# Patient Record
Sex: MALE | Race: WHITE | NOT HISPANIC OR LATINO | Employment: FULL TIME | URBAN - METROPOLITAN AREA
[De-identification: names, ages, dates, MRNs, and addresses within clinical notes are randomized per-mention and may not be internally consistent; named-entity substitution may affect disease eponyms.]

---

## 2017-12-14 ENCOUNTER — TRANSCRIBE ORDERS (OUTPATIENT)
Dept: OCCUPATIONAL THERAPY | Facility: CLINIC | Age: 32
End: 2017-12-14

## 2017-12-14 DIAGNOSIS — S16.1XXA STRAIN OF NECK MUSCLE, INITIAL ENCOUNTER: Primary | ICD-10-CM

## 2017-12-14 DIAGNOSIS — S29.019A STRAIN OF THORACIC REGION, INITIAL ENCOUNTER: ICD-10-CM

## 2017-12-18 ENCOUNTER — TREATMENT (OUTPATIENT)
Dept: PHYSICAL THERAPY | Facility: CLINIC | Age: 32
End: 2017-12-18

## 2017-12-18 PROCEDURE — 97012 MECHANICAL TRACTION THERAPY: CPT | Performed by: PHYSICAL THERAPIST

## 2017-12-18 PROCEDURE — 97140 MANUAL THERAPY 1/> REGIONS: CPT | Performed by: PHYSICAL THERAPIST

## 2017-12-18 PROCEDURE — 97001 PR PHYS THERAPY EVALUATION: CPT | Performed by: PHYSICAL THERAPIST

## 2017-12-18 NOTE — PROGRESS NOTES
Physical Therapy Initial Evaluation and Plan of Care    Patient: Abdi Tyler   : 1985  Diagnosis/ICD-10 Code:  No primary diagnosis found.  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 -  x 5 years Pain  Current pain rating: 3  At best pain rating: 3  At worst pain ratin  Location: Left cervical, scapular region, dorsal forearm, dorsal hand 4th, 5th digits  Quality: burning, dull ache, radiating and sharp  Relieving factors: heat and change in position (arm overhead)  Aggravating factors: prolonged positioning and sleeping (cervical flexion and extension)  Progression: no change    Social Support  Lives with: spouse    Hand dominance: right    Diagnostic Tests  No diagnostic tests performed    Treatments  Previous treatment: medication  Current treatment: medication and physical therapy  Patient Goals  Patient goal: pain to go away           Objective     Special Questions  Patient is experiencing disturbed sleep.       Postural Observations  Seated posture: fair        Palpation   Left   Hypertonic in the upper trapezius.   Tenderness of the cervical paraspinals and upper trapezius.     Neurological Testing   Sensation   Cervical/Thoracic   Left   Diminished: light touch    Comments   Left light touch: C8 distribution.     Active Range of Motion   Cervical/Thoracic Spine   Cervical    Flexion: 49 degrees with pain  Extension: 46 degrees with pain  Left lateral flexion: 40  degrees with pain  Right lateral flexion: 46 degrees   Left rotation: 78 degrees   Right rotation: 79 degrees     Strength/Myotome Testing   Cervical Spine     Right   Normal strength    Left Elbow   Extension: 4-    Left Wrist/Hand   Wrist flexion: 4-    Additional Strength Details  Thumb abduction 4/5 on the left    Tests   Cervical     Left   Positive cervical distraction and Spurling's sign.     Left Shoulder   Positive active compression ([place]) and ULTT1.          Assessment & Plan     Assessment  Impairments: abnormal muscle firing, abnormal muscle tone, abnormal or restricted ROM, activity intolerance, impaired physical strength, lacks appropriate home exercise program and pain with function  Assessment details: 32 y.o. Male with Cervical strain with left UE radiculopathy presents with: 1. constant left cervical pain, 2. Decreased cervical AROM, 3. Altered sensation along the C8 dermatome, 4. Weakness of the C7 & C8 myotomal muscles, 5. 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  Patient will have functional pain free cervical AROM  Patient will be able to lift 20# to chest level without increased symptoms  Patient will return to work with min/no restrictions        Plan  Therapy options: will be seen for skilled physical therapy services  Planned modality interventions: ultrasound and traction  Planned therapy interventions: manual therapy,  soft tissue mobilization, strengthening, stretching, home exercise program and postural training  Frequency: 3x week  Duration in visits: 12  Duration in weeks: 4  Treatment plan discussed with: patient        Manual Therapy:    10     mins  99462;  Therapeutic Exercise:    5     mins  14831;     Neuromuscular Kilo:    0    mins  36083;    Therapeutic Activity:     0     mins  43356;       Evaluation Time:     25  mins  Timed Treatment:   15   mins   Total Treatment:     65   mins    PT SIGNATURE: Clarissa Jennings, PT   DATE TREATMENT INITIATED: 12/18/2017    Initial Certification  Certification Period: 3/18/2018  I certify that the therapy services are furnished while this patient is under my care.  The services outlined above are required by this patient, and will be reviewed every 90 days.     PHYSICIAN: Abdi Butler MD      DATE:     Please sign and return via fax to [phone].. Thank you, [facility].

## 2017-12-19 ENCOUNTER — TREATMENT (OUTPATIENT)
Dept: PHYSICAL THERAPY | Facility: CLINIC | Age: 32
End: 2017-12-19

## 2017-12-19 DIAGNOSIS — S16.1XXD STRAIN OF NECK MUSCLE, SUBSEQUENT ENCOUNTER: Primary | ICD-10-CM

## 2017-12-19 PROCEDURE — 97530 THERAPEUTIC ACTIVITIES: CPT | Performed by: PHYSICAL THERAPIST

## 2017-12-19 PROCEDURE — 97110 THERAPEUTIC EXERCISES: CPT | Performed by: PHYSICAL THERAPIST

## 2017-12-19 PROCEDURE — 97012 MECHANICAL TRACTION THERAPY: CPT | Performed by: PHYSICAL THERAPIST

## 2017-12-19 NOTE — PROGRESS NOTES
"MD Letter    Date of Initial Visit: Type: THERAPY  Noted: 12/18/2017  Today's Date: 12/19/2017  Patient seen for 2 sessions    Treatment has included: therapeutic exercise, manual therapy, ultrasound, traction and moist heat    Subjective   States that he was feeling much better after traction yesterday.  States that his pain was greatly decreased yesterday and had no pain in the arm.  States that when he woke up this morning his neck popped and the pain returned.  Today states that the pain has returned to the left scapular area and the left elbow.  He states that the pain in the hand has essentially resolved. He states that he did not take any medication today to see how he \"truly was\".    Objective   Cervical AROM - full except for extension and left rotation which are both slightly limited by pain.  He does have pain at end range flexion as well but the motion is full.  Strength - significant weakness noted in C7 & C8 myotomes  Sensation - altered sensation along the C7 & C8 dermatomes  Palpation - Increased tone in the left cervical paravertebral muscles  Special tests - positive Cervical compression and distraction tests, positive Spurlings test  Activity tolerances - he is able to lift 10# to waist without pain but 15# caused increased symptoms.    Assessment/Plan  Patient has demonstrated moderate improvement since the initiation of therapy.  The pain has  Decreased in intensity but is still constant.  The cervical motion has increased.  The activity tolerances have increased but only minimally.  I feel that the patient would benefit from continued therapy as the traction was beneficial, but may need further diagnostic testing to rule out abnormal neural involvement.  If you have any questions concerning the care, please do not hesitate to contact me.          PT Signature: Clarissa Jennings, PT          Therapeutic Exercise:    10     mins  76660;     Neuromuscular Kilo:    0    mins  07366;    Therapeutic " Activity:     12     mins  83369;   Assessed for MD    Timed Treatment:   22   mins   Total Treatment:     50   mins

## 2017-12-21 ENCOUNTER — TREATMENT (OUTPATIENT)
Dept: PHYSICAL THERAPY | Facility: CLINIC | Age: 32
End: 2017-12-21

## 2017-12-21 DIAGNOSIS — S16.1XXD STRAIN OF NECK MUSCLE, SUBSEQUENT ENCOUNTER: Primary | ICD-10-CM

## 2017-12-21 PROCEDURE — 97035 APP MDLTY 1+ULTRASOUND EA 15: CPT | Performed by: PHYSICAL THERAPIST

## 2017-12-21 PROCEDURE — 97140 MANUAL THERAPY 1/> REGIONS: CPT | Performed by: PHYSICAL THERAPIST

## 2017-12-21 PROCEDURE — 97012 MECHANICAL TRACTION THERAPY: CPT | Performed by: PHYSICAL THERAPIST

## 2017-12-21 NOTE — PROGRESS NOTES
Physical Therapy Daily Progress Note    VISIT#: 3    Subjective   Abdi Tyler reports: that his pain was varying in intensity last night.  Does feel a little better today in general.  Still has intermittent pain in the left elbow and constant pain in the left scapular region.        Objective   Full cervical motion but has increased pain with end range cervical flexion and extension     Increased tone in cervical pvm and left UT and scalenes    See Exercise, Manual, and Modality Logs for complete treatment.     Patient Education: importance of posture    Assessment/Plan  Persistent pain and parasthesias in left UE and scapular region.  Relief with traction.     Progress per Plan of Care  Await scheduling of MRI         Manual Therapy:    10     mins  63052;  Therapeutic Exercise:    0     mins  40869;     Neuromuscular Kilo:    0    mins  56661;    Therapeutic Activity:     0     mins  34082;       Timed Treatment:   18   mins   Total Treatment:     65   mins    Clarissa Jennings, PT  [place] License # [id number]  Physical Therapist

## 2017-12-27 ENCOUNTER — TREATMENT (OUTPATIENT)
Dept: PHYSICAL THERAPY | Facility: CLINIC | Age: 32
End: 2017-12-27

## 2017-12-27 DIAGNOSIS — S16.1XXD STRAIN OF NECK MUSCLE, SUBSEQUENT ENCOUNTER: Primary | ICD-10-CM

## 2017-12-27 PROCEDURE — 97035 APP MDLTY 1+ULTRASOUND EA 15: CPT | Performed by: PHYSICAL THERAPIST

## 2017-12-27 PROCEDURE — 97012 MECHANICAL TRACTION THERAPY: CPT | Performed by: PHYSICAL THERAPIST

## 2017-12-27 NOTE — PROGRESS NOTES
Physical Therapy Daily Progress Note    VISIT#: 4    Subjective   Abdi Tyler reports: that his cervical and Left UE pain has decreased but has not resolved.  States that the frequency of the UE parasthesias has decreased considerably.  States that he still has radiating pain into the triceps region.      Objective   Relief with cervical distraction     See Exercise, Manual, and Modality Logs for complete treatment.     Patient Education: Posture    Assessment/Plan  Patient with resolving rafdicular symptoms yet still present.  Increased symptoms with cervical extension.    Progress per Plan of Care  Await MRI         Manual Therapy:    5     mins  15769;  Therapeutic Exercise:    0     mins  79671;     Neuromuscular Kilo:    0    mins  69500;    Therapeutic Activity:     0     mins  91707;       Timed Treatment:   13   mins   Total Treatment:     50   mins    Clarissa Jennings, PT  [place] License # [id number]  Physical Therapist

## 2017-12-28 ENCOUNTER — TREATMENT (OUTPATIENT)
Dept: PHYSICAL THERAPY | Facility: CLINIC | Age: 32
End: 2017-12-28

## 2017-12-28 DIAGNOSIS — S16.1XXD STRAIN OF NECK MUSCLE, SUBSEQUENT ENCOUNTER: Primary | ICD-10-CM

## 2017-12-28 PROCEDURE — 97035 APP MDLTY 1+ULTRASOUND EA 15: CPT | Performed by: PHYSICAL THERAPIST

## 2017-12-28 PROCEDURE — 97012 MECHANICAL TRACTION THERAPY: CPT | Performed by: PHYSICAL THERAPIST

## 2017-12-28 PROCEDURE — 97140 MANUAL THERAPY 1/> REGIONS: CPT | Performed by: PHYSICAL THERAPIST

## 2017-12-28 NOTE — PROGRESS NOTES
Physical Therapy Daily Progress Note    VISIT#: 5    Subjective   Abdi Tyler reports: that his neck continues to improve.  States that he still has some quivering in hie left triceps.  States that the arm pain has decreased considerably but does still have the scapular pain.  States that he is taking his meds as prescribed       Objective   Weakness in left triceps and slight weakness in left wrist flexors    Full cervical AROM - pain only with end range flexion    Positive neural tension signs     See Exercise, Manual, and Modality Logs for complete treatment.     Patient Education: posture - avoid cervical extension positioning.     Assessment/Plan  Cervical radicular symptoms are resolving but still has considerable weakness in the left triceps.  Still awaiting authorization for MRI.    Progress per Plan of Care           Manual Therapy:    8     mins  80393;  Therapeutic Exercise:    5     mins  79315;     Neuromuscular Kilo:    0    mins  29586;    Therapeutic Activity:     0     mins  45084;       Timed Treatment:   21   mins   Total Treatment:     60   mins    Clarissa Jennings, PT  [place] License # [id number]  Physical Therapist

## 2017-12-29 ENCOUNTER — TRANSCRIBE ORDERS (OUTPATIENT)
Dept: ADMINISTRATIVE | Facility: HOSPITAL | Age: 32
End: 2017-12-29

## 2017-12-29 DIAGNOSIS — S16.1XXA STRAIN OF CERVICAL PORTION OF LEFT TRAPEZIUS MUSCLE: Primary | ICD-10-CM

## 2018-01-03 ENCOUNTER — TREATMENT (OUTPATIENT)
Dept: PHYSICAL THERAPY | Facility: CLINIC | Age: 33
End: 2018-01-03

## 2018-01-03 DIAGNOSIS — S16.1XXD STRAIN OF NECK MUSCLE, SUBSEQUENT ENCOUNTER: Primary | ICD-10-CM

## 2018-01-03 PROCEDURE — 97035 APP MDLTY 1+ULTRASOUND EA 15: CPT | Performed by: PHYSICAL THERAPIST

## 2018-01-03 PROCEDURE — 97530 THERAPEUTIC ACTIVITIES: CPT | Performed by: PHYSICAL THERAPIST

## 2018-01-03 PROCEDURE — 97012 MECHANICAL TRACTION THERAPY: CPT | Performed by: PHYSICAL THERAPIST

## 2018-01-03 NOTE — PROGRESS NOTES
Physical Therapy Daily Progress Note    VISIT#: 6    Subjective   Abdi Tyler reports: that he has constant low grade pain in the lateral left elbow.  Has intermittent burning in the left scapular region. Denies any cervical pain or pain into the hand.  States that his arm still feels a little weak.      Objective   Weakness in left triceps and wrist flexors     Sensation intact    Cervical flexion causes increased left elbow pain, cervical extension causes increased cervical pain     See Exercise, Manual, and Modality Logs for complete treatment.     Patient Education: avoid heavy lifting even if he feels like he can do it.  Rest positions, symptom relief positions    Assessment/Plan  Continues to receive relief from traction.    Progress per Plan of Care  To have MRI on Friday         Manual Therapy:    0     mins  30996;  Therapeutic Exercise:    0     mins  09112;     Neuromuscular Kilo:    0    mins  39667;    Therapeutic Activity:     10     mins  75479;       Timed Treatment:   18   mins   Total Treatment:     50   mins    Clarissa Jennings, PT  [place] License # [id number]  Physical Therapist

## 2018-01-04 ENCOUNTER — TREATMENT (OUTPATIENT)
Dept: PHYSICAL THERAPY | Facility: CLINIC | Age: 33
End: 2018-01-04

## 2018-01-04 DIAGNOSIS — S16.1XXD STRAIN OF NECK MUSCLE, SUBSEQUENT ENCOUNTER: Primary | ICD-10-CM

## 2018-01-04 PROCEDURE — 97035 APP MDLTY 1+ULTRASOUND EA 15: CPT | Performed by: PHYSICAL THERAPIST

## 2018-01-04 PROCEDURE — 97012 MECHANICAL TRACTION THERAPY: CPT | Performed by: PHYSICAL THERAPIST

## 2018-01-05 ENCOUNTER — HOSPITAL ENCOUNTER (OUTPATIENT)
Dept: MRI IMAGING | Facility: HOSPITAL | Age: 33
Discharge: HOME | End: 2018-01-05
Admitting: PHYSICAL MEDICINE & REHABILITATION

## 2018-01-05 DIAGNOSIS — S16.1XXA STRAIN OF CERVICAL PORTION OF LEFT TRAPEZIUS MUSCLE: ICD-10-CM

## 2018-01-05 PROCEDURE — 72141 MRI NECK SPINE W/O DYE: CPT

## 2018-01-05 NOTE — PROGRESS NOTES
Physical Therapy Daily Progress Note    VISIT#: 7    Subjective   Abdi Tyler reports: that the radicular symptoms have decreased considerably with the traction.  States that he is having more lower cervical soreness now.  States MRI is tomorrow.      Objective   Full cervical AROM with pain noted only at end range flexion and extension     Persistent weakness in left triceps and wrist flexors    See Exercise, Manual, and Modality Logs for complete treatment.     Patient Education: Discussed concept of centralization of symptoms    Assessment/Plan  Patient is independent in performing his HEP.  Radicular symptoms are resolving with the traction but he still shows sings of neural irritation as his left triceps and wrist flexors are still considerably weaker than the right.  Continued with conservative treatment secondary to persistent neural involvement.    To have MRI tomorrow.  Await results           Manual Therapy:    0     mins  84361;  Therapeutic Exercise:    0     mins  84954;     Neuromuscular Kilo:    0    mins  27521;    Therapeutic Activity:     5     mins  43265;   Patient education    Timed Treatment:   13   mins   Total Treatment:     50   mins    Clarissa Jennings, PT  [place] License # [id number]  Physical Therapist

## 2018-02-05 ENCOUNTER — TRANSCRIBE ORDERS (OUTPATIENT)
Dept: PHYSICAL THERAPY | Facility: CLINIC | Age: 33
End: 2018-02-05

## 2018-02-05 DIAGNOSIS — M54.2 CERVICAL PAIN: Primary | ICD-10-CM

## 2018-02-07 ENCOUNTER — TREATMENT (OUTPATIENT)
Dept: PHYSICAL THERAPY | Facility: CLINIC | Age: 33
End: 2018-02-07

## 2018-02-07 DIAGNOSIS — S16.1XXD STRAIN OF NECK MUSCLE, SUBSEQUENT ENCOUNTER: Primary | ICD-10-CM

## 2018-02-07 PROCEDURE — 97110 THERAPEUTIC EXERCISES: CPT | Performed by: PHYSICAL THERAPIST

## 2018-02-07 PROCEDURE — 97012 MECHANICAL TRACTION THERAPY: CPT | Performed by: PHYSICAL THERAPIST

## 2018-02-07 PROCEDURE — 97002 PR PHYS THERAPY RE-EVALUATION: CPT | Performed by: PHYSICAL THERAPIST

## 2018-02-07 NOTE — PROGRESS NOTES
Physical Therapy Initial Evaluation and Plan of Care    Patient: Abdi Tyler   : 1985  Diagnosis/ICD-10 Code:  Strain of neck muscle, subsequent encounter [S16.1XXD]  Referring practitioner: Mason Nelson MD    Subjective Evaluation    History of Present Illness  Date of onset: 2017  Mechanism of injury: While working reaching out to the left with the left arm and cervical region rotated to the right, felt pop in the left cervical area, pain developed next day,   BW - meds, restrictions and PT  Previous traction, manual therapy, exercises  MRI - C56 & C67 disc involvement  DR Nelson - does not want to do surgery as patinet is improving    Ibu and Flexeril - prn as patient taking when needed      Patient Occupation:  - manufacturing parts   Precautions and Work Restrictions: No lifting >15#, no overhead workPain  Current pain ratin  At best pain ratin  At worst pain rating: 3  Location: Left cervical, upper thoracic and axillary region , occasional posterior elbow  Quality: knife-like and discomfort  Relieving factors: rest, change in position and medications  Exacerbated by: flexion, quick rotation of the neck.  Progression: improved    Diagnostic Tests  MRI studies: abnormal (C56 & C67 disc involvement)    Treatments  Previous treatment: medication and physical therapy  Current treatment: physical therapy  Patient Goals  Patient goal: GEt rid of the pain with motion, back to normal activity iwhtout pain           Objective       Postural Observations  Seated posture: fair  Standing posture: good    Additional Postural Observation Details  Slight protraction of the shoulders    Active Range of Motion   Cervical/Thoracic Spine   Cervical    Flexion: 49 (pain at 37) degrees with pain  Extension: 46 degrees   Left lateral flexion: 49 degrees   Right lateral flexion: 44 degrees   Left rotation: 78 degrees   Right rotation: 81 degrees     Strength/Myotome Testing   Cervical Spine      Left   Levator scapulae (C4): 5    Left Shoulder     Isolated Muscles   Levator scapulae: 5   Lower trapezius: 4+   Middle trapezius: 4+     Left Elbow   Flexion: 4+  Extension: 4    Left Wrist/Hand   Wrist extension: 5  Wrist flexion: 4    Tests   Cervical     Left   Positive cervical distraction and Spurling's sign.     Left Shoulder   Positive active compression ([place]).          Assessment & Plan     Assessment  Impairments: abnormal muscle tone, abnormal or restricted ROM, activity intolerance, impaired physical strength, lacks appropriate home exercise program, pain with function and safety issue  Assessment details: 33 y.o. Female with cervical strain with resolving radicular symptoms presents with: 1. Constant low grade cervical and left upper thoracic pain,  2. Slightly decreased cervical AROM, 3. Increased muscle tone in left cervical and upper thoracic mm.,  4. Weakness in left triceps and wrist flexors, 5. 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  Patient will have functional pain free cervical AROM  Patient will be able to Lift 40# without increased symptoms  Patient will return to work with min/no restrictions        Plan  Therapy options: will be seen for skilled physical therapy services  Planned modality interventions: traction  Planned therapy interventions: manual therapy, strengthening, stretching and spinal/joint mobilization  Frequency: 3x week  Duration in  visits: 12  Duration in weeks: 4  Treatment plan discussed with: patient        Manual Therapy:    0     mins  06165;  Therapeutic Exercise:    35     mins  52677;     Neuromuscular Kilo:    0    mins  11891;    Therapeutic Activity:     0     mins  23948;       Evaluation Time:     30  mins  Timed Treatment:   35   mins   Total Treatment:     95   mins    PT SIGNATURE: Clarissa Jennings, PT   DATE TREATMENT INITIATED: 2/7/2018    Initial Certification  Certification Period: 5/8/2018  I certify that the therapy services are furnished while this patient is under my care.  The services outlined above are required by this patient, and will be reviewed every 90 days.     PHYSICIAN: Mason Nelson MD  __________________________      DATE: ______________________    Please sign and return via fax to [phone].. Thank you, [facility].

## 2018-02-08 ENCOUNTER — TREATMENT (OUTPATIENT)
Dept: PHYSICAL THERAPY | Facility: CLINIC | Age: 33
End: 2018-02-08

## 2018-02-08 DIAGNOSIS — M54.12 RADICULOPATHY, CERVICAL: ICD-10-CM

## 2018-02-08 DIAGNOSIS — S16.1XXD STRAIN OF NECK MUSCLE, SUBSEQUENT ENCOUNTER: Primary | ICD-10-CM

## 2018-02-08 PROCEDURE — 97110 THERAPEUTIC EXERCISES: CPT | Performed by: PHYSICAL THERAPIST

## 2018-02-08 NOTE — PROGRESS NOTES
Physical Therapy Daily Progress Note    VISIT#: 2    Subjective   Abdi Tyler reports: that he had some muscles soreness from yesterday but not ture pain.   Soreness in triceps.      Objective   Tenderness in triceps - muscle soreness    Pain into the left scapular region with end range cervical flexion    See Exercise, Manual, and Modality Logs for complete treatment.     Patient Education: proper posture    Assessment/Plan  Patient with muscle soreness from exercise but no pain.  Did not have time for traction today due to work conflicts.  No return of radicular symptoms.      Progress strengthening /stabilization /functional activity  Resume traction next visit         Manual Therapy:    0     mins  75315;  Therapeutic Exercise:    40     mins  03082;     Neuromuscular Kilo:    0    mins  34975;    Therapeutic Activity:     0     mins  54667;       Timed Treatment:   40   mins   Total Treatment:     50   mins    Clarissa Jennings, PT  [place] License # [id number]  Physical Therapist

## 2018-02-13 ENCOUNTER — TREATMENT (OUTPATIENT)
Dept: PHYSICAL THERAPY | Facility: CLINIC | Age: 33
End: 2018-02-13

## 2018-02-13 DIAGNOSIS — M54.12 RADICULOPATHY, CERVICAL: ICD-10-CM

## 2018-02-13 DIAGNOSIS — S16.1XXD STRAIN OF NECK MUSCLE, SUBSEQUENT ENCOUNTER: Primary | ICD-10-CM

## 2018-02-13 PROCEDURE — 97140 MANUAL THERAPY 1/> REGIONS: CPT | Performed by: PHYSICAL THERAPIST

## 2018-02-13 PROCEDURE — 97110 THERAPEUTIC EXERCISES: CPT | Performed by: PHYSICAL THERAPIST

## 2018-02-13 NOTE — PROGRESS NOTES
Physical Therapy Daily Progress Note    VISIT#: 3    Subjective   Abdi Tyler reports: that he still has a little weak in the left arm, but it is is improving.  States that he had occasional radiating pain into the left scapular region as well as posterior upper arm.      Objective   Strength - left triceps 4/5, left wrist flexors 4+/5, otherwise WNL in left UE    Increased muscle tone in the left upper traps and cervical paravertebral muscles      Full cervical flexion but has pain at ~75% flexion, full extension    See Exercise, Manual, and Modality Logs for complete treatment.     Patient Education: avoid painful positions     Assessment/Plan  Patient with increased strength in UE and decreased frequency of pain.  Still with some signs of abnormal neural involvement.     Progress strengthening /stabilization /functional activity           Manual Therapy:    8     mins  87632;  Therapeutic Exercise:    40     mins  61859;     Neuromuscular Kilo:    0    mins  76065;    Therapeutic Activity:     0     mins  17805;       Timed Treatment:   48   mins   Total Treatment:     75   mins    Clarissa Jennings, PT  [place] License # [id number]  Physical Therapist

## 2018-02-15 ENCOUNTER — TREATMENT (OUTPATIENT)
Dept: PHYSICAL THERAPY | Facility: CLINIC | Age: 33
End: 2018-02-15

## 2018-02-15 DIAGNOSIS — M54.12 RADICULOPATHY, CERVICAL: ICD-10-CM

## 2018-02-15 DIAGNOSIS — S16.1XXD STRAIN OF NECK MUSCLE, SUBSEQUENT ENCOUNTER: Primary | ICD-10-CM

## 2018-02-15 PROCEDURE — 97110 THERAPEUTIC EXERCISES: CPT | Performed by: PHYSICAL THERAPIST

## 2018-02-15 PROCEDURE — 97140 MANUAL THERAPY 1/> REGIONS: CPT | Performed by: PHYSICAL THERAPIST

## 2018-02-15 PROCEDURE — 97012 MECHANICAL TRACTION THERAPY: CPT | Performed by: PHYSICAL THERAPIST

## 2018-02-15 NOTE — PROGRESS NOTES
Physical Therapy Daily Progress Note    VISIT#: 4    Subjective   Abdi Tyler reports: that he is having a slight flareup of pain today.  Noted increased pain upon awakening.  Unsure of cause of new symptoms.  States he actually is having some new cervical pain.       Objective   Cervical flexion full but has left cervical and scapular pain with end range flexion    Relief with cervical distraction    See Exercise, Manual, and Modality Logs for complete treatment.     Patient Education:Posture    Assessment/Plan  Relief of cervical symptoms with manual and mechanical cervical traction today.  Patient very compliant with treatment program.    Progress per Plan of Care           Manual Therapy:    11     mins  09759;  Therapeutic Exercise:    30     mins  66692;     Neuromuscular Kilo:    0    mins  85968;    Therapeutic Activity:     0     mins  35890;       Timed Treatment:   41   mins   Total Treatment:     75   mins    Clarissa Jennings, PT  [place] License # [id number]  Physical Therapist

## 2018-02-22 ENCOUNTER — TREATMENT (OUTPATIENT)
Dept: PHYSICAL THERAPY | Facility: CLINIC | Age: 33
End: 2018-02-22

## 2018-02-22 DIAGNOSIS — S16.1XXD STRAIN OF NECK MUSCLE, SUBSEQUENT ENCOUNTER: Primary | ICD-10-CM

## 2018-02-22 DIAGNOSIS — M54.12 RADICULOPATHY, CERVICAL: ICD-10-CM

## 2018-02-22 PROCEDURE — 97110 THERAPEUTIC EXERCISES: CPT | Performed by: PHYSICAL THERAPIST

## 2018-02-22 PROCEDURE — 97012 MECHANICAL TRACTION THERAPY: CPT | Performed by: PHYSICAL THERAPIST

## 2018-02-22 NOTE — PROGRESS NOTES
Physical Therapy Daily Progress Note    VISIT#: 5    Subjective   Abdi Tyler reports: that he seems to be getting moire cervical pain now but has much less muscular pain. States that the has not had any arm pain recently.  States that he no longer has any pain interrupted sleep.  Has only taken IBU twice this week.        Objective   Cervical flexion  62* with pain at 49*    Slight restriction in left rotation     Full UE strength     See Exercise, Manual, and Modality Logs for complete treatment.     Patient Education:concept of centralization of pain    Assessment/Plan  Much improved as he no longer has any radicular symptoms and his strength has returned to normal.     Progress per Plan of Care           Manual Therapy:    0     mins  86072;  Therapeutic Exercise:    45/45     mins  45919;     Neuromuscular Kilo:    0    mins  21382;    Therapeutic Activity:     0     mins  72670;       Timed Treatment:   45   mins   Total Treatment:     70   mins    Clarissa Jennings, PT  [place] License # [id number]  Physical Therapist

## 2018-02-26 ENCOUNTER — TREATMENT (OUTPATIENT)
Dept: PHYSICAL THERAPY | Facility: CLINIC | Age: 33
End: 2018-02-26

## 2018-02-26 DIAGNOSIS — M54.12 RADICULOPATHY, CERVICAL: Primary | ICD-10-CM

## 2018-02-26 PROCEDURE — 97530 THERAPEUTIC ACTIVITIES: CPT | Performed by: PHYSICAL THERAPIST

## 2018-02-26 PROCEDURE — 97110 THERAPEUTIC EXERCISES: CPT | Performed by: PHYSICAL THERAPIST

## 2018-02-26 NOTE — PROGRESS NOTES
MD Letter    Date of Initial Visit: Type: THERAPY  Noted: 2/7/2018  Today's Date: 2/26/2018  Patient seen for 6 sessions    Treatment has included: therapeutic exercise, manual therapy and traction    Subjective   He states that he is feeling much better than he did upon his last visit  With you.  He states that he only has left  Scapular pain 2-3 times/week.  He states that he is not having any cervical or true left UE pain or parasthesias.  He states that he has been much more active the past two weeks without increased symptoms.    Objective   Cervical AROM -  Full and pain free in all planes of motion except for flexion, which is slightly painful at end range flexion  Strength - 5/5 in left UE   strength - right 108#, left 98#  Sensation - intact  Palpation - slight increased muscle tone in the left upper trap  Special tests - positive cervical distraction, slightly posiitve Spurlings test  Activity tolerances - he is able to lift 50# with both hands to shoulder level and 50# to waist level with the left hand only.    Assessment/Plan  Patient has demonstrated signficant improvement since the initiation of therapy.  The pain has  Nearly resolved.  The motion has returned to normal.  The activity tolerances have increased to work demand level.  I feel that the patient would benefit from continuing his HEP but discontinuing his formal therapy.  If you have any questions concerning the care, please do not hesitate to contact me.          PT Signature: Clarissa Jennings, PT        Manual Therapy:    0     mins  75437;  Therapeutic Exercise:    35     mins  68220;     Neuromuscular Kilo:    0    mins  55990;    Therapeutic Activity:     10     mins  35539;   Assessed for MD    Timed Treatment:   45   mins   Total Treatment:     60   mins

## 2021-04-16 ENCOUNTER — BULK ORDERING (OUTPATIENT)
Dept: CASE MANAGEMENT | Facility: OTHER | Age: 36
End: 2021-04-16

## 2021-04-16 DIAGNOSIS — Z23 IMMUNIZATION DUE: ICD-10-CM

## 2021-08-18 ENCOUNTER — APPOINTMENT (OUTPATIENT)
Dept: MRI IMAGING | Facility: HOSPITAL | Age: 36
End: 2021-08-18

## 2021-08-18 ENCOUNTER — HOSPITAL ENCOUNTER (INPATIENT)
Facility: HOSPITAL | Age: 36
LOS: 5 days | Discharge: HOME | End: 2021-08-23
Attending: EMERGENCY MEDICINE | Admitting: NEUROLOGICAL SURGERY

## 2021-08-18 ENCOUNTER — APPOINTMENT (OUTPATIENT)
Dept: CT IMAGING | Facility: HOSPITAL | Age: 36
End: 2021-08-18

## 2021-08-18 ENCOUNTER — APPOINTMENT (OUTPATIENT)
Dept: GENERAL RADIOLOGY | Facility: HOSPITAL | Age: 36
End: 2021-08-18

## 2021-08-18 DIAGNOSIS — K20.90 ESOPHAGITIS: ICD-10-CM

## 2021-08-18 DIAGNOSIS — M50.222 HERNIATION OF INTERVERTEBRAL DISC AT C5-C6 LEVEL: Primary | ICD-10-CM

## 2021-08-18 DIAGNOSIS — R20.0 NUMBNESS AND TINGLING IN LEFT ARM: ICD-10-CM

## 2021-08-18 DIAGNOSIS — R20.2 NUMBNESS AND TINGLING IN LEFT ARM: ICD-10-CM

## 2021-08-18 LAB
ANION GAP SERPL CALCULATED.3IONS-SCNC: 11.1 MMOL/L (ref 5–15)
BUN SERPL-MCNC: 12 MG/DL (ref 6–20)
BUN/CREAT SERPL: 14 (ref 7–25)
CALCIUM SPEC-SCNC: 9.2 MG/DL (ref 8.6–10.5)
CHLORIDE SERPL-SCNC: 105 MMOL/L (ref 98–107)
CO2 SERPL-SCNC: 23.9 MMOL/L (ref 22–29)
CREAT SERPL-MCNC: 0.86 MG/DL (ref 0.76–1.27)
DEPRECATED RDW RBC AUTO: 41.9 FL (ref 37–54)
ERYTHROCYTE [DISTWIDTH] IN BLOOD BY AUTOMATED COUNT: 12.2 % (ref 12.3–15.4)
GFR SERPL CREATININE-BSD FRML MDRD: 100 ML/MIN/1.73
GLUCOSE SERPL-MCNC: 169 MG/DL (ref 65–99)
HCT VFR BLD AUTO: 47.2 % (ref 37.5–51)
HGB BLD-MCNC: 15.5 G/DL (ref 13–17.7)
INR PPP: 0.9 (ref 0.9–1.1)
LYMPHOCYTES # BLD MANUAL: 0.23 10*3/MM3 (ref 0.7–3.1)
LYMPHOCYTES NFR BLD MANUAL: 2 % (ref 19.6–45.3)
MCH RBC QN AUTO: 30.3 PG (ref 26.6–33)
MCHC RBC AUTO-ENTMCNC: 32.8 G/DL (ref 31.5–35.7)
MCV RBC AUTO: 92.4 FL (ref 79–97)
NEUTROPHILS # BLD AUTO: 11.26 10*3/MM3 (ref 1.7–7)
NEUTROPHILS NFR BLD MANUAL: 98 % (ref 42.7–76)
PLAT MORPH BLD: NORMAL
PLATELET # BLD AUTO: 282 10*3/MM3 (ref 140–450)
PMV BLD AUTO: 9.7 FL (ref 6–12)
POTASSIUM SERPL-SCNC: 4.3 MMOL/L (ref 3.5–5.2)
PROTHROMBIN TIME: 12 SECONDS (ref 11.7–14.2)
RBC # BLD AUTO: 5.11 10*6/MM3 (ref 4.14–5.8)
RBC MORPH BLD: NORMAL
SARS-COV-2 RNA RESP QL NAA+PROBE: NOT DETECTED
SODIUM SERPL-SCNC: 140 MMOL/L (ref 136–145)
WBC # BLD AUTO: 11.49 10*3/MM3 (ref 3.4–10.8)
WBC MORPH BLD: NORMAL

## 2021-08-18 PROCEDURE — 85007 BL SMEAR W/DIFF WBC COUNT: CPT | Performed by: EMERGENCY MEDICINE

## 2021-08-18 PROCEDURE — U0003 INFECTIOUS AGENT DETECTION BY NUCLEIC ACID (DNA OR RNA); SEVERE ACUTE RESPIRATORY SYNDROME CORONAVIRUS 2 (SARS-COV-2) (CORONAVIRUS DISEASE [COVID-19]), AMPLIFIED PROBE TECHNIQUE, MAKING USE OF HIGH THROUGHPUT TECHNOLOGIES AS DESCRIBED BY CMS-2020-01-R: HCPCS | Performed by: EMERGENCY MEDICINE

## 2021-08-18 PROCEDURE — 80048 BASIC METABOLIC PNL TOTAL CA: CPT | Performed by: EMERGENCY MEDICINE

## 2021-08-18 PROCEDURE — 85610 PROTHROMBIN TIME: CPT | Performed by: NURSE PRACTITIONER

## 2021-08-18 PROCEDURE — 72141 MRI NECK SPINE W/O DYE: CPT

## 2021-08-18 PROCEDURE — 25010000002 DEXAMETHASONE SODIUM PHOSPHATE 20 MG/5ML SOLUTION: Performed by: EMERGENCY MEDICINE

## 2021-08-18 PROCEDURE — 25010000002 KETOROLAC TROMETHAMINE PER 15 MG: Performed by: EMERGENCY MEDICINE

## 2021-08-18 PROCEDURE — 72125 CT NECK SPINE W/O DYE: CPT

## 2021-08-18 PROCEDURE — G0378 HOSPITAL OBSERVATION PER HR: HCPCS

## 2021-08-18 PROCEDURE — 99221 1ST HOSP IP/OBS SF/LOW 40: CPT | Performed by: NEUROLOGICAL SURGERY

## 2021-08-18 PROCEDURE — 99284 EMERGENCY DEPT VISIT MOD MDM: CPT

## 2021-08-18 PROCEDURE — 72052 X-RAY EXAM NECK SPINE 6/>VWS: CPT

## 2021-08-18 PROCEDURE — 25010000002 DEXAMETHASONE SODIUM PHOSPHATE 20 MG/5ML SOLUTION: Performed by: NURSE PRACTITIONER

## 2021-08-18 PROCEDURE — 85025 COMPLETE CBC W/AUTO DIFF WBC: CPT | Performed by: EMERGENCY MEDICINE

## 2021-08-18 PROCEDURE — 25010000002 METHYLPREDNISOLONE PER 125 MG: Performed by: EMERGENCY MEDICINE

## 2021-08-18 RX ORDER — KETOROLAC TROMETHAMINE 30 MG/ML
30 INJECTION, SOLUTION INTRAMUSCULAR; INTRAVENOUS ONCE
Status: COMPLETED | OUTPATIENT
Start: 2021-08-18 | End: 2021-08-18

## 2021-08-18 RX ORDER — SODIUM CHLORIDE 0.9 % (FLUSH) 0.9 %
10 SYRINGE (ML) INJECTION AS NEEDED
Status: DISCONTINUED | OUTPATIENT
Start: 2021-08-18 | End: 2021-08-23 | Stop reason: HOSPADM

## 2021-08-18 RX ORDER — HYDROCODONE BITARTRATE AND ACETAMINOPHEN 7.5; 325 MG/1; MG/1
1 TABLET ORAL EVERY 4 HOURS PRN
Status: DISCONTINUED | OUTPATIENT
Start: 2021-08-18 | End: 2021-08-20

## 2021-08-18 RX ORDER — KETOROLAC TROMETHAMINE 30 MG/ML
30 INJECTION, SOLUTION INTRAMUSCULAR; INTRAVENOUS ONCE
Status: DISCONTINUED | OUTPATIENT
Start: 2021-08-18 | End: 2021-08-18

## 2021-08-18 RX ORDER — GABAPENTIN 400 MG/1
400 CAPSULE ORAL 3 TIMES DAILY
Status: DISCONTINUED | OUTPATIENT
Start: 2021-08-18 | End: 2021-08-20

## 2021-08-18 RX ORDER — ONDANSETRON 2 MG/ML
4 INJECTION INTRAMUSCULAR; INTRAVENOUS EVERY 6 HOURS PRN
Status: DISCONTINUED | OUTPATIENT
Start: 2021-08-18 | End: 2021-08-23 | Stop reason: HOSPADM

## 2021-08-18 RX ORDER — METHYLPREDNISOLONE SODIUM SUCCINATE 125 MG/2ML
80 INJECTION, POWDER, LYOPHILIZED, FOR SOLUTION INTRAMUSCULAR; INTRAVENOUS ONCE
Status: COMPLETED | OUTPATIENT
Start: 2021-08-18 | End: 2021-08-18

## 2021-08-18 RX ORDER — SODIUM CHLORIDE 0.9 % (FLUSH) 0.9 %
10 SYRINGE (ML) INJECTION EVERY 12 HOURS SCHEDULED
Status: DISCONTINUED | OUTPATIENT
Start: 2021-08-18 | End: 2021-08-23 | Stop reason: HOSPADM

## 2021-08-18 RX ORDER — DEXAMETHASONE SODIUM PHOSPHATE 4 MG/ML
4 INJECTION, SOLUTION INTRA-ARTICULAR; INTRALESIONAL; INTRAMUSCULAR; INTRAVENOUS; SOFT TISSUE EVERY 6 HOURS
Status: DISCONTINUED | OUTPATIENT
Start: 2021-08-18 | End: 2021-08-20

## 2021-08-18 RX ORDER — CALCIUM CARBONATE 200(500)MG
2 TABLET,CHEWABLE ORAL EVERY 6 HOURS PRN
Status: DISCONTINUED | OUTPATIENT
Start: 2021-08-18 | End: 2021-08-23 | Stop reason: HOSPADM

## 2021-08-18 RX ORDER — NITROGLYCERIN 0.4 MG/1
0.4 TABLET SUBLINGUAL
Status: DISCONTINUED | OUTPATIENT
Start: 2021-08-18 | End: 2021-08-23 | Stop reason: HOSPADM

## 2021-08-18 RX ORDER — ONDANSETRON 4 MG/1
4 TABLET, FILM COATED ORAL EVERY 6 HOURS PRN
Status: DISCONTINUED | OUTPATIENT
Start: 2021-08-18 | End: 2021-08-23 | Stop reason: HOSPADM

## 2021-08-18 RX ORDER — FAMOTIDINE 10 MG/ML
20 INJECTION, SOLUTION INTRAVENOUS EVERY 12 HOURS SCHEDULED
Status: DISCONTINUED | OUTPATIENT
Start: 2021-08-18 | End: 2021-08-22

## 2021-08-18 RX ADMIN — FAMOTIDINE 20 MG: 10 INJECTION INTRAVENOUS at 20:00

## 2021-08-18 RX ADMIN — DEXAMETHASONE SODIUM PHOSPHATE 4 MG: 4 INJECTION, SOLUTION INTRAMUSCULAR; INTRAVENOUS at 15:45

## 2021-08-18 RX ADMIN — HYDROCODONE BITARTRATE AND ACETAMINOPHEN 1 TABLET: 7.5; 325 TABLET ORAL at 20:00

## 2021-08-18 RX ADMIN — KETOROLAC TROMETHAMINE 30 MG: 30 INJECTION, SOLUTION INTRAMUSCULAR at 04:30

## 2021-08-18 RX ADMIN — GABAPENTIN 400 MG: 400 CAPSULE ORAL at 15:26

## 2021-08-18 RX ADMIN — DEXAMETHASONE SODIUM PHOSPHATE 4 MG: 4 INJECTION, SOLUTION INTRAMUSCULAR; INTRAVENOUS at 22:06

## 2021-08-18 RX ADMIN — GABAPENTIN 400 MG: 400 CAPSULE ORAL at 10:54

## 2021-08-18 RX ADMIN — METHYLPREDNISOLONE SODIUM SUCCINATE 80 MG: 125 INJECTION, POWDER, FOR SOLUTION INTRAMUSCULAR; INTRAVENOUS at 04:31

## 2021-08-18 RX ADMIN — GABAPENTIN 400 MG: 400 CAPSULE ORAL at 20:00

## 2021-08-18 RX ADMIN — SODIUM CHLORIDE, PRESERVATIVE FREE 10 ML: 5 INJECTION INTRAVENOUS at 20:01

## 2021-08-18 RX ADMIN — DEXAMETHASONE SODIUM PHOSPHATE 4 MG: 4 INJECTION, SOLUTION INTRAMUSCULAR; INTRAVENOUS at 10:56

## 2021-08-18 RX ADMIN — HYDROCODONE BITARTRATE AND ACETAMINOPHEN 1 TABLET: 7.5; 325 TABLET ORAL at 15:32

## 2021-08-18 NOTE — ED PROVIDER NOTES
EMERGENCY DEPARTMENT ENCOUNTER    Room Number:  16/16  Date of encounter:  8/18/2021  PCP: Epley, James, APRN  Historian: Patient      HPI:  Chief Complaint: Neck pain radiating to left thumb  A complete HPI/ROS/PMH/PSH/SH/FH are unobtainable due to: None    Context: Abdi Tyler is a 36 y.o. male who presents to the ED c/o approximately a week of pain in his neck and arm.  Patient has been undergoing chiropractic manipulation for the past week.  States that the pain was getting better but that he suddenly awoke this morning with pain to his lower C-spine and radiating pain that extended down his arm and into his left thumb.  No weakness.  No numbness.  No difficulty breathing.  No chest pain.      PAST MEDICAL HISTORY  Active Ambulatory Problems     Diagnosis Date Noted   • Esophagitis 04/28/2016     Resolved Ambulatory Problems     Diagnosis Date Noted   • No Resolved Ambulatory Problems     No Additional Past Medical History         PAST SURGICAL HISTORY  Past Surgical History:   Procedure Laterality Date   • WISDOM TOOTH EXTRACTION           FAMILY HISTORY  Family History   Problem Relation Age of Onset   • Diabetes Mother    • Hypertension Mother          SOCIAL HISTORY  Social History     Socioeconomic History   • Marital status:      Spouse name: Not on file   • Number of children: Not on file   • Years of education: Not on file   • Highest education level: Not on file   Tobacco Use   • Smoking status: Current Every Day Smoker     Packs/day: 0.25   • Smokeless tobacco: Never Used   Substance and Sexual Activity   • Alcohol use: No   • Drug use: No   • Sexual activity: Yes         ALLERGIES  Patient has no known allergies.        REVIEW OF SYSTEMS  Review of Systems     All systems reviewed and negative except for those discussed in HPI.       PHYSICAL EXAM    I have reviewed the triage vital signs and nursing notes.    ED Triage Vitals   Temp Heart Rate Resp BP SpO2   08/18/21 0357 08/18/21 0357  08/18/21 0357 08/18/21 0436 08/18/21 0357   97.8 °F (36.6 °C) 81 22 146/100 100 %      Temp src Heart Rate Source Patient Position BP Location FiO2 (%)   08/18/21 0357 08/18/21 0357 -- -- --   Tympanic Monitor          Physical Exam  GENERAL: Mild distressed  HENT: nares patent -focal tenderness lower C-spine  EYES: no scleral icterus  CV: regular rhythm, regular rate  RESPIRATORY: normal effort  ABDOMEN: soft, nontender nondistended  MUSCULOSKELETAL: no deformity  NEURO: alert, moves all extremities, follows commands  SKIN: warm, dry        LAB RESULTS  No results found for this or any previous visit (from the past 24 hour(s)).    Ordered the above labs and independently reviewed the results.        RADIOLOGY  CT Cervical Spine Without Contrast    Result Date: 8/18/2021  Patient: YO GOMEZ  Time Out: 06:47 Exam(s): CT C SPINE EXAM:   CT Cervical Spine Without Intravenous Contrast CLINICAL HISTORY:    Reason for exam: neck pain radiating down left arm - recently adjusted by chiropractor. TECHNIQUE:   Axial computed tomography images of the cervical spine without intravenous contrast.  CTDI is 10.3 mGy and DLP is 244.9 mGy-cm.  This CT exam was performed according to the principle of ALARA (As Low As Reasonably Achievable) by using one or more of the following dose reduction techniques: automated exposure control, adjustment of the mA and or kV according to patient size, and or use of iterative reconstruction technique. COMPARISON:   MRI of the cervical spine, 1 5 2018. FINDINGS:   Vertebrae:  No acute fracture or malalignment.  Slight reversal of cervical lordosis.  Mild multilevel spondylosis with disc space narrowing and endplate spurring, most pronounced at C5-6 and C6-7 as on previous MRI.   Discs spinal canal neural foramina:  Enlarging 8 mm left paracentral disc herniation at C5-6 which moderately narrows the canal on the left and compresses the cord.  No significant neural foraminal stenosis.   Soft  tissues:  Cervical soft tissues are unremarkable within limits of noncontrast technique.   Thyroid:  Mildly heterogeneous thyroid gland.   Lung apices:  The visualized lung apices are clear. IMPRESSION:     1.  No acute fracture or malalignment. 2.  Slight reversal of cervical lordosis which may be positional or related to muscle spasm. 3.  Mild multilevel disc degeneration, as before. 4.  Enlarging 8 mm left C5-6 paracentral disc herniation with underlying cord compression.  Recommend further evaluation with cervical spine MRI.     Electronically signed by Germán Ruiz M.D. on 08-18-21 at 0647      I ordered the above noted radiological studies. Reviewed by me and discussed with radiologist.  See dictation for official radiology interpretation.      PROCEDURES    Procedures      MEDICATIONS GIVEN IN ER    Medications   methylPREDNISolone sodium succinate (SOLU-Medrol) injection 80 mg (80 mg Intramuscular Given 8/18/21 0431)   ketorolac (TORADOL) injection 30 mg (30 mg Intramuscular Given 8/18/21 0430)         PROGRESS, DATA ANALYSIS, CONSULTS, AND MEDICAL DECISION MAKING    All labs have been independently reviewed by me.  All radiology studies have been reviewed by me and discussed with radiologist dictating the report.   EKG's independently viewed and interpreted by me.  Discussion below represents my analysis of pertinent findings related to patient's condition, differential diagnosis, treatment plan and final disposition.        ED Course as of Aug 18 0711   Wed Aug 18, 2021   0708 Reevaluation: Patient is feeling better with Solu-Medrol and Toradol.    [TJ]   0709 Care to Dr. Marinelli - pending MRI and likely ESTEFANIA consult.    [TJ]      ED Course User Index  [TJ] Dexter Crain MD           PPE: Both the patient and I wore a surgical mask throughout the entire patient encounter. I wore protective goggles.     AS OF 07:11 EDT VITALS:    BP - 146/100  HR - 81  TEMP - 97.8 °F (36.6 °C) (Tympanic)  O2 SATS -  100%        DIAGNOSIS  Final diagnoses:   Herniation of intervertebral disc at C5-C6 level         DISPOSITION  pending           Dexter Crain MD  08/18/21 0726

## 2021-08-18 NOTE — ED TRIAGE NOTES
Pt presents to ED via POV from home w/cc right shoulder pain and numbness.  Pt states he began having back pain last Monday, was told pinched nerve in C5, C6 area, has chiro adjustment and now having pain in right shoulder and numbness to thumb.  Pt denies any recent trauma or injury.

## 2021-08-18 NOTE — CONSULTS
Neurosurgery consult note    Chief complaint: Severe neck and left arm pain with a herniated disc at C5-6      HPI:  The patient is a 36-year-old male who presents to the emergency room today with a weeklong history of moderate to severe neck pain that radiates into his left shoulder, arm and hand.  He reports he has some numbness in his left thumb as well as abnormal sensation throughout his left arm.  He denies any difficulty walking.  Denies any pain, numbness, or weakness in the right upper extremity.  He had an MRI after arrival to the emergency room which showed degenerative changes at C5-6 and C6-7 with a large disc herniation at C5-6 which is eccentric to the left causing some cord deformity.  Neurosurgery was consulted to evaluate the MRI and provide recommendations.  On further review of his medical history he indicates that he had a work-related injury 5 years ago and a small disc herniation at C5-6 at that time.  He reports that he had completely recovered from that injury until about 8 or 9 days ago when he had been lifting a lot of heavy objects at home and replaced a kitchen sink.    All other review of systems was negative    History reviewed. No pertinent past medical history.  Past Surgical History:   Procedure Laterality Date   • WISDOM TOOTH EXTRACTION       Social History     Socioeconomic History   • Marital status:      Spouse name: Not on file   • Number of children: Not on file   • Years of education: Not on file   • Highest education level: Not on file   Tobacco Use   • Smoking status: Current Every Day Smoker     Packs/day: 0.25   • Smokeless tobacco: Never Used   Substance and Sexual Activity   • Alcohol use: No   • Drug use: No   • Sexual activity: Yes     Family History   Problem Relation Age of Onset   • Diabetes Mother    • Hypertension Mother      Scheduled Meds:dexamethasone, 4 mg, Intravenous, Q6H  gabapentin, 400 mg, Oral, TID      Continuous Infusions:   PRN Meds:.Insert  peripheral IV **AND** sodium chloride     No Known Allergies      Physical exam  Awake, alert, oriented x3  Pupils equal round reactive to light  Extraocular muscles intact  Face symmetric  Speech is fluent and clear  No pronator drift  Motor exam  Left deltoid 4/5, left bicep 4/5, left tricep 4+/5, left wrist extension 4+/5, left hand  4/5   Right deltoid, bicep, tricep, wrist extension, hand  all 5/5 strength throughout   bilateral hip flexion 5/5, bilateral knee extension 5/5, bilateral DF/PF 5/5  No clonus  No Ava's reflex  Gait deferred, however he indicates no gait instability  Able to detect  light touch in all 4 extremities with abnormal sensation over the left forearm and thumb      Imaging  Personally reviewed the MRI cervical spine without contrast from August 18, 2021 and January 5, 2018  The current MRI images show a new large disc herniation with a 9 mm x 3 mm para central fragment eccentric to the left causing cord deformity.  There is no cord signal abnormality.  There is also a mild disc bulge at C6-7 which is fairly stable from the previous MRI in 2018    Assessment/plan  36-year-old male with a C5-6 disc herniation with left arm pain and weakness  -He reports that over the past 8 to 9 days he has gotten progressively worse with worsening neck and arm pain.  He also indicates weakness and clumsiness in the left hand.  He denies any weakness in the right hand or difficulty walking.  I had a long discussion with him over management strategies for cervical disc herniations.  We talked about conservative management with steroids, pain medication, and physical therapy.  He is uncomfortable being discharged home at this time because of his severe pain and arm weakness and would prefer to be monitored overnight.  If his symptoms are not improving or he gets worse despite steroids and pain control will consider a more urgent surgical intervention.   -I have recommended that we admit him  overnight for observation as well as pain control.  Recommend start IV steroids as well as gabapentin.  -Recommend maintain n.p.o. past midnight and I will reevaluate his symptoms in the morning.  If there is any worsening of his weakness and numbness will consider urgent anterior cervical discectomy and fusion.

## 2021-08-18 NOTE — ED PROVIDER NOTES
Turnover note    I received care of this patient from my partner Dr. Dexter Crain.  Patient presented to the ED with worsening pain in the neck radiating down into the left upper extremity.  Patient had CT scan that showed bulging disc and MRI was recommended.  I was turned over care pending results of MRI with plan to likely discharge home unless there was significant acute pathology noted.    I discussed MRI findings with reading radiologist Dr. Harsha Benites.  He reports that there is worsening of disc herniation at C5/6.  This disc herniation does abut the cord but does not cause significant cord compression.  There is also slight worsening at C6/7 when compared with MRI from 2018.    Brief physical exam  On my exam patient has very mild weakness of the left .  He is able to elevate left arm readily.  He has some decreased sensation in the left thumb.    Plan we will discuss evaluation of this patient with on-call neurosurgery team.    5867  I discussed evaluation of this patient with Jessenia Mendenhall from the neurosurgery team who will set patient up for outpatient appointment in the near future.  She does agree with outpatient management at this time.    1039  I discussed evaluation this patient with Dr. Bynum from neurosurgery who saw patient in the ED.  He feels that patient has pretty significant nerve compression and would like to admit to the hospital to follow over the next 24 hours.  He would like to go ahead and start patient on gabapentin and Decadron.  He asked that the hospitalist admit this patient if possible.  He does not plan to operate acutely but may do so if patient has any worsening.  We will go ahead and send off routine labs including Covid testing.  We will give IV Decadron and oral gabapentin per recommendations from Dr. Bynum.    1320  I spoke with Dr. Harsha Lynch from [facility] who asked that patient be admitted by the neurosurgery service as patient has no chronic medical problems and  takes no medications.  Dr. Mendieta spoke with Dr. Bynum who said he would admit.    Final diagnoses:   Herniation of intervertebral disc at C5-C6 level     DISPOSTION  Admission     Edgar Marinelli MD  08/18/21 8914

## 2021-08-19 PROCEDURE — G0378 HOSPITAL OBSERVATION PER HR: HCPCS

## 2021-08-19 PROCEDURE — 25010000002 DEXAMETHASONE SODIUM PHOSPHATE 20 MG/5ML SOLUTION: Performed by: NURSE PRACTITIONER

## 2021-08-19 RX ADMIN — FAMOTIDINE 20 MG: 10 INJECTION INTRAVENOUS at 09:02

## 2021-08-19 RX ADMIN — DEXAMETHASONE SODIUM PHOSPHATE 4 MG: 4 INJECTION, SOLUTION INTRAMUSCULAR; INTRAVENOUS at 23:02

## 2021-08-19 RX ADMIN — DEXAMETHASONE SODIUM PHOSPHATE 4 MG: 4 INJECTION, SOLUTION INTRAMUSCULAR; INTRAVENOUS at 09:39

## 2021-08-19 RX ADMIN — GABAPENTIN 400 MG: 400 CAPSULE ORAL at 09:05

## 2021-08-19 RX ADMIN — SODIUM CHLORIDE, PRESERVATIVE FREE 10 ML: 5 INJECTION INTRAVENOUS at 09:02

## 2021-08-19 RX ADMIN — HYDROCODONE BITARTRATE AND ACETAMINOPHEN 1 TABLET: 7.5; 325 TABLET ORAL at 00:44

## 2021-08-19 RX ADMIN — HYDROCODONE BITARTRATE AND ACETAMINOPHEN 1 TABLET: 7.5; 325 TABLET ORAL at 09:45

## 2021-08-19 RX ADMIN — GABAPENTIN 400 MG: 400 CAPSULE ORAL at 16:57

## 2021-08-19 RX ADMIN — HYDROCODONE BITARTRATE AND ACETAMINOPHEN 1 TABLET: 7.5; 325 TABLET ORAL at 14:46

## 2021-08-19 RX ADMIN — FAMOTIDINE 20 MG: 10 INJECTION INTRAVENOUS at 21:00

## 2021-08-19 RX ADMIN — HYDROCODONE BITARTRATE AND ACETAMINOPHEN 1 TABLET: 7.5; 325 TABLET ORAL at 19:50

## 2021-08-19 RX ADMIN — DEXAMETHASONE SODIUM PHOSPHATE 4 MG: 4 INJECTION, SOLUTION INTRAMUSCULAR; INTRAVENOUS at 04:49

## 2021-08-19 RX ADMIN — HYDROCODONE BITARTRATE AND ACETAMINOPHEN 1 TABLET: 7.5; 325 TABLET ORAL at 05:26

## 2021-08-19 RX ADMIN — GABAPENTIN 400 MG: 400 CAPSULE ORAL at 21:00

## 2021-08-19 RX ADMIN — SODIUM CHLORIDE, PRESERVATIVE FREE 10 ML: 5 INJECTION INTRAVENOUS at 21:00

## 2021-08-19 RX ADMIN — DEXAMETHASONE SODIUM PHOSPHATE 4 MG: 4 INJECTION, SOLUTION INTRAMUSCULAR; INTRAVENOUS at 16:56

## 2021-08-19 NOTE — PLAN OF CARE
A+Ox4. Up SBA. Pain controlled w/ PO meds. Tingling/numbness left shoulder to elbow. NPO at MN for sx tomorrow. Consent signed on chart. CTM and progress towards goals as tolerated.               Goal Outcome Evaluation:

## 2021-08-19 NOTE — PLAN OF CARE
-End of shift summary-    A&O: x4  O2: RA  Heart Rhythm: SR  Pain: to LUE- controlled with current Norco prn  Diet: Tolerates, NPO at this time.  Voiding: Yes  BM: 8/17 per pt  Ambulation: Ambulates, steady  Skin: Intact  Event: No new event  POC: Possible surgery today with neurosurgery- NPO since midnight.

## 2021-08-20 ENCOUNTER — ANESTHESIA EVENT (OUTPATIENT)
Dept: PERIOP | Facility: HOSPITAL | Age: 36
End: 2021-08-20

## 2021-08-20 ENCOUNTER — ANESTHESIA (OUTPATIENT)
Dept: PERIOP | Facility: HOSPITAL | Age: 36
End: 2021-08-20

## 2021-08-20 ENCOUNTER — APPOINTMENT (OUTPATIENT)
Dept: GENERAL RADIOLOGY | Facility: HOSPITAL | Age: 36
End: 2021-08-20

## 2021-08-20 PROCEDURE — 25010000003 CEFAZOLIN IN DEXTROSE 2-4 GM/100ML-% SOLUTION: Performed by: NEUROLOGICAL SURGERY

## 2021-08-20 PROCEDURE — C1889 IMPLANT/INSERT DEVICE, NOC: HCPCS | Performed by: NEUROLOGICAL SURGERY

## 2021-08-20 PROCEDURE — C1713 ANCHOR/SCREW BN/BN,TIS/BN: HCPCS | Performed by: NEUROLOGICAL SURGERY

## 2021-08-20 PROCEDURE — 22551 ARTHRD ANT NTRBDY CERVICAL: CPT | Performed by: NEUROLOGICAL SURGERY

## 2021-08-20 PROCEDURE — 76000 FLUOROSCOPY <1 HR PHYS/QHP: CPT | Performed by: NEUROLOGICAL SURGERY

## 2021-08-20 PROCEDURE — 25010000002 HYDROMORPHONE PER 4 MG: Performed by: REGISTERED NURSE

## 2021-08-20 PROCEDURE — 25010000002 DEXAMETHASONE SODIUM PHOSPHATE 20 MG/5ML SOLUTION: Performed by: NURSE PRACTITIONER

## 2021-08-20 PROCEDURE — 25010000002 FENTANYL CITRATE (PF) 50 MCG/ML SOLUTION: Performed by: REGISTERED NURSE

## 2021-08-20 PROCEDURE — 25010000002 FENTANYL CITRATE (PF) 50 MCG/ML SOLUTION: Performed by: ANESTHESIOLOGY

## 2021-08-20 PROCEDURE — 72040 X-RAY EXAM NECK SPINE 2-3 VW: CPT | Performed by: NEUROLOGICAL SURGERY

## 2021-08-20 PROCEDURE — 22551 ARTHRD ANT NTRBDY CERVICAL: CPT | Performed by: SPECIALIST/TECHNOLOGIST, OTHER

## 2021-08-20 PROCEDURE — L0174 CERV SR 2PC THOR EXT PRE OTS: HCPCS | Performed by: NEUROLOGICAL SURGERY

## 2021-08-20 PROCEDURE — 25010000002 SUCCINYLCHOLINE PER 20 MG: Performed by: REGISTERED NURSE

## 2021-08-20 PROCEDURE — 25010000002 ONDANSETRON PER 1 MG: Performed by: REGISTERED NURSE

## 2021-08-20 PROCEDURE — 25010000003 CEFAZOLIN PER 500 MG: Performed by: NEUROLOGICAL SURGERY

## 2021-08-20 PROCEDURE — 0RB30ZZ EXCISION OF CERVICAL VERTEBRAL DISC, OPEN APPROACH: ICD-10-PCS | Performed by: NEUROLOGICAL SURGERY

## 2021-08-20 PROCEDURE — 25010000002 DEXAMETHASONE PER 1 MG: Performed by: REGISTERED NURSE

## 2021-08-20 PROCEDURE — 22853 INSJ BIOMECHANICAL DEVICE: CPT | Performed by: NEUROLOGICAL SURGERY

## 2021-08-20 PROCEDURE — 22853 INSJ BIOMECHANICAL DEVICE: CPT | Performed by: SPECIALIST/TECHNOLOGIST, OTHER

## 2021-08-20 PROCEDURE — 25010000002 MIDAZOLAM PER 1 MG: Performed by: ANESTHESIOLOGY

## 2021-08-20 PROCEDURE — 22845 INSERT SPINE FIXATION DEVICE: CPT | Performed by: NEUROLOGICAL SURGERY

## 2021-08-20 PROCEDURE — 0RG10A0 FUSION OF CERVICAL VERTEBRAL JOINT WITH INTERBODY FUSION DEVICE, ANTERIOR APPROACH, ANTERIOR COLUMN, OPEN APPROACH: ICD-10-PCS | Performed by: NEUROLOGICAL SURGERY

## 2021-08-20 PROCEDURE — 22845 INSERT SPINE FIXATION DEVICE: CPT | Performed by: SPECIALIST/TECHNOLOGIST, OTHER

## 2021-08-20 PROCEDURE — 25010000002 PROPOFOL 10 MG/ML EMULSION: Performed by: REGISTERED NURSE

## 2021-08-20 DEVICE — INTERBODY FUSION DEVICE NANOLOCK SURFACE TECHNOLOGY 6 DEGREE SMALL 7MM
Type: IMPLANTABLE DEVICE | Site: SPINE CERVICAL | Status: FUNCTIONAL
Brand: ENDOSKELETON TC

## 2021-08-20 DEVICE — SCREW 3120514 4.0 X 14 SELF DRILL VAR
Type: IMPLANTABLE DEVICE | Site: SPINE CERVICAL | Status: FUNCTIONAL
Brand: ATLANTIS® ANTERIOR CERVICAL PLATE SYSTEM

## 2021-08-20 DEVICE — FLOSEAL HEMOSTATIC MATRIX, 10ML
Type: IMPLANTABLE DEVICE | Site: SPINE CERVICAL | Status: FUNCTIONAL
Brand: FLOSEAL HEMOSTATIC MATRIX

## 2021-08-20 DEVICE — DBM T43102 1CC GRAFTON PUTTY
Type: IMPLANTABLE DEVICE | Site: SPINE CERVICAL | Status: FUNCTIONAL
Brand: GRAFTON®AND GRAFTON PLUS®DEMINERALIZED BONE MATRIX (DBM)

## 2021-08-20 RX ORDER — EPHEDRINE SULFATE 50 MG/ML
5 INJECTION, SOLUTION INTRAVENOUS ONCE AS NEEDED
Status: DISCONTINUED | OUTPATIENT
Start: 2021-08-20 | End: 2021-08-20 | Stop reason: HOSPADM

## 2021-08-20 RX ORDER — PROMETHAZINE HYDROCHLORIDE 25 MG/1
25 TABLET ORAL ONCE AS NEEDED
Status: DISCONTINUED | OUTPATIENT
Start: 2021-08-20 | End: 2021-08-20 | Stop reason: HOSPADM

## 2021-08-20 RX ORDER — LABETALOL HYDROCHLORIDE 5 MG/ML
5 INJECTION, SOLUTION INTRAVENOUS
Status: DISCONTINUED | OUTPATIENT
Start: 2021-08-20 | End: 2021-08-20 | Stop reason: HOSPADM

## 2021-08-20 RX ORDER — HYDROCODONE BITARTRATE AND ACETAMINOPHEN 7.5; 325 MG/1; MG/1
1 TABLET ORAL ONCE AS NEEDED
Status: DISCONTINUED | OUTPATIENT
Start: 2021-08-20 | End: 2021-08-20 | Stop reason: HOSPADM

## 2021-08-20 RX ORDER — PROPOFOL 10 MG/ML
VIAL (ML) INTRAVENOUS AS NEEDED
Status: DISCONTINUED | OUTPATIENT
Start: 2021-08-20 | End: 2021-08-20 | Stop reason: SURG

## 2021-08-20 RX ORDER — NALOXONE HCL 0.4 MG/ML
0.2 VIAL (ML) INJECTION AS NEEDED
Status: DISCONTINUED | OUTPATIENT
Start: 2021-08-20 | End: 2021-08-20 | Stop reason: HOSPADM

## 2021-08-20 RX ORDER — SODIUM CHLORIDE 0.9 % (FLUSH) 0.9 %
3 SYRINGE (ML) INJECTION EVERY 12 HOURS SCHEDULED
Status: DISCONTINUED | OUTPATIENT
Start: 2021-08-20 | End: 2021-08-20 | Stop reason: HOSPADM

## 2021-08-20 RX ORDER — MORPHINE SULFATE 2 MG/ML
2 INJECTION, SOLUTION INTRAMUSCULAR; INTRAVENOUS
Status: DISCONTINUED | OUTPATIENT
Start: 2021-08-20 | End: 2021-08-22

## 2021-08-20 RX ORDER — HYDROMORPHONE HYDROCHLORIDE 1 MG/ML
0.5 INJECTION, SOLUTION INTRAMUSCULAR; INTRAVENOUS; SUBCUTANEOUS
Status: DISCONTINUED | OUTPATIENT
Start: 2021-08-20 | End: 2021-08-20 | Stop reason: HOSPADM

## 2021-08-20 RX ORDER — HYDRALAZINE HYDROCHLORIDE 20 MG/ML
5 INJECTION INTRAMUSCULAR; INTRAVENOUS
Status: DISCONTINUED | OUTPATIENT
Start: 2021-08-20 | End: 2021-08-20 | Stop reason: HOSPADM

## 2021-08-20 RX ORDER — FENTANYL CITRATE 50 UG/ML
INJECTION, SOLUTION INTRAMUSCULAR; INTRAVENOUS AS NEEDED
Status: DISCONTINUED | OUTPATIENT
Start: 2021-08-20 | End: 2021-08-20 | Stop reason: SURG

## 2021-08-20 RX ORDER — SUCCINYLCHOLINE CHLORIDE 20 MG/ML
INJECTION INTRAMUSCULAR; INTRAVENOUS AS NEEDED
Status: DISCONTINUED | OUTPATIENT
Start: 2021-08-20 | End: 2021-08-20 | Stop reason: SURG

## 2021-08-20 RX ORDER — ONDANSETRON 2 MG/ML
INJECTION INTRAMUSCULAR; INTRAVENOUS AS NEEDED
Status: DISCONTINUED | OUTPATIENT
Start: 2021-08-20 | End: 2021-08-20 | Stop reason: SURG

## 2021-08-20 RX ORDER — FAMOTIDINE 10 MG/ML
20 INJECTION, SOLUTION INTRAVENOUS ONCE
Status: COMPLETED | OUTPATIENT
Start: 2021-08-20 | End: 2021-08-20

## 2021-08-20 RX ORDER — ROCURONIUM BROMIDE 10 MG/ML
INJECTION, SOLUTION INTRAVENOUS AS NEEDED
Status: DISCONTINUED | OUTPATIENT
Start: 2021-08-20 | End: 2021-08-20 | Stop reason: SURG

## 2021-08-20 RX ORDER — SODIUM CHLORIDE 0.9 % (FLUSH) 0.9 %
3-10 SYRINGE (ML) INJECTION AS NEEDED
Status: DISCONTINUED | OUTPATIENT
Start: 2021-08-20 | End: 2021-08-20 | Stop reason: HOSPADM

## 2021-08-20 RX ORDER — OXYCODONE HYDROCHLORIDE AND ACETAMINOPHEN 5; 325 MG/1; MG/1
2 TABLET ORAL EVERY 4 HOURS PRN
Status: DISCONTINUED | OUTPATIENT
Start: 2021-08-20 | End: 2021-08-23 | Stop reason: HOSPADM

## 2021-08-20 RX ORDER — FLUMAZENIL 0.1 MG/ML
0.2 INJECTION INTRAVENOUS AS NEEDED
Status: DISCONTINUED | OUTPATIENT
Start: 2021-08-20 | End: 2021-08-20 | Stop reason: HOSPADM

## 2021-08-20 RX ORDER — SODIUM CHLORIDE 9 MG/ML
INJECTION, SOLUTION INTRAVENOUS AS NEEDED
Status: DISCONTINUED | OUTPATIENT
Start: 2021-08-20 | End: 2021-08-20 | Stop reason: HOSPADM

## 2021-08-20 RX ORDER — ONDANSETRON 2 MG/ML
4 INJECTION INTRAMUSCULAR; INTRAVENOUS ONCE AS NEEDED
Status: DISCONTINUED | OUTPATIENT
Start: 2021-08-20 | End: 2021-08-20 | Stop reason: HOSPADM

## 2021-08-20 RX ORDER — DIPHENHYDRAMINE HYDROCHLORIDE 50 MG/ML
12.5 INJECTION INTRAMUSCULAR; INTRAVENOUS
Status: DISCONTINUED | OUTPATIENT
Start: 2021-08-20 | End: 2021-08-20 | Stop reason: HOSPADM

## 2021-08-20 RX ORDER — CEFAZOLIN SODIUM 2 G/100ML
2 INJECTION, SOLUTION INTRAVENOUS EVERY 8 HOURS
Status: COMPLETED | OUTPATIENT
Start: 2021-08-20 | End: 2021-08-20

## 2021-08-20 RX ORDER — METHOCARBAMOL 100 MG/ML
500 INJECTION, SOLUTION INTRAMUSCULAR; INTRAVENOUS EVERY 8 HOURS PRN
Status: DISCONTINUED | OUTPATIENT
Start: 2021-08-20 | End: 2021-08-21

## 2021-08-20 RX ORDER — LIDOCAINE HYDROCHLORIDE AND EPINEPHRINE 10; 10 MG/ML; UG/ML
INJECTION, SOLUTION INFILTRATION; PERINEURAL AS NEEDED
Status: DISCONTINUED | OUTPATIENT
Start: 2021-08-20 | End: 2021-08-20 | Stop reason: HOSPADM

## 2021-08-20 RX ORDER — FENTANYL CITRATE 50 UG/ML
50 INJECTION, SOLUTION INTRAMUSCULAR; INTRAVENOUS
Status: DISCONTINUED | OUTPATIENT
Start: 2021-08-20 | End: 2021-08-20 | Stop reason: HOSPADM

## 2021-08-20 RX ORDER — OXYCODONE AND ACETAMINOPHEN 10; 325 MG/1; MG/1
1 TABLET ORAL EVERY 4 HOURS PRN
Status: DISCONTINUED | OUTPATIENT
Start: 2021-08-20 | End: 2021-08-20 | Stop reason: HOSPADM

## 2021-08-20 RX ORDER — PROMETHAZINE HYDROCHLORIDE 25 MG/1
25 SUPPOSITORY RECTAL ONCE AS NEEDED
Status: DISCONTINUED | OUTPATIENT
Start: 2021-08-20 | End: 2021-08-20 | Stop reason: HOSPADM

## 2021-08-20 RX ORDER — KETAMINE HYDROCHLORIDE 10 MG/ML
INJECTION INTRAMUSCULAR; INTRAVENOUS AS NEEDED
Status: DISCONTINUED | OUTPATIENT
Start: 2021-08-20 | End: 2021-08-20 | Stop reason: SURG

## 2021-08-20 RX ORDER — LIDOCAINE HYDROCHLORIDE 10 MG/ML
0.5 INJECTION, SOLUTION EPIDURAL; INFILTRATION; INTRACAUDAL; PERINEURAL ONCE AS NEEDED
Status: DISCONTINUED | OUTPATIENT
Start: 2021-08-20 | End: 2021-08-20 | Stop reason: HOSPADM

## 2021-08-20 RX ORDER — DIPHENHYDRAMINE HCL 25 MG
25 CAPSULE ORAL
Status: DISCONTINUED | OUTPATIENT
Start: 2021-08-20 | End: 2021-08-20 | Stop reason: HOSPADM

## 2021-08-20 RX ORDER — LIDOCAINE HYDROCHLORIDE 20 MG/ML
INJECTION, SOLUTION INFILTRATION; PERINEURAL AS NEEDED
Status: DISCONTINUED | OUTPATIENT
Start: 2021-08-20 | End: 2021-08-20 | Stop reason: SURG

## 2021-08-20 RX ORDER — MIDAZOLAM HYDROCHLORIDE 1 MG/ML
1 INJECTION INTRAMUSCULAR; INTRAVENOUS
Status: DISCONTINUED | OUTPATIENT
Start: 2021-08-20 | End: 2021-08-20 | Stop reason: HOSPADM

## 2021-08-20 RX ORDER — IBUPROFEN 600 MG/1
600 TABLET ORAL ONCE AS NEEDED
Status: DISCONTINUED | OUTPATIENT
Start: 2021-08-20 | End: 2021-08-20 | Stop reason: HOSPADM

## 2021-08-20 RX ORDER — HYDROMORPHONE HCL 110MG/55ML
PATIENT CONTROLLED ANALGESIA SYRINGE INTRAVENOUS AS NEEDED
Status: DISCONTINUED | OUTPATIENT
Start: 2021-08-20 | End: 2021-08-20 | Stop reason: SURG

## 2021-08-20 RX ORDER — SODIUM CHLORIDE, SODIUM LACTATE, POTASSIUM CHLORIDE, CALCIUM CHLORIDE 600; 310; 30; 20 MG/100ML; MG/100ML; MG/100ML; MG/100ML
9 INJECTION, SOLUTION INTRAVENOUS CONTINUOUS
Status: DISCONTINUED | OUTPATIENT
Start: 2021-08-20 | End: 2021-08-22

## 2021-08-20 RX ORDER — DEXAMETHASONE SODIUM PHOSPHATE 10 MG/ML
INJECTION INTRAMUSCULAR; INTRAVENOUS AS NEEDED
Status: DISCONTINUED | OUTPATIENT
Start: 2021-08-20 | End: 2021-08-20 | Stop reason: SURG

## 2021-08-20 RX ORDER — GABAPENTIN 300 MG/1
300 CAPSULE ORAL EVERY 8 HOURS SCHEDULED
Status: DISCONTINUED | OUTPATIENT
Start: 2021-08-21 | End: 2021-08-21

## 2021-08-20 RX ADMIN — SUCCINYLCHOLINE CHLORIDE 150 MG: 20 INJECTION, SOLUTION INTRAMUSCULAR; INTRAVENOUS; PARENTERAL at 11:35

## 2021-08-20 RX ADMIN — ROCURONIUM BROMIDE 20 MG: 50 INJECTION INTRAVENOUS at 12:05

## 2021-08-20 RX ADMIN — HYDROCODONE BITARTRATE AND ACETAMINOPHEN 1 TABLET: 7.5; 325 TABLET ORAL at 04:06

## 2021-08-20 RX ADMIN — FENTANYL CITRATE 50 MCG: 50 INJECTION, SOLUTION INTRAMUSCULAR; INTRAVENOUS at 17:28

## 2021-08-20 RX ADMIN — SUGAMMADEX 200 MG: 100 INJECTION, SOLUTION INTRAVENOUS at 16:11

## 2021-08-20 RX ADMIN — HYDROMORPHONE HYDROCHLORIDE 0.5 MG: 2 INJECTION, SOLUTION INTRAMUSCULAR; INTRAVENOUS; SUBCUTANEOUS at 12:18

## 2021-08-20 RX ADMIN — PROPOFOL 200 MG: 10 INJECTION, EMULSION INTRAVENOUS at 11:35

## 2021-08-20 RX ADMIN — SODIUM CHLORIDE, POTASSIUM CHLORIDE, SODIUM LACTATE AND CALCIUM CHLORIDE: 600; 310; 30; 20 INJECTION, SOLUTION INTRAVENOUS at 14:41

## 2021-08-20 RX ADMIN — OXYCODONE AND ACETAMINOPHEN 2 TABLET: 5; 325 TABLET ORAL at 20:52

## 2021-08-20 RX ADMIN — FAMOTIDINE 20 MG: 10 INJECTION INTRAVENOUS at 10:41

## 2021-08-20 RX ADMIN — SODIUM CHLORIDE, POTASSIUM CHLORIDE, SODIUM LACTATE AND CALCIUM CHLORIDE 9 ML/HR: 600; 310; 30; 20 INJECTION, SOLUTION INTRAVENOUS at 10:41

## 2021-08-20 RX ADMIN — CEFAZOLIN SODIUM 2 G: 2 INJECTION, SOLUTION INTRAVENOUS at 20:53

## 2021-08-20 RX ADMIN — PROPOFOL 200 MCG/KG/MIN: 10 INJECTION, EMULSION INTRAVENOUS at 11:37

## 2021-08-20 RX ADMIN — MIDAZOLAM 1 MG: 1 INJECTION INTRAMUSCULAR; INTRAVENOUS at 10:41

## 2021-08-20 RX ADMIN — KETAMINE HYDROCHLORIDE 25 MG: 10 INJECTION INTRAMUSCULAR; INTRAVENOUS at 11:39

## 2021-08-20 RX ADMIN — CEFAZOLIN SODIUM 2 G: 2 INJECTION, SOLUTION INTRAVENOUS at 11:10

## 2021-08-20 RX ADMIN — HYDROMORPHONE HYDROCHLORIDE 1 MG: 2 INJECTION, SOLUTION INTRAMUSCULAR; INTRAVENOUS; SUBCUTANEOUS at 14:05

## 2021-08-20 RX ADMIN — KETAMINE HYDROCHLORIDE 25 MG: 10 INJECTION INTRAMUSCULAR; INTRAVENOUS at 12:45

## 2021-08-20 RX ADMIN — FENTANYL CITRATE 100 MCG: 50 INJECTION INTRAMUSCULAR; INTRAVENOUS at 11:35

## 2021-08-20 RX ADMIN — ONDANSETRON 4 MG: 2 INJECTION INTRAMUSCULAR; INTRAVENOUS at 11:55

## 2021-08-20 RX ADMIN — FENTANYL CITRATE 50 MCG: 50 INJECTION, SOLUTION INTRAMUSCULAR; INTRAVENOUS at 10:41

## 2021-08-20 RX ADMIN — FAMOTIDINE 20 MG: 10 INJECTION INTRAVENOUS at 22:12

## 2021-08-20 RX ADMIN — DEXAMETHASONE SODIUM PHOSPHATE 4 MG: 4 INJECTION, SOLUTION INTRAMUSCULAR; INTRAVENOUS at 03:59

## 2021-08-20 RX ADMIN — LIDOCAINE HYDROCHLORIDE 100 MG: 20 INJECTION, SOLUTION INFILTRATION; PERINEURAL at 11:35

## 2021-08-20 RX ADMIN — SODIUM CHLORIDE, PRESERVATIVE FREE 10 ML: 5 INJECTION INTRAVENOUS at 22:13

## 2021-08-20 RX ADMIN — CEFAZOLIN SODIUM 2 G: 2 INJECTION, SOLUTION INTRAVENOUS at 15:10

## 2021-08-20 RX ADMIN — DEXAMETHASONE SODIUM PHOSPHATE 10 MG: 10 INJECTION INTRAMUSCULAR; INTRAVENOUS at 11:55

## 2021-08-20 NOTE — ANESTHESIA POSTPROCEDURE EVALUATION
Patient: Abdi Tyler    Procedure Summary     Date: 08/20/21 Room / Location: [facility] OR  / [facility] MAIN OR    Anesthesia Start: 1120 Anesthesia Stop: 1642    Procedure: C5-6 ACDF (N/A Spine Cervical) Diagnosis:       Herniation of intervertebral disc at C5-C6 level      (Herniation of intervertebral disc at C5-C6 level [M50.222])    Surgeons: Robert Bynum MD Provider: Ladarius Erickson MD    Anesthesia Type: general ASA Status: 3          Anesthesia Type: general    Vitals  Vitals Value Taken Time   /85 08/20/21 1812   Temp 36.7 °C (98 °F) 08/20/21 1805   Pulse 85 08/20/21 1812   Resp 16 08/20/21 1812   SpO2 98 % 08/20/21 1812           Post Anesthesia Care and Evaluation    Patient location during evaluation: PACU    Comments: Patient was discharged from the PACU without being evaluated by anesthesia.  No apparent anesthetic complications were reported. Non-medically directed

## 2021-08-20 NOTE — PLAN OF CARE
Goal Outcome Evaluation:  Plan of Care Reviewed With: patient           Outcome Summary: Pt found to be conscious, alert, oriented, no obvious distress at beginning of shift.  Pt NPO after midnight per order.  Pt's pain level managed via MAR, positioning.  Will continue to monitor.

## 2021-08-20 NOTE — OP NOTE
Surgeon: Kalen Bynum MD  Assistant: Neli Maldonado,  assist    Procedure:   1.  Anterior cervical discectomy at C5-6 with intraoperative microscope and fluoroscopy  2.  Insertion of interbody graft at C5-6  3.  Anterior fixation with titanium metal plate and screws from C5 to C6      Preoperative diagnosis: HNP at C5-6 with severe cord compression and left arm weakness  Postoperative diagnosis: Same      Anesthesia: General endotracheal anesthesia      Indications/consent: Patient is a 36-year-old male with a history of a missed C5-6 disc herniation several years ago from an injury at work.  He presented to the emergency room recently with severe neck and arm pain as well as weakness in the left upper extremity.  And MRI was obtained which showed a herniated disc fragment at C5-6 eccentric to the left with cord compression.  On exam he had significant weakness throughout his upper extremity I was in severe pain.  He was admitted to the hospital for pain control started on steroids without any significant improvement.  He was consented for a urgent anterior cervical discectomy and fusion.  He was also offered the alternative of physical therapy and continued steroids to monitor for improvement however he believed his numbness, weakness was getting worse as well as the pain despite pain medication and steroids.  He was consented for a C5-6 ACDF.  The risks and benefits of this procedure were discussed with the patient including the risk of hemorrhage, nerve injury, CSF leak.  He expressed understanding of the risks and benefits of the procedure and elected to proceed.    Description of the procedure: The patient was brought into the operating room and a timeout was performed.  The patient was positioned in the supine position on a regular OR table a small roll was placed under his shoulder blades.  He was intubated with general endotracheal anesthesia.  Neuro monitoring was used and the electrodes were  placed.  Baseline x-rays were performed to localize the C5-6 levels using mobile fluoroscopy.  The neck was then marked prepped and draped in the usual sterile fashion.  He was given preoperative antibiotics.  An incision was made with a 10 blade knife, hemostasis was obtained using Bovie electrocautery and the platysma muscle was sharply incised using Metzenbaum scissors and the Bovie.  A plane was developed lateral to the trachea and esophagus and medial to the carotid artery down to the level of the spine.  A self-retaining retractor was put into place to hold this open and an intraoperative x-ray was obtained to verify the correct level.  The C5-6 disc space was then prepared and the discectomy was performed using an 11 blade knife, pituitary forceps, curette, high-speed electric drill.  The disc was completely removed and the PLL was then opened using a sharp nerve hook and #1 Kerrison.  The spinal cord was felt to be well decompressed at this level.  Next multiple trials were used for the interbody grafts.     A 7 mm x 14 mm x 2 mm interbody Reta cage was then tapped into place at the  C5-6 level.  Another x-ray was obtained to verify the correct position.  Next an anterior plate was selected and the anterior surface of the vertebral bodies was flattened using a 3 mm ariadna bur.  The plate was then secured with 4-14 mm screws.  Final intraoperative x-rays were obtained which verified the grafts and plate in good position.  The patient was then extubated and a c-collar was put in place and he was transferred to the recovery unit in stable condition.  Neli Maldonado was the first assist who helped with this case.  Her skilled assistance with retraction, suction, irrigation was essential for the completion of this case.      EBL: Less than 50 cc  Complications: None      Disposition: We will plan to admit to floor to watch overnight and discharge home in the morning.

## 2021-08-20 NOTE — ANESTHESIA PREPROCEDURE EVALUATION
Anesthesia Evaluation                  Airway   Mallampati: I  TM distance: >3 FB  Neck ROM: limited  No difficulty expected  Dental          Pulmonary - normal exam   (+) a smoker Current,   Cardiovascular - normal exam        Neuro/Psych  GI/Hepatic/Renal/Endo      Musculoskeletal         ROS comment: C5-C6 herniation disc  Left hand  strength is significantly weaker than right  Abdominal    Substance History      OB/GYN          Other                        Anesthesia Plan    ASA 3     general     intravenous induction     Anesthetic plan, all risks, benefits, and alternatives have been provided, discussed and informed consent has been obtained with: patient.

## 2021-08-20 NOTE — ANESTHESIA PROCEDURE NOTES
Airway  Urgency: elective    Date/Time: 8/20/2021 11:37 AM  Airway not difficult    General Information and Staff    Patient location during procedure: OR  CRNA: Belen Ramos CRNA    Indications and Patient Condition  Indications for airway management: airway protection    Preoxygenated: yes  MILS maintained throughout  Mask difficulty assessment: 1 - vent by mask    Final Airway Details  Final airway type: endotracheal airway      Successful airway: ETT  Cuffed: yes   Successful intubation technique: video laryngoscopy  Endotracheal tube insertion site: oral  Blade: CMAC  Blade size: D  ETT size (mm): 7.5  Cormack-Lehane Classification: grade I - full view of glottis  Placement verified by: chest auscultation and capnometry   Measured from: lips  ETT/EBT  to lips (cm): 21  Number of attempts at approach: 1  Assessment: lips, teeth, and gum same as pre-op and atraumatic intubation    Additional Comments  Atraumatic insertion

## 2021-08-21 ENCOUNTER — APPOINTMENT (OUTPATIENT)
Dept: GENERAL RADIOLOGY | Facility: HOSPITAL | Age: 36
End: 2021-08-21

## 2021-08-21 PROCEDURE — 25010000002 DEXAMETHASONE PER 1 MG: Performed by: NURSE PRACTITIONER

## 2021-08-21 PROCEDURE — 72040 X-RAY EXAM NECK SPINE 2-3 VW: CPT

## 2021-08-21 PROCEDURE — 99024 POSTOP FOLLOW-UP VISIT: CPT | Performed by: NEUROLOGICAL SURGERY

## 2021-08-21 RX ORDER — GABAPENTIN 400 MG/1
400 CAPSULE ORAL EVERY 8 HOURS SCHEDULED
Status: DISCONTINUED | OUTPATIENT
Start: 2021-08-21 | End: 2021-08-23 | Stop reason: HOSPADM

## 2021-08-21 RX ORDER — DEXAMETHASONE SODIUM PHOSPHATE 4 MG/ML
4 INJECTION, SOLUTION INTRA-ARTICULAR; INTRALESIONAL; INTRAMUSCULAR; INTRAVENOUS; SOFT TISSUE EVERY 6 HOURS
Status: COMPLETED | OUTPATIENT
Start: 2021-08-21 | End: 2021-08-22

## 2021-08-21 RX ORDER — METHOCARBAMOL 500 MG/1
750 TABLET, FILM COATED ORAL EVERY 6 HOURS PRN
Status: DISCONTINUED | OUTPATIENT
Start: 2021-08-21 | End: 2021-08-23 | Stop reason: HOSPADM

## 2021-08-21 RX ADMIN — OXYCODONE AND ACETAMINOPHEN 2 TABLET: 5; 325 TABLET ORAL at 01:01

## 2021-08-21 RX ADMIN — DEXAMETHASONE SODIUM PHOSPHATE 4 MG: 4 INJECTION, SOLUTION INTRAMUSCULAR; INTRAVENOUS at 14:29

## 2021-08-21 RX ADMIN — GABAPENTIN 400 MG: 400 CAPSULE ORAL at 21:41

## 2021-08-21 RX ADMIN — OXYCODONE AND ACETAMINOPHEN 2 TABLET: 5; 325 TABLET ORAL at 05:20

## 2021-08-21 RX ADMIN — GABAPENTIN 400 MG: 400 CAPSULE ORAL at 14:29

## 2021-08-21 RX ADMIN — GABAPENTIN 300 MG: 300 CAPSULE ORAL at 05:20

## 2021-08-21 RX ADMIN — DEXAMETHASONE SODIUM PHOSPHATE 4 MG: 4 INJECTION, SOLUTION INTRAMUSCULAR; INTRAVENOUS at 21:41

## 2021-08-21 RX ADMIN — SODIUM CHLORIDE, PRESERVATIVE FREE 10 ML: 5 INJECTION INTRAVENOUS at 21:42

## 2021-08-21 RX ADMIN — OXYCODONE AND ACETAMINOPHEN 2 TABLET: 5; 325 TABLET ORAL at 23:28

## 2021-08-21 RX ADMIN — OXYCODONE AND ACETAMINOPHEN 2 TABLET: 5; 325 TABLET ORAL at 09:53

## 2021-08-21 RX ADMIN — OXYCODONE AND ACETAMINOPHEN 2 TABLET: 5; 325 TABLET ORAL at 17:26

## 2021-08-21 RX ADMIN — DEXAMETHASONE SODIUM PHOSPHATE 4 MG: 4 INJECTION, SOLUTION INTRAMUSCULAR; INTRAVENOUS at 08:10

## 2021-08-21 RX ADMIN — FAMOTIDINE 20 MG: 10 INJECTION INTRAVENOUS at 21:41

## 2021-08-21 RX ADMIN — FAMOTIDINE 20 MG: 10 INJECTION INTRAVENOUS at 08:03

## 2021-08-21 RX ADMIN — SODIUM CHLORIDE, PRESERVATIVE FREE 10 ML: 5 INJECTION INTRAVENOUS at 08:08

## 2021-08-21 NOTE — PLAN OF CARE
Goal Outcome Evaluation:  Plan of Care Reviewed With: patient           Outcome Summary: Pt found responsive to voice, alert, oriented.  Pt maintained clear liquid diet, pain managed via PO medication.  Pt has border dressing anterior to neck.  Pt able to be roused w/o difficult.  Will continue to monitor.

## 2021-08-21 NOTE — PROGRESS NOTES
Neurosurgery progress note    Postop day #1 s/p C5-6 ACDF        Subjective: Reports improvement in sensation and strength in the left upper extremity, but still has residual left shoulder pain.  Also complains of pain from the surgical site.  No difficulty with swallowing    Objective:  Vitals:    08/20/21 1908 08/20/21 2042 08/21/21 0028 08/21/21 0459   BP: 133/87  129/88 131/91   BP Location: Right arm  Right arm Right arm   Patient Position: Lying  Lying Lying   Pulse: 75  70 74   Resp: 18  16 18   Temp:  97.1 °F (36.2 °C) 97.2 °F (36.2 °C) 97.3 °F (36.3 °C)   TempSrc:  Oral Oral Oral   SpO2: 97%  98% 98%   Weight:       Height:         Physical exam  Awake, alert, oriented x3  Pupils equal round reactive to light  Extraocular muscles intact  Face symmetric  Speech is fluent and clear  No pronator drift  Motor exam  Bilateral deltoids 5/5, bilateral biceps 5/5, bilateral triceps 5/5, bilateral wrist extension 5/5 bilateral hand  5/5  Bilateral hip flexion 5/5, bilateral knee extension 5/5, bilateral DF/PF 5/5  Gait deferred  Able to detect  light touch in all 4 extremities      Assessment/plan  36-year-old male postoperative day #1 s/p C5-6 ACDF  -He appears to be recovering well from the procedure.  His left arm weakness has resolved and left sided numbness has decreased.  He reports that he still has some residual left shoulder pain.   -Plan to evaluate by PT/OT today  -Continue with pain control with as needed morphine, Percocet, Robaxin  -I have ordered an x-ray with flexion-extension views to obtain a new baseline and evaluate the hardware position.

## 2021-08-22 PROCEDURE — 99024 POSTOP FOLLOW-UP VISIT: CPT | Performed by: NEUROLOGICAL SURGERY

## 2021-08-22 PROCEDURE — 25010000002 DEXAMETHASONE PER 1 MG: Performed by: NURSE PRACTITIONER

## 2021-08-22 PROCEDURE — 63710000001 METHYLPREDNISOLONE 4 MG TABLET THERAPY PACK 21 EACH DISP PACK: Performed by: NURSE PRACTITIONER

## 2021-08-22 RX ORDER — FAMOTIDINE 20 MG/1
20 TABLET, FILM COATED ORAL
Status: DISCONTINUED | OUTPATIENT
Start: 2021-08-22 | End: 2021-08-23 | Stop reason: HOSPADM

## 2021-08-22 RX ORDER — METHYLPREDNISOLONE 4 MG/1
4 TABLET ORAL
Status: DISCONTINUED | OUTPATIENT
Start: 2021-08-26 | End: 2021-08-23 | Stop reason: HOSPADM

## 2021-08-22 RX ORDER — METHYLPREDNISOLONE 4 MG/1
4 TABLET ORAL
Status: DISCONTINUED | OUTPATIENT
Start: 2021-08-24 | End: 2021-08-23 | Stop reason: HOSPADM

## 2021-08-22 RX ORDER — METHYLPREDNISOLONE 4 MG/1
4 TABLET ORAL
Status: DISCONTINUED | OUTPATIENT
Start: 2021-08-25 | End: 2021-08-23 | Stop reason: HOSPADM

## 2021-08-22 RX ORDER — METHYLPREDNISOLONE 4 MG/1
4 TABLET ORAL
Status: DISCONTINUED | OUTPATIENT
Start: 2021-08-23 | End: 2021-08-23 | Stop reason: HOSPADM

## 2021-08-22 RX ORDER — METHYLPREDNISOLONE 4 MG/1
4 TABLET ORAL
Status: DISCONTINUED | OUTPATIENT
Start: 2021-08-27 | End: 2021-08-23 | Stop reason: HOSPADM

## 2021-08-22 RX ORDER — METHYLPREDNISOLONE 4 MG/1
24 TABLET ORAL ONCE
Status: COMPLETED | OUTPATIENT
Start: 2021-08-22 | End: 2021-08-22

## 2021-08-22 RX ORDER — METHYLPREDNISOLONE 4 MG/1
8 TABLET ORAL
Status: DISCONTINUED | OUTPATIENT
Start: 2021-08-23 | End: 2021-08-23 | Stop reason: HOSPADM

## 2021-08-22 RX ADMIN — DEXAMETHASONE SODIUM PHOSPHATE 4 MG: 4 INJECTION, SOLUTION INTRAMUSCULAR; INTRAVENOUS at 08:16

## 2021-08-22 RX ADMIN — OXYCODONE AND ACETAMINOPHEN 2 TABLET: 5; 325 TABLET ORAL at 19:42

## 2021-08-22 RX ADMIN — OXYCODONE AND ACETAMINOPHEN 2 TABLET: 5; 325 TABLET ORAL at 04:15

## 2021-08-22 RX ADMIN — OXYCODONE AND ACETAMINOPHEN 2 TABLET: 5; 325 TABLET ORAL at 12:14

## 2021-08-22 RX ADMIN — SODIUM CHLORIDE, PRESERVATIVE FREE 10 ML: 5 INJECTION INTRAVENOUS at 08:16

## 2021-08-22 RX ADMIN — FAMOTIDINE 20 MG: 20 TABLET, FILM COATED ORAL at 17:26

## 2021-08-22 RX ADMIN — DEXAMETHASONE SODIUM PHOSPHATE 4 MG: 4 INJECTION, SOLUTION INTRAMUSCULAR; INTRAVENOUS at 03:18

## 2021-08-22 RX ADMIN — GABAPENTIN 400 MG: 400 CAPSULE ORAL at 13:24

## 2021-08-22 RX ADMIN — SODIUM CHLORIDE, PRESERVATIVE FREE 10 ML: 5 INJECTION INTRAVENOUS at 22:16

## 2021-08-22 RX ADMIN — METHYLPREDNISOLONE 24 MG: 4 TABLET ORAL at 17:25

## 2021-08-22 RX ADMIN — GABAPENTIN 400 MG: 400 CAPSULE ORAL at 05:55

## 2021-08-22 RX ADMIN — FAMOTIDINE 20 MG: 10 INJECTION INTRAVENOUS at 08:16

## 2021-08-22 RX ADMIN — GABAPENTIN 400 MG: 400 CAPSULE ORAL at 22:16

## 2021-08-22 RX ADMIN — OXYCODONE AND ACETAMINOPHEN 2 TABLET: 5; 325 TABLET ORAL at 23:50

## 2021-08-22 NOTE — PROGRESS NOTES
Neurosurgery progress note    Subjective: No events reported overnight.  Continues to complain of some left shoulder pain.  Strength has significantly improved.    Objective:  Vitals:    08/21/21 2335 08/22/21 0442 08/22/21 0936 08/22/21 1328   BP: 143/86 134/94 138/95 131/85   BP Location: Right arm Right arm Right arm Right arm   Patient Position: Lying Lying Lying Lying   Pulse: 85 59 75 76   Resp: 18 18 18 18   Temp:  98.3 °F (36.8 °C) 97.6 °F (36.4 °C) 96.8 °F (36 °C)   TempSrc:  Oral Oral Oral   SpO2: 95% 91%  94%   Weight:       Height:           Physical exam  Awake, alert, oriented x3  Extraocular muscles intact  Face symmetric  Speech is fluent and clear  No pronator drift  Motor exam  Bilateral deltoids 5/5, bilateral biceps 5/5, bilateral triceps 5/5, bilateral wrist extension 5/5 bilateral hand  5/5  Bilateral hip flexion 5/5, bilateral knee extension 5/5, bilateral DF/PF 5/5  No clonus  No Ava's reflex  Gait deffered  Able to detect  light touch in all 4 extremities  Incision c/d/i    A/P: 36yr m POD2 s/p C5-6 ACDF  -He appears to be recovering very well from the procedure.  His left arm strength has returned to normal.  He still indicates some left shoulder pain.  I believe the shoulder pain will resolve with time.  -No difficulty swallowing food  -I have offered to discharge him home today however he is requesting to stay 1 additional day

## 2021-08-23 ENCOUNTER — READMISSION MANAGEMENT (OUTPATIENT)
Dept: CALL CENTER | Facility: HOSPITAL | Age: 36
End: 2021-08-23

## 2021-08-23 ENCOUNTER — TELEPHONE (OUTPATIENT)
Dept: NEUROSURGERY | Facility: CLINIC | Age: 36
End: 2021-08-23

## 2021-08-23 VITALS
BODY MASS INDEX: 23.07 KG/M2 | TEMPERATURE: 98.6 F | SYSTOLIC BLOOD PRESSURE: 134 MMHG | HEART RATE: 60 BPM | OXYGEN SATURATION: 95 % | HEIGHT: 67 IN | RESPIRATION RATE: 18 BRPM | DIASTOLIC BLOOD PRESSURE: 90 MMHG | WEIGHT: 147 LBS

## 2021-08-23 PROCEDURE — 63710000001 METHYLPREDNISOLONE 4 MG TABLET THERAPY PACK 21 EACH DISP PACK: Performed by: NURSE PRACTITIONER

## 2021-08-23 RX ORDER — OXYCODONE HYDROCHLORIDE AND ACETAMINOPHEN 5; 325 MG/1; MG/1
2 TABLET ORAL EVERY 6 HOURS PRN
Qty: 40 TABLET | Refills: 0 | Status: SHIPPED | OUTPATIENT
Start: 2021-08-23 | End: 2021-08-25 | Stop reason: SDUPTHER

## 2021-08-23 RX ORDER — METHYLPREDNISOLONE 4 MG/1
TABLET ORAL
Qty: 21 TABLET | Refills: 0
Start: 2021-08-23

## 2021-08-23 RX ORDER — GABAPENTIN 400 MG/1
400 CAPSULE ORAL EVERY 8 HOURS SCHEDULED
Qty: 90 CAPSULE | Refills: 0 | Status: SHIPPED | OUTPATIENT
Start: 2021-08-23

## 2021-08-23 RX ORDER — METHOCARBAMOL 750 MG/1
750 TABLET, FILM COATED ORAL EVERY 6 HOURS PRN
Qty: 30 TABLET | Refills: 0 | Status: SHIPPED | OUTPATIENT
Start: 2021-08-23 | End: 2021-09-03

## 2021-08-23 RX ADMIN — GABAPENTIN 400 MG: 400 CAPSULE ORAL at 06:00

## 2021-08-23 RX ADMIN — OXYCODONE AND ACETAMINOPHEN 2 TABLET: 5; 325 TABLET ORAL at 11:09

## 2021-08-23 RX ADMIN — METHYLPREDNISOLONE 4 MG: 4 TABLET ORAL at 08:59

## 2021-08-23 RX ADMIN — METHYLPREDNISOLONE 4 MG: 4 TABLET ORAL at 12:23

## 2021-08-23 RX ADMIN — METHOCARBAMOL TABLETS 750 MG: 500 TABLET, COATED ORAL at 11:13

## 2021-08-23 RX ADMIN — FAMOTIDINE 20 MG: 20 TABLET, FILM COATED ORAL at 06:00

## 2021-08-23 RX ADMIN — OXYCODONE AND ACETAMINOPHEN 2 TABLET: 5; 325 TABLET ORAL at 06:00

## 2021-08-23 NOTE — DISCHARGE INSTRUCTIONS
Dr. Robert Bynum  [address] [facility], [address]  [place] [address]  [phone]    Posterior Cervical Decompression or Laminoplasty    Post-Operative Instructions    1. You should have a post-operative visit scheduled with the Physician Assistant or Nurse Practitioner for 2 to 2 ½ weeks post-operatively. If you do not have one, call the office when you return home to schedule this appointment. You are to remain off work at least until the first visit.    2. No lifting overhead, although you may place your arms above your head. Walking is allowed and encouraged. There are no restrictions on sitting or climbing stairs, but refrain from overly strenuous activity. Please do not drive until the first visit, although you may be driven. In general, activity restrictions will be lessened following the first visit.    3. If your doctor has provided a cervical collar, please wear at all times, except while in the shower. You do not have a fusion so some movement is allowed in your neck. The collar is intended primarily for relief of neck discomfort.    4. Take your dressings off and leave them off after the first day at home. Keep the wound dry. IF you have staples/stiches clean your incision with peroxide three times daily. IF your incision is closed with glue, do not pick, scratch or rub the glue on the wound, so it does not loosen before the wound heals. Do not soak your wound in water until the glue film falls off. Avoid swimming or using a hot tub while the glue is in place. When you shower or bathe, let water run over the wound but do not rub. Pat the wound gently with a soft towel to dry. Avoid direct sunlight to the wound and do not use tanning beds or lamps with the glue film in place. Do not apply any cream, lotion, or ointment to the skin near the wound; it could loosen the glue before the wound heals. Do not apply any tape, sticky dressing, alcohol or Chloraprep to the glue site as these could loosen the  glue.    5. You will leave the hospital with medications for pain and possibly oral antibiotics if indicated. These medications must be taken as prescribed only. If a refill of your pain medication is required, in order to have this medication refilled you must contact the office four days prior to the due date.    6. If there are any problems such as excessive neck or arm pain, persistent temperature of 100 degrees or greater despite Tylenol, chills, excessive bloody discharge from the wound, redness and hot skin around the incision, clear or yellowish discharge from the wound, or severe headache, particularly when sitting or standing, please call the office. A small amount of bleeding from the incision during the first few days is not unusual.

## 2021-08-23 NOTE — DISCHARGE SUMMARY
Abdi Tyler  1985    Patient Care Team:  Epley, James, APRN as PCP - General (Family Medicine)    Date of Admit: 8/18/2021    Date of Discharge:  8/23/2021    Discharge Diagnosis:  Herniation of intervertebral disc at C5-C6 level      Procedures Performed  Procedure(s):  C5-6 ACDF       Complications:  None    Consultants:   Consults     Date and Time Order Name Status Description    8/18/2021 11:05 AM [facility] (on-call MD unless specified) Details Completed     8/18/2021 10:37 AM Neurosurgery (on-call MD unless specified) Completed     8/18/2021  8:41 AM Neurosurgery (on-call MD unless specified) Completed           Condition on Discharge: stable    Discharge disposition: home    Pertinent Test Results: None    Brief HPI: Patient evaluated in office for complaints of weeklong history of moderate to severe neck pain that radiated into the left shoulder, arm and hand with numbness in his left thumb as well as abnormal sensation throughout his left arm. Imaging revealed degenerative changes at C5-6 and C8 6-7 with a large disc herniation at C5-6 which is eccentric to the left causing some cord deformity. RBAs of treatment were discussed including the above procedure. Patient consented to above procedure.    Hospital Course: Patient admitted for above procedure. The procedure itself was without complication. The patient was transferred to [facility] following recovery.  He has continued to do well, states that he continues to have only 10% of numbness in his left thumb compared to about 30% on arrival, states that he does have some decrease in numbness in the rest of his left arm, as well, but continues to have left arm pain that is some better.  He states that he may have overdone it yesterday, which has increased his pain some today.  He will go home with gabapentin, Medrol Dosepak, Robaxin, and oxycodone.    Discharge Physical Exam:    Temp:  [96.8 °F (36 °C)-98.6 °F (37 °C)] 98.6 °F (37 °C)  Heart Rate:  [60-76]  60  Resp:  [18] 18  BP: (130-141)/(85-96) 134/90    Current labs:  Lab Results (last 24 hours)     ** No results found for the last 24 hours. **            General Appearance No acute distress   HEENT NC/AT;    Neurological Awake, Alert, and oriented x 3   Cranial nerves CN II-XII intact   Motor Strength equal, tone normal   Sensory Intact to light touch on the right, decreased sensation on the left thumb and arm   Gait and station Ambulating    Neck Supple, trachea midline, no carotid bruit, incision is C/D/I, Dermabond intact, mild swelling at the incision site, no edema, no erythema, no drainage   Extremities Moves all extremities well, no edema, no cyanosis, no redness         Discharge Medications  Joe has been reviewed and narcotic consent is on file in the patient's chart.     Your medication list      START taking these medications      Instructions Last Dose Given Next Dose Due   gabapentin 400 MG capsule  Commonly known as: NEURONTIN      Take 1 capsule by mouth Every 8 (Eight) Hours.       methocarbamol 750 MG tablet  Commonly known as: ROBAXIN      Take 1 tablet by mouth Every 6 (Six) Hours As Needed for Muscle Spasms (neck pain).       methylPREDNISolone 4 MG dose pack  Commonly known as: MEDROL      Take as directed on package instructions.  Has package at bedside.  DO NOT FILL       oxyCODONE-acetaminophen 5-325 MG per tablet  Commonly known as: Percocet      Take 2 tablets by mouth Every 6 (Six) Hours As Needed for Severe Pain  for up to 10 days.             Where to Get Your Medications      These medications were sent to [facility] [place] [address]    Hours: 7:00 AM-6:00 PM Mon-Fri, 8:00 AM-4:30 PM Sat-Sun (Closed 12-12:30PM) Phone: [phone]   · gabapentin 400 MG capsule  · methocarbamol 750 MG tablet  · oxyCODONE-acetaminophen 5-325 MG per tablet     Information about where to get these medications is not yet available    Ask your nurse or doctor  "about these medications  · methylPREDNISolone 4 MG dose pack         Discharge Diet:     Diet Order   Procedures   • Diet Regular       Activity at Discharge: Can be up walking, no lifting over 5 pounds, no bending over.  Wear brace, when out of bed.       Call for: questions or concerns    Follow-up Appointments  No future appointments.  Follow-up Information     Epley, James, APRN .    Specialties: Nurse Practitioner, Family Medicine  Contact information:  [address]  [facility] [address]  [facility] [address] [phone]                   Test Results Pending at Discharge     None    I discussed the discharge instructions with patient, nursing staff and Dr. Misa Feldman, APRN  08/23/21  11:42 EDT      \"Dictated utilizing Dragon dictation\".        "

## 2021-08-23 NOTE — OUTREACH NOTE
Prep Survey      Responses   [facility] facility patient discharged from?  [facility]   Is LACE score < 7 ?  No   Emergency Room discharge w/ pulse ox?  No   Eligibility  [facility]   Date of Admission  08/18/21   Date of Discharge  08/23/21   Discharge Disposition  Home or Self Care   Discharge diagnosis  anterior cervical discectomy & fusion C5-6   Does the patient have one of the following disease processes/diagnoses(primary or secondary)?  General Surgery   Does the patient have Home health ordered?  No   Is there a DME ordered?  No   Prep survey completed?  Yes          Manju Resendiz RN

## 2021-08-23 NOTE — TELEPHONE ENCOUNTER
----- Message from IRENA Traore sent at 8/23/2021 11:40 AM EDT -----  Regarding: f/up  Will need a follow-up appointment in approximately 2 weeks related to anterior cervical discectomy.  Thank you

## 2021-08-23 NOTE — PROGRESS NOTES
Case Management Discharge Note      Final Note: Pt discharging home, no needs identified    Provided Post Acute Provider List?: Refused  Refused Provider List Comment: Denied need  Provided Post Acute Provider Quality & Resource List?: Refused  Refused Quality and Resource List Comment: Denied need    Selected Continued Care - Admitted Since 8/18/2021     Destination    No services have been selected for the patient.              Durable Medical Equipment    No services have been selected for the patient.              Dialysis/Infusion    No services have been selected for the patient.              Home Medical Care    No services have been selected for the patient.              Therapy    No services have been selected for the patient.              Community Resources    No services have been selected for the patient.              Community & DME    No services have been selected for the patient.                  Transportation Services  Private: Car    Final Discharge Disposition Code: 01 - home or self-care

## 2021-08-23 NOTE — PLAN OF CARE
-End of shift summary-    A&O: x4  O2: RA  Heart Rhythm: SR  Pain: Controlled with PO percocet  Diet: Tolerates  Voiding: Voids  BM: Last 8/17 prior to admission- reports +flatus. No bowel regimen ordered  Ambulation: Ambulates well in hallways  Skin: Incision WNL  Event: No new event  POC: Pain control, possible discharge home today

## 2021-08-24 ENCOUNTER — TRANSITIONAL CARE MANAGEMENT TELEPHONE ENCOUNTER (OUTPATIENT)
Dept: CALL CENTER | Facility: HOSPITAL | Age: 36
End: 2021-08-24

## 2021-08-24 NOTE — OUTREACH NOTE
Call Center TCM Note      Responses   [facility] patient discharged from?  [facility]   Does the patient have one of the following disease processes/diagnoses(primary or secondary)?  General Surgery   TCM attempt successful?  Yes   Call start time  1442   Call end time  1447   Discharge diagnosis  anterior cervical discectomy & fusion C5-6   Meds reviewed with patient/caregiver?  Yes   Is the patient having any side effects they believe may be caused by any medication additions or changes?  No   Does the patient have all medications related to this admission filled (includes all antibiotics, pain medications, etc.)  Yes   Prescription comments  Patient is going to call and get it straightened out b/c the pharmacy did not get pain med correct    Is the patient taking all medications as directed (includes completed medication regime)?  Yes   Does the patient have a follow up appointment scheduled with their surgeon?  Yes [9/3/21 surgeon fu apt]   Has the patient kept scheduled appointments due by today?  N/A   Comments  will route a message to group re: hosp dc fu apt (no availability with James Epley)    Psychosocial issues?  No   Did the patient receive a copy of their discharge instructions?  Yes   Nursing interventions  Reviewed instructions with patient   What is the patient's perception of their health status since discharge?  Improving   Nursing interventions  Nurse provided patient education   Is the patient /caregiver able to teach back basic post-op care?  Take showers only when approved by MD-sponge bathe until then, Practice 'cough and deep breath'   Is the patient/caregiver able to teach back signs and symptoms of incisional infection?  Increased redness, swelling or pain at the incisonal site, Increased drainage or bleeding, Pus or odor from incision   Is the patient/caregiver able to teach back steps to recovery at home?  Set small, achievable goals for return to baseline health   If the patient  is a current smoker, are they able to teach back resources for cessation?  Not a smoker   Is the patient/caregiver able to teach back the hierarchy of who to call/visit for symptoms/problems? PCP, Specialist, Home health nurse, Urgent Care, ED, 911  Yes   TCM call completed?  Yes          Jennifer Wesley RN    8/24/2021, 14:47 EDT

## 2021-08-25 ENCOUNTER — TELEPHONE (OUTPATIENT)
Dept: NEUROSURGERY | Facility: CLINIC | Age: 36
End: 2021-08-25

## 2021-08-25 DIAGNOSIS — M50.222 HERNIATION OF INTERVERTEBRAL DISC AT C5-C6 LEVEL: Primary | ICD-10-CM

## 2021-08-25 RX ORDER — OXYCODONE HYDROCHLORIDE AND ACETAMINOPHEN 5; 325 MG/1; MG/1
1 TABLET ORAL EVERY 6 HOURS PRN
Qty: 40 TABLET | Refills: 0 | Status: SHIPPED | OUTPATIENT
Start: 2021-08-25 | End: 2021-09-04

## 2021-08-25 NOTE — TELEPHONE ENCOUNTER
Pt is taking 2 Percocet Q6h for pain. He was discharged from the hospital 8/23/21 with #40 Percocet. He said he was told to call back in two days after being discharged because the prescription is a 5 day supply and not the 10 day supply he was told he was getting. He said he has tried to space it out to every 7 hours and he has even tried to just take 1 but pain is so bad he had to take the second one.

## 2021-08-25 NOTE — TELEPHONE ENCOUNTER
Pt called stating that he is needing the other 5 day supply of his Oxycodone refilled. Pt states that he only received a 5 day supply verses a 10 day supply, but was told to call the office 3 days before he runs out. Please call and advise

## 2021-08-27 NOTE — TELEPHONE ENCOUNTER
Patient called regarding his pain medication that he called about a couple days ago. The patient stated he will be out of his pain medicine at 12 pm tomorrow. Please advise.

## 2021-09-02 ENCOUNTER — READMISSION MANAGEMENT (OUTPATIENT)
Dept: CALL CENTER | Facility: HOSPITAL | Age: 36
End: 2021-09-02

## 2021-09-02 NOTE — OUTREACH NOTE
COVID-19 Week 2 Survey      Responses   [facility] patient discharged from?  [place]   Does the patient have one of the following disease processes/diagnoses(primary or secondary)?  General Surgery   Call start time  1508   Call end time  1511   Discharge diagnosis  anterior cervical discectomy & fusion C5-6   Meds reviewed with patient/caregiver?  Yes   Is the patient having any side effects they believe may be caused by any medication additions or changes?  No   Is the patient taking all medications as directed (includes completed medication regime)?  Yes   Has the patient kept scheduled appointments due by today?  N/A   Comments  APPOINTMENT WITH THE SURGEON IS TOMORROW   Has home health visited the patient within 72 hours of discharge?  N/A   Psychosocial issues?  No   Did the patient receive a copy of their discharge instructions?  Yes   Nursing interventions  Reviewed instructions with patient   What is the patient's perception of their health status since discharge?  Improving   If the patient is a current smoker, are they able to teach back resources for cessation?  Not a smoker   Is the patient/caregiver able to teach back the hierarchy of who to call/visit for symptoms/problems? PCP, Specialist, Home health nurse, Urgent Care, ED, 911  Yes   Is the patient /caregiver able to teach back basic post-op care?  Take showers only when approved by MD-sponge bathe until then, Drive as instructed by MD in discharge instructions, Lifting as instructed by MD in discharge instructions, Keep incision areas clean,dry and protected   Is the patient/caregiver able to teach back signs and symptoms of incisional infection?  Increased redness, swelling or pain at the incisonal site, Fever, Increased drainage or bleeding, Incisional warmth, Pus or odor from incision          Brandy Jaimes LPN

## 2021-09-03 ENCOUNTER — OFFICE VISIT (OUTPATIENT)
Dept: NEUROSURGERY | Facility: CLINIC | Age: 36
End: 2021-09-03

## 2021-09-03 ENCOUNTER — TELEPHONE (OUTPATIENT)
Dept: NEUROSURGERY | Facility: CLINIC | Age: 36
End: 2021-09-03

## 2021-09-03 VITALS
DIASTOLIC BLOOD PRESSURE: 80 MMHG | WEIGHT: 147 LBS | BODY MASS INDEX: 23.07 KG/M2 | HEART RATE: 89 BPM | TEMPERATURE: 98 F | HEIGHT: 67 IN | SYSTOLIC BLOOD PRESSURE: 139 MMHG

## 2021-09-03 DIAGNOSIS — M50.222 HERNIATION OF INTERVERTEBRAL DISC AT C5-C6 LEVEL: ICD-10-CM

## 2021-09-03 PROCEDURE — 99024 POSTOP FOLLOW-UP VISIT: CPT | Performed by: NURSE PRACTITIONER

## 2021-09-03 RX ORDER — ACETAMINOPHEN 500 MG
500 TABLET ORAL
COMMUNITY

## 2021-09-03 RX ORDER — IBUPROFEN 400 MG/1
400 TABLET ORAL
COMMUNITY

## 2021-09-03 RX ORDER — METHOCARBAMOL 750 MG/1
750 TABLET, FILM COATED ORAL EVERY 6 HOURS PRN
Qty: 30 TABLET | Refills: 1 | Status: SHIPPED | OUTPATIENT
Start: 2021-09-03

## 2021-09-03 NOTE — TELEPHONE ENCOUNTER
LM for patient to see if he could come in today at 2:00 instead of 1:00 because Dr. Bynum has been called into an emergent surgery and our APRN will see him instead.

## 2021-09-03 NOTE — TELEPHONE ENCOUNTER
Caller: Abdi Tyler    Relationship: Self    Best call back number: [phone]    What was the call regarding:   TRANSFERRED CALL TO [facility].

## 2021-09-08 ENCOUNTER — TELEPHONE (OUTPATIENT)
Dept: NEUROSURGERY | Facility: CLINIC | Age: 36
End: 2021-09-08

## 2021-09-08 DIAGNOSIS — M50.222 HERNIATION OF INTERVERTEBRAL DISC AT C5-C6 LEVEL: Primary | ICD-10-CM

## 2021-09-08 RX ORDER — OXYCODONE HYDROCHLORIDE AND ACETAMINOPHEN 5; 325 MG/1; MG/1
1 TABLET ORAL EVERY 6 HOURS PRN
Qty: 30 TABLET | Refills: 0 | Status: SHIPPED | OUTPATIENT
Start: 2021-09-08

## 2021-09-08 NOTE — TELEPHONE ENCOUNTER
Pt has been advised that this will be his last prescription of Percocet. He said he is feeling a little better after taking the methocarbamol. Per Dr. Bynum he would like him to have an cervical XR and follow up with him next visit. Pt will also use bone growth stimulator. He said he still smokes but he is gradually decreasing the amount of cigarettes.

## 2021-09-08 NOTE — TELEPHONE ENCOUNTER
Patient called requesting a refill on oxycodone 5-325.  He took his last one this morning.  He is also requesting an NSAID for his shoulder.  He said that his shoulder is really bothering him.  Please advise.  Patient uses the [facility] off [facility].

## 2021-09-08 NOTE — TELEPHONE ENCOUNTER
There was a separate telephone encounter ongoing with mine and the patient's pain medication has been addressed by Dr. Bynum.

## 2021-09-08 NOTE — TELEPHONE ENCOUNTER
Pt said she is taking 1 Oxycodone 5/325 in the morning and 2 at night for L upper arm pain no neck pain. He started Methocarbamol yesterday he has had 2 so far with no relief. I did let him know it may take sometime before he can tell a difference in arm pain taking the methocarbamol.

## 2021-09-08 NOTE — TELEPHONE ENCOUNTER
Followed up with Mr. Tyler today after speaking with Dr. Bynum. Advised him that he could go back to work as of 9/29 with a 10 lb per arm restriction.     Patient reports that he is no longer smoking as he quit on 8/18 but I did advise him of the increased risk of fusion failure and the likelihood that he would need another surgery if he were to restart. He verbalized understanding.      He reports that he is currently taking 1 Percocet in the morning and 1 Percocet at bedtime. He states that he will need the Percocet until he can resume his NSAIDs. Advised the patient that I would given him 5 more days worth of Percocet but that he needed to wean himself off of these and use Tylenol to help with pain relief. Advised him that he needed to wait 6 weeks before restarting NSAIDs. He verbalized understanding.     Patient reports that he never received his bone growth stimulator. Advised him that I would look into this and follow up with him once I had an answer as to why he didn't get it.

## 2021-09-09 ENCOUNTER — OFFICE VISIT (OUTPATIENT)
Dept: FAMILY MEDICINE CLINIC | Facility: CLINIC | Age: 36
End: 2021-09-09

## 2021-09-09 VITALS
HEIGHT: 67 IN | RESPIRATION RATE: 16 BRPM | HEART RATE: 63 BPM | DIASTOLIC BLOOD PRESSURE: 92 MMHG | WEIGHT: 160.1 LBS | SYSTOLIC BLOOD PRESSURE: 134 MMHG | OXYGEN SATURATION: 99 % | BODY MASS INDEX: 25.13 KG/M2 | TEMPERATURE: 98.2 F

## 2021-09-09 DIAGNOSIS — R79.89 ABNORMAL CBC: ICD-10-CM

## 2021-09-09 DIAGNOSIS — M50.222 HERNIATION OF INTERVERTEBRAL DISC AT C5-C6 LEVEL: Primary | ICD-10-CM

## 2021-09-09 DIAGNOSIS — R73.9 HYPERGLYCEMIA: ICD-10-CM

## 2021-09-09 PROCEDURE — 99214 OFFICE O/P EST MOD 30 MIN: CPT | Performed by: NURSE PRACTITIONER

## 2021-09-09 RX ORDER — OXYCODONE HYDROCHLORIDE AND ACETAMINOPHEN 5; 325 MG/1; MG/1
1 TABLET ORAL EVERY 6 HOURS PRN
Qty: 30 TABLET | Refills: 0 | Status: CANCELLED | OUTPATIENT
Start: 2021-09-09

## 2021-09-09 NOTE — PROGRESS NOTES
"Chief Complaint  Hospital Follow Up Visit (C-5 REMOVAL)    Subjective          Abdi Tyler presents to [facility]  Pleasant gentleman here today follow-up recent surgery approximately 2 weeks ago had cervical fusion related to a pretty large disc herniation.  He has had numbness and paresthesias down his left arm and severe pain.  He is improving after his surgery still has some paresthesias and actually a little bit more paresthesia down his left arm increase at night, he is presently down to 3 Percocet a day 2 at night 1 during the day and is decreasing this with his neurosurgeon  He is taking gabapentin as well for her milligrams 3 times a day is not for sure if this is helping he is taking some Tylenol he is avoiding anti-inflammatories  He quit smoking except he had a few several days ago but he is back to no tobacco he is chewing gum presently he is really following instructions of his surgeon  He has been vaccinated for Covid already he has no fever no chest pain shortness of breath gait difficulties or other problems generally has been healthy    Recent CBC mild leukocytosis sugar is 160s it is not fasting he was on some steroids at this time and I think this is likely related to steroid we will repeat these and discussed with patient        Objective   Vital Signs:   /92   Pulse 63   Temp 98.2 °F (36.8 °C) (Infrared)   Resp 16   Ht 170.2 cm (67\")   Wt 72.6 kg (160 lb 1.6 oz)   SpO2 99%   BMI 25.08 kg/m²     Physical Exam  Vitals reviewed.   Constitutional:       Appearance: Normal appearance. He is well-developed. He is not ill-appearing or diaphoretic.   HENT:      Head: Normocephalic.      Nose: Nose normal.   Eyes:      General: No scleral icterus.     Conjunctiva/sclera: Conjunctivae normal.      Pupils: Pupils are equal, round, and reactive to light.   Neck:      Thyroid: No thyromegaly.      Vascular: No JVD.   Cardiovascular:      Rate and Rhythm: " Normal rate and regular rhythm.      Heart sounds: Normal heart sounds. No murmur heard.   No friction rub. No gallop.    Pulmonary:      Effort: Pulmonary effort is normal. No respiratory distress.      Breath sounds: Normal breath sounds. No stridor. No wheezing or rales.   Abdominal:      General: Bowel sounds are normal. There is no distension.      Palpations: Abdomen is soft.      Tenderness: There is no abdominal tenderness.      Comments: No hepatosplenomegaly, no ascites,   Musculoskeletal:         General: No tenderness.      Cervical back: Neck supple.      Comments: Deferred range of motion neck exam in upper extremity but he has no swelling or tenderness of his left upper extremity   Lymphadenopathy:      Cervical: No cervical adenopathy.   Skin:     General: Skin is warm and dry.      Findings: No erythema or rash.   Neurological:      Mental Status: He is alert and oriented to person, place, and time.      Deep Tendon Reflexes: Reflexes are normal and symmetric.   Psychiatric:         Behavior: Behavior normal.         Thought Content: Thought content normal.         Judgment: Judgment normal.        Result Review :                 Assessment and Plan    Diagnoses and all orders for this visit:    1. Herniation of intervertebral disc at C5-C6 level (Primary)  -     Lipid Panel With LDL / HDL Ratio; Future  -     CBC & Differential; Future  -     Glucose, Plasma ([facility]); Future    2. Abnormal CBC  -     Lipid Panel With LDL / HDL Ratio; Future  -     CBC & Differential; Future  -     Glucose, Plasma ([facility]); Future    3. Hyperglycemia  -     Lipid Panel With LDL / HDL Ratio; Future  -     CBC & Differential; Future  -     Glucose, Plasma ([facility]); Future    Other orders  -     Cancel: oxyCODONE-acetaminophen (Percocet) 5-325 MG per tablet; Take 1 tablet by mouth Every 6 (Six) Hours As Needed for Severe Pain .  Dispense: 30 tablet; Refill: 0        Follow Up   No follow-ups on file.  Patient was  given instructions and counseling regarding his condition or for health maintenance advice. Please see specific information pulled into the AVS if appropriate.     Plan to repeat patient's labs  Avoid all tobacco  Yearly physical  Reinforces plan  Continue to wean off of any narcotic  Should he develop Covid reach out to me immediately  Office visit 36 minutes

## 2021-09-09 NOTE — PATIENT INSTRUCTIONS
Discharge instructions continue present care  Avoid all nicotine you are doing spectacular continue this  Follow-up neurosurgery    Otherwise push fluids plenty of rest  Regular exercise when cleared  Maintain a healthy diet  Yearly complete physical exam  Outpatient fasting labs about 6 weeks to repeat a CBC check your cholesterol and a fasting glucose    Colonoscopy screening age 45  Prostate cancer screening between 45 and 50    Vitamin D3 OTC 1000 international units

## 2021-09-21 ENCOUNTER — TELEPHONE (OUTPATIENT)
Dept: NEUROSURGERY | Facility: CLINIC | Age: 36
End: 2021-09-21

## 2021-09-21 NOTE — TELEPHONE ENCOUNTER
[employer] called to say the bone growth stimulator has been denied. They no longer cover them for cervical fusion.  [phone] to appeal

## 2021-09-27 ENCOUNTER — TELEPHONE (OUTPATIENT)
Dept: NEUROSURGERY | Facility: CLINIC | Age: 36
End: 2021-09-27

## 2021-09-27 NOTE — TELEPHONE ENCOUNTER
Caller: Abdi Tyler    Relationship: Self    Best call back number: [phone]    What form or medical record are you requesting RTW     Who is requesting this form or medical record from you:EMPLOYER    How would you like to receive the form or medical records (pick-up, mail, fax): FAX    If pick-up, provide patient with address and location details    Timeframe paperwork needed: ASAP    Additional notes: PT IS CALLING TO GET A RTW NOTE FAXED TO HIS EMPLOYER. HE DID NOT HAVE THE FAX NUMBER, STATED THAT HE WILL CALL RIGHT BACK WITH THAT INFORMATION.     THANK YOU

## 2021-09-27 NOTE — TELEPHONE ENCOUNTER
PLEASE FAX RTW NOTE TO [employer]  @ [phone].  TODAY IF POSSIBLE.  HIS BENEFITS ARE RUNNING OUT TOMORROW.  HE NEEDS TO TELL HIS MANAGER A DATE.  ANY QUESTIONS PLEASE CALL PATIENT [phone]    HE ALSO HAS QUESTIONS ABOUT THE AMOUNT OF ADVIL (1200MG, 3 MORNING, AND 3 NIGHT) HE IS TAKING.  IS THIS AMOUNT OKAY.  IT HELPS A LITTLE BUT DOES NOT TAKE THE PAIN AWAY.

## 2021-09-30 ENCOUNTER — TELEPHONE (OUTPATIENT)
Dept: NEUROSURGERY | Facility: CLINIC | Age: 36
End: 2021-09-30

## 2021-10-01 NOTE — TELEPHONE ENCOUNTER
Pt instructed to d/c Ibuprofen and take Tylenol.  Has been R/S to 10/7/21 and get xray cervical same day per NT.

## 2021-10-07 ENCOUNTER — OFFICE VISIT (OUTPATIENT)
Dept: NEUROSURGERY | Facility: CLINIC | Age: 36
End: 2021-10-07

## 2021-10-07 ENCOUNTER — HOSPITAL ENCOUNTER (OUTPATIENT)
Dept: GENERAL RADIOLOGY | Facility: HOSPITAL | Age: 36
Discharge: HOME | End: 2021-10-07
Admitting: NEUROLOGICAL SURGERY

## 2021-10-07 VITALS
BODY MASS INDEX: 25.83 KG/M2 | SYSTOLIC BLOOD PRESSURE: 117 MMHG | DIASTOLIC BLOOD PRESSURE: 72 MMHG | TEMPERATURE: 99.3 F | HEIGHT: 67 IN | HEART RATE: 72 BPM | WEIGHT: 164.6 LBS

## 2021-10-07 DIAGNOSIS — M50.222 HERNIATION OF INTERVERTEBRAL DISC AT C5-C6 LEVEL: ICD-10-CM

## 2021-10-07 DIAGNOSIS — M50.222 HERNIATION OF INTERVERTEBRAL DISC AT C5-C6 LEVEL: Primary | ICD-10-CM

## 2021-10-07 PROCEDURE — 72040 X-RAY EXAM NECK SPINE 2-3 VW: CPT

## 2021-10-07 PROCEDURE — 99024 POSTOP FOLLOW-UP VISIT: CPT | Performed by: NEUROLOGICAL SURGERY

## 2021-10-07 RX ORDER — GABAPENTIN 300 MG/1
300 CAPSULE ORAL 3 TIMES DAILY
Qty: 90 CAPSULE | Refills: 1 | Status: SHIPPED | OUTPATIENT
Start: 2021-10-07

## 2021-10-07 NOTE — PROGRESS NOTES
Subjective   History of Present Illness: Abdi Tyler is a 36 y.o. male is here today for follow-up with new XR. He is 7 weeks out from a C5-C6 ACDF. He has returned to work full time.  He denies any neck pain today.  He reports that he still has some residual left arm pain.  He reports most of the pain is in his left shoulder and sometimes radiates to his elbow.  He previously had weakness in his left deltoid, bicep, tricep, wrist extension, hand  which has all resolved.  He has no abnormal sensation.  He denies any weakness.  Denies any difficulty walking.    History of Present Illness    The following portions of the patient's history were reviewed and updated as appropriate: allergies, past family history, past medical history, past social history, past surgical history and problem list.    History reviewed. No pertinent past medical history.     Past Surgical History:   Procedure Laterality Date   • ANTERIOR CERVICAL DISCECTOMY W/ FUSION N/A 8/20/2021    Procedure: C5-6 ACDF;  Surgeon: Robert Bynum MD;  Location: [facility];  Service: Neurosurgery;  Laterality: N/A;   • WISDOM TOOTH EXTRACTION            Current Outpatient Medications:   •  acetaminophen (TYLENOL) 500 MG tablet, Take 500 mg by mouth., Disp: , Rfl:   •  gabapentin (NEURONTIN) 300 MG capsule, Take 1 capsule by mouth 3 (Three) Times a Day., Disp: 90 capsule, Rfl: 1  •  gabapentin (NEURONTIN) 400 MG capsule, Take 1 capsule by mouth Every 8 (Eight) Hours., Disp: 90 capsule, Rfl: 0  •  ibuprofen (ADVIL,MOTRIN) 400 MG tablet, Take 400 mg by mouth., Disp: , Rfl:   •  methocarbamol (ROBAXIN) 750 MG tablet, Take 1 tablet by mouth Every 6 (Six) Hours As Needed for Muscle Spasms (neck pain)., Disp: 30 tablet, Rfl: 1  •  methylPREDNISolone (MEDROL) 4 MG dose pack, Take as directed on package instructions.  Has package at bedside.  DO NOT FILL, Disp: 21 tablet, Rfl: 0  •  oxyCODONE-acetaminophen (Percocet) 5-325 MG per tablet, Take 1 tablet  "by mouth Every 6 (Six) Hours As Needed for Severe Pain ., Disp: 30 tablet, Rfl: 0     No Known Allergies     Social History     Socioeconomic History   • Marital status:      Spouse name: Not on file   • Number of children: Not on file   • Years of education: Not on file   • Highest education level: Not on file   Tobacco Use   • Smoking status: Light Tobacco Smoker     Packs/day: 0.25     Types: Cigarettes   • Smokeless tobacco: Never Used   Vaping Use   • Vaping Use: Some days   • Substances: Nicotine, Flavoring   • Devices: Pre-filled or refillable cartridge   Substance and Sexual Activity   • Alcohol use: No   • Drug use: No   • Sexual activity: Yes        Family History   Problem Relation Age of Onset   • Diabetes Mother    • Hypertension Mother         Review of Systems    Objective     Vitals:    10/07/21 1252   BP: 117/72   Pulse: 72   Temp: 99.3 °F (37.4 °C)   Weight: 74.7 kg (164 lb 9.6 oz)   Height: 170.2 cm (67\")     Body mass index is 25.78 kg/m².      Physical Exam  Neurologic Exam  Awake, alert, oriented x3  Face symmetric  Speech is fluent and clear  No pronator drift  Motor exam  Bilateral deltoids 5/5, bilateral biceps 5/5, bilateral triceps 5/5, bilateral wrist extension 5/5 bilateral hand  5/5  Bilateral hip flexion 5/5, bilateral knee extension 5/5, bilateral DF/PF 5/5  No clonus  No Ava's reflex  Steady normal gait  Able to walk heel-to-toe without difficulty  Able to detect  light touch in all 4 extremities  Incision appears clean dry and intact    Assessment/Plan   Independent Review of Radiographic Studies:      I personally reviewed the images from the following studies.  X-ray of the cervical spine from 2021  The x-ray images show expected postoperative changes from the C5-6 ACDF.  There is no evidence of hardware failure    Medical Decision Makin-year-old male s/p C5-6 ACDF on 2021  -He is recovering very well from his ACDF procedure.  His left arm " weakness has completely resolved.  He denies any neck pain today.  He reports he still has pain rating down his left shoulder into his upper arm.  He reports that he has been taking extra strength Tylenol 1500 mg 3 times a day.  I have asked him to take less Tylenol to avoid any injury to his liver.  I have asked him to try to limit the Tylenol to less than 3 g/day.  I have also stressed the importance of smoking cessation  -Of asked him to take 300 mg 3 times a day of gabapentin to see if this helps with some of his arm pain until he is completely recovered from the procedure.  I will plan to see him back in 6 weeks to evaluate for any further improvement  Diagnoses and all orders for this visit:    1. Herniation of intervertebral disc at C5-C6 level (Primary)  -     gabapentin (NEURONTIN) 300 MG capsule; Take 1 capsule by mouth 3 (Three) Times a Day.  Dispense: 90 capsule; Refill: 1      Return in about 6 weeks (around 11/18/2021).

## 2021-11-15 ENCOUNTER — TELEPHONE (OUTPATIENT)
Dept: NEUROSURGERY | Facility: CLINIC | Age: 36
End: 2021-11-15

## 2021-12-03 ENCOUNTER — OFFICE VISIT (OUTPATIENT)
Dept: NEUROSURGERY | Facility: CLINIC | Age: 36
End: 2021-12-03

## 2021-12-03 VITALS
SYSTOLIC BLOOD PRESSURE: 128 MMHG | TEMPERATURE: 99.8 F | HEIGHT: 67 IN | WEIGHT: 166.4 LBS | HEART RATE: 97 BPM | DIASTOLIC BLOOD PRESSURE: 84 MMHG | BODY MASS INDEX: 26.12 KG/M2

## 2021-12-03 DIAGNOSIS — M43.22 FUSION OF SPINE, CERVICAL REGION: Primary | ICD-10-CM

## 2021-12-03 PROCEDURE — 99213 OFFICE O/P EST LOW 20 MIN: CPT | Performed by: NEUROLOGICAL SURGERY

## 2022-03-17 NOTE — PROGRESS NOTES
Subjective   History of Present Illness: Abdi Tyler is a 37 y.o. male is here today for televisit follow-up.  He is 7 months out from a C5-C6 ACDF.  He is doing well.  He reports that his left shoulder weakness is resolving.  His neck and shoulder pain is also resolving.  He reports that when he does have pain it easily relieved with ibuprofen.  He still has some shoulder and neck pain with activity.  He has no new complaints today.    You have chosen to receive care through a telephone visit. Do you consent to use a telephone visit for your medical care today? Yes        History of Present Illness    The following portions of the patient's history were reviewed and updated as appropriate: allergies, past family history, past medical history, past social history, past surgical history and problem list.    History reviewed. No pertinent past medical history.     Past Surgical History:   Procedure Laterality Date   • ANTERIOR CERVICAL DISCECTOMY W/ FUSION N/A 8/20/2021    Procedure: C5-6 ACDF;  Surgeon: Robert Bynum MD;  Location: [facility];  Service: Neurosurgery;  Laterality: N/A;   • WISDOM TOOTH EXTRACTION            Current Outpatient Medications:   •  ibuprofen (ADVIL,MOTRIN) 400 MG tablet, Take 400 mg by mouth., Disp: , Rfl:   •  acetaminophen (TYLENOL) 500 MG tablet, Take 500 mg by mouth., Disp: , Rfl:   •  gabapentin (NEURONTIN) 300 MG capsule, Take 1 capsule by mouth 3 (Three) Times a Day., Disp: 90 capsule, Rfl: 1  •  gabapentin (NEURONTIN) 400 MG capsule, Take 1 capsule by mouth Every 8 (Eight) Hours., Disp: 90 capsule, Rfl: 0  •  methocarbamol (ROBAXIN) 750 MG tablet, Take 1 tablet by mouth Every 6 (Six) Hours As Needed for Muscle Spasms (neck pain)., Disp: 30 tablet, Rfl: 1  •  methylPREDNISolone (MEDROL) 4 MG dose pack, Take as directed on package instructions.  Has package at bedside.  DO NOT FILL, Disp: 21 tablet, Rfl: 0  •  oxyCODONE-acetaminophen (Percocet) 5-325 MG per tablet, Take  1 tablet by mouth Every 6 (Six) Hours As Needed for Severe Pain ., Disp: 30 tablet, Rfl: 0     No Known Allergies     Social History     Socioeconomic History   • Marital status:    Tobacco Use   • Smoking status: Light Tobacco Smoker     Packs/day: 0.25     Types: Cigarettes   • Smokeless tobacco: Never Used   Vaping Use   • Vaping Use: Some days   • Substances: Nicotine, Flavoring   • Devices: Pre-filled or refillable cartridge   Substance and Sexual Activity   • Alcohol use: No   • Drug use: No   • Sexual activity: Yes        Family History   Problem Relation Age of Onset   • Diabetes Mother    • Hypertension Mother         Review of Systems   Musculoskeletal: Positive for neck pain and neck stiffness.   Neurological: Positive for weakness (L arm ). Negative for numbness. Light-headedness:          Objective     There were no vitals filed for this visit.  There is no height or weight on file to calculate BMI.      Physical Exam  Neurologic Exam  No physical exam because this was a telephone visit      Assessment/Plan     Medical Decision Makin-year-old male s/p C5-6 ACDF on 2021  -He has recovered very well from his ACDF procedure.  He reports still occasional stiffness and pain in his left shoulder and neck however this has significantly improved.  He reports he occasionally will take ibuprofen and this relieves the pain.  He is no longer taking gabapentin.  His strength is near normal.  -I have asked him to call back as needed if he develops any new weakness, sensory changes, worsening neck pain.  Diagnoses and all orders for this visit:    1. Fusion of spine, cervical region (Primary)      Return if symptoms worsen or fail to improve.  This was a telephone visit we spoke for 5 minutes.

## 2022-03-25 ENCOUNTER — OFFICE VISIT (OUTPATIENT)
Dept: NEUROSURGERY | Facility: CLINIC | Age: 37
End: 2022-03-25

## 2022-03-25 DIAGNOSIS — M43.22 FUSION OF SPINE, CERVICAL REGION: Primary | ICD-10-CM

## 2022-03-25 PROCEDURE — 99441 PR PHYS/QHP TELEPHONE EVALUATION 5-10 MIN: CPT | Performed by: NEUROLOGICAL SURGERY

## 2025-04-16 ENCOUNTER — OFFICE VISIT (OUTPATIENT)
Dept: FAMILY MEDICINE CLINIC | Facility: CLINIC | Age: 40
End: 2025-04-16
Payer: COMMERCIAL

## 2025-04-16 VITALS
HEIGHT: 67 IN | TEMPERATURE: 97.5 F | BODY MASS INDEX: 26.9 KG/M2 | HEART RATE: 80 BPM | SYSTOLIC BLOOD PRESSURE: 138 MMHG | DIASTOLIC BLOOD PRESSURE: 88 MMHG | WEIGHT: 171.4 LBS | OXYGEN SATURATION: 97 %

## 2025-04-16 DIAGNOSIS — M54.12 CERVICAL RADICULAR PAIN: ICD-10-CM

## 2025-04-16 DIAGNOSIS — M54.42 ACUTE MIDLINE LOW BACK PAIN WITH LEFT-SIDED SCIATICA: ICD-10-CM

## 2025-04-16 DIAGNOSIS — M50.30 DEGENERATIVE DISC DISEASE, CERVICAL: Primary | ICD-10-CM

## 2025-04-16 PROCEDURE — 99204 OFFICE O/P NEW MOD 45 MIN: CPT | Performed by: NURSE PRACTITIONER

## 2025-04-16 RX ORDER — CELECOXIB 200 MG/1
200 CAPSULE ORAL 2 TIMES DAILY
Qty: 60 CAPSULE | Refills: 5 | Status: SHIPPED | OUTPATIENT
Start: 2025-04-16

## 2025-04-16 RX ORDER — METHYLPREDNISOLONE 4 MG/1
TABLET ORAL
Qty: 21 TABLET | Refills: 0 | Status: SHIPPED | OUTPATIENT
Start: 2025-04-16

## 2025-04-16 NOTE — PROGRESS NOTES
"Chief Complaint  Annual Exam and Medication Problem (Ibuprofen is not working well. Would like to talk about a replacement med)    Subjective        Abdi Tyler presents to [facility]  History of Present Illness  Pleasant gentleman, well-known to me I saw him several years ago, just had need to come back  He gets a yearly physical and lab at work,  He has had disc herniation in his lower neck, and surgery in 2021, he improved but he does have chronic neck pain with radiculopathy symptoms, but no focal weakness for the most part he improved substantially,  Recently had an acute back injury, had been lifting but no specific injury  Mid back and radiates down his left leg ankles, but no weakness no bowel or bladder incontinence retention or saddlebag paresthesias no fever chills no risk factors for osteomyelitis  His son 4-year-old, attends school close, patient is , works full-time,  Generally healthy  But his neck pain, it flares up at work he lifts heavy objects, does not have light duty  During flareups he is taking around the 1000 mg over-the-counter naproxen about 5 tablets twice daily,  Had no gastritis or other symptoms but simply taking too many anti-inflammatories he has been talking about this with his wife and he is here just to stay healthy    No hypertension blood pressure check normally at work always good 138/88 today with his recheck he had traffic to get here and scraped his paint on his car unfortunately      Objective   Vital Signs:  /94   Pulse 80   Temp 97.5 °F (36.4 °C) (Temporal)   Ht 170.2 cm (67\")   Wt 77.7 kg (171 lb 6.4 oz)   SpO2 97%   BMI 26.85 kg/m²   Estimated body mass index is 26.85 kg/m² as calculated from the following:    Height as of this encounter: 170.2 cm (67\").    Weight as of this encounter: 77.7 kg (171 lb 6.4 oz).          Physical Exam  Vitals reviewed.   Constitutional:       General: He is not in acute distress.     " Appearance: Normal appearance. He is well-developed. He is not ill-appearing, toxic-appearing or diaphoretic.   HENT:      Head: Normocephalic.      Right Ear: Tympanic membrane normal.      Left Ear: Tympanic membrane normal.      Nose: Nose normal.      Mouth/Throat:      Mouth: Mucous membranes are moist.   Eyes:      General: No scleral icterus.     Conjunctiva/sclera: Conjunctivae normal.      Pupils: Pupils are equal, round, and reactive to light.   Neck:      Thyroid: No thyromegaly.      Vascular: No JVD.      Comments: Carotids clear  Cardiovascular:      Rate and Rhythm: Normal rate and regular rhythm.      Heart sounds: Normal heart sounds. No murmur heard.     No friction rub. No gallop.   Pulmonary:      Effort: Pulmonary effort is normal. No respiratory distress.      Breath sounds: Normal breath sounds. No stridor. No wheezing or rales.   Abdominal:      General: Bowel sounds are normal. There is no distension.      Palpations: Abdomen is soft.      Tenderness: There is no abdominal tenderness.      Comments: No hepatosplenomegaly, no ascites,   Musculoskeletal:         General: No tenderness.      Cervical back: Neck supple.      Comments: Negative straight leg raise plantarflexion dorsiflexion normal, some mildly restricted range of motion neck no focal neck pain  strength normal upper strength and muscle develop normal   Lymphadenopathy:      Cervical: No cervical adenopathy.   Skin:     General: Skin is warm and dry.      Capillary Refill: Capillary refill takes less than 2 seconds.      Findings: No erythema or rash.   Neurological:      Mental Status: He is alert and oriented to person, place, and time. Mental status is at baseline.      Deep Tendon Reflexes: Reflexes are normal and symmetric.   Psychiatric:         Mood and Affect: Mood normal.         Behavior: Behavior normal.         Thought Content: Thought content normal.         Judgment: Judgment normal.      Result Review :                 Assessment and Plan   Diagnoses and all orders for this visit:    1. Degenerative disc disease, cervical (Primary)  -     CBC & Differential  -     Comprehensive Metabolic Panel  -     Sedimentation Rate    2. Cervical radicular pain  -     CBC & Differential  -     Comprehensive Metabolic Panel  -     Sedimentation Rate    3. Acute midline low back pain with left-sided sciatica  -     CBC & Differential  -     Comprehensive Metabolic Panel  -     Sedimentation Rate    Other orders  -     methylPREDNISolone (MEDROL) 4 MG dose pack; Take as directed on package instructions.  Dispense: 21 tablet; Refill: 0  -     celecoxib (CeleBREX) 200 MG capsule; Take 1 capsule by mouth 2 (Two) Times a Day. As needed for pain, lowest effective dose shortest time possible take with tall glass of water  Dispense: 60 capsule; Refill: 5             Follow Up   Return in about 6 weeks (around 5/28/2025) for Print discharge instructions/educational handouts, Labs today.  Patient was given instructions and counseling regarding his condition or for health maintenance advice. Please see specific information pulled into the AVS if appropriate.     Patient Instructions   Discharge instruction    Discontinue naproxen  Approximate 1000 mg naproxen is likely or could be a toxic dose  You are at increased risk of kidney failure and gastric bleeding gastric perforation,    To mitigate the risk discontinue the  We will have a plan easier on your stomach and mitigate the plan so you can take anti-inflammatory long-term if need be  Always lowest effective dose shortest time possible when you are doing better consider skipping a day every other day  On a severe day take 2  And okay to take at the same time  Regarding Celebrex  24 anti-inflammatory this is easier on the stomach  Typical dose is 200 mg daily  Need to give this 3 weeks before determining effectiveness    But for now, stop NSAIDs Medrol Dosepak steroid anti-inflammatory is more  potent  Tylenol for breakthrough pain if need be    After the Medrol if still having pain then Celebrex 200 mg start 2, then 1 daily    Update me your progress in 3 weeks  Home physical therapy yourself, gently stretch your neck range of motion stretch your ligaments, and your low back  Work around the pain do not push through nerve pain      Heat or ice, lidocaine patch if needed,  Occasionally you may need a neck brace I soft cervical c-collar to place for couple hours a day to help you maintain a neutral position should you have radiculopathy      Increasing or progressive neck pain weakness recheck for further evaluation MRI's,  \

## 2025-04-16 NOTE — PATIENT INSTRUCTIONS
Discharge instruction    Discontinue naproxen  Approximate 1000 mg naproxen is likely or could be a toxic dose  You are at increased risk of kidney failure and gastric bleeding gastric perforation,    To mitigate the risk discontinue the  We will have a plan easier on your stomach and mitigate the plan so you can take anti-inflammatory long-term if need be  Always lowest effective dose shortest time possible when you are doing better consider skipping a day every other day  On a severe day take 2  And okay to take at the same time  Regarding Celebrex  24 anti-inflammatory this is easier on the stomach  Typical dose is 200 mg daily  Need to give this 3 weeks before determining effectiveness    But for now, stop NSAIDs Medrol Dosepak steroid anti-inflammatory is more potent  Tylenol for breakthrough pain if need be    After the Medrol if still having pain then Celebrex 200 mg start 2, then 1 daily    Update me your progress in 3 weeks  Home physical therapy yourself, gently stretch your neck range of motion stretch your ligaments, and your low back  Work around the pain do not push through nerve pain      Heat or ice, lidocaine patch if needed,  Occasionally you may need a neck brace I soft cervical c-collar to place for couple hours a day to help you maintain a neutral position should you have radiculopathy      Increasing or progressive neck pain weakness recheck for further evaluation MRI's,  \

## 2025-04-17 ENCOUNTER — PATIENT ROUNDING (BHMG ONLY) (OUTPATIENT)
Dept: FAMILY MEDICINE CLINIC | Facility: CLINIC | Age: 40
End: 2025-04-17
Payer: COMMERCIAL

## 2025-04-17 LAB
ALBUMIN SERPL-MCNC: 4.6 G/DL (ref 3.5–5.2)
ALBUMIN/GLOB SERPL: 1.8 G/DL
ALP SERPL-CCNC: 71 U/L (ref 39–117)
ALT SERPL-CCNC: 38 U/L (ref 1–41)
AST SERPL-CCNC: 30 U/L (ref 1–40)
BASOPHILS # BLD AUTO: 0.07 10*3/MM3 (ref 0–0.2)
BASOPHILS NFR BLD AUTO: 1.4 % (ref 0–1.5)
BILIRUB SERPL-MCNC: 0.5 MG/DL (ref 0–1.2)
BUN SERPL-MCNC: 13 MG/DL (ref 6–20)
BUN/CREAT SERPL: 14.1 (ref 7–25)
CALCIUM SERPL-MCNC: 9.8 MG/DL (ref 8.6–10.5)
CHLORIDE SERPL-SCNC: 102 MMOL/L (ref 98–107)
CO2 SERPL-SCNC: 24.9 MMOL/L (ref 22–29)
CREAT SERPL-MCNC: 0.92 MG/DL (ref 0.76–1.27)
EGFRCR SERPLBLD CKD-EPI 2021: 106.5 ML/MIN/1.73
EOSINOPHIL # BLD AUTO: 0.23 10*3/MM3 (ref 0–0.4)
EOSINOPHIL NFR BLD AUTO: 4.7 % (ref 0.3–6.2)
ERYTHROCYTE [DISTWIDTH] IN BLOOD BY AUTOMATED COUNT: 12 % (ref 12.3–15.4)
ERYTHROCYTE [SEDIMENTATION RATE] IN BLOOD BY WESTERGREN METHOD: 6 MM/HR (ref 0–15)
GLOBULIN SER CALC-MCNC: 2.6 GM/DL
GLUCOSE SERPL-MCNC: 86 MG/DL (ref 65–99)
HCT VFR BLD AUTO: 45.4 % (ref 37.5–51)
HGB BLD-MCNC: 15.3 G/DL (ref 13–17.7)
IMM GRANULOCYTES # BLD AUTO: 0.01 10*3/MM3 (ref 0–0.05)
IMM GRANULOCYTES NFR BLD AUTO: 0.2 % (ref 0–0.5)
LYMPHOCYTES # BLD AUTO: 0.85 10*3/MM3 (ref 0.7–3.1)
LYMPHOCYTES NFR BLD AUTO: 17.3 % (ref 19.6–45.3)
MCH RBC QN AUTO: 30 PG (ref 26.6–33)
MCHC RBC AUTO-ENTMCNC: 33.7 G/DL (ref 31.5–35.7)
MCV RBC AUTO: 89 FL (ref 79–97)
MONOCYTES # BLD AUTO: 0.64 10*3/MM3 (ref 0.1–0.9)
MONOCYTES NFR BLD AUTO: 13 % (ref 5–12)
NEUTROPHILS # BLD AUTO: 3.12 10*3/MM3 (ref 1.7–7)
NEUTROPHILS NFR BLD AUTO: 63.4 % (ref 42.7–76)
NRBC BLD AUTO-RTO: 0 /100 WBC (ref 0–0.2)
PLATELET # BLD AUTO: 312 10*3/MM3 (ref 140–450)
POTASSIUM SERPL-SCNC: 4.5 MMOL/L (ref 3.5–5.2)
PROT SERPL-MCNC: 7.2 G/DL (ref 6–8.5)
RBC # BLD AUTO: 5.1 10*6/MM3 (ref 4.14–5.8)
SODIUM SERPL-SCNC: 139 MMOL/L (ref 136–145)
WBC # BLD AUTO: 4.92 10*3/MM3 (ref 3.4–10.8)

## 2025-04-17 NOTE — PROGRESS NOTES
A My-Chart message has been sent to the patient for PATIENT ROUNDING with [facility]

## 2025-04-22 RX ORDER — PREDNISONE 10 MG/1
TABLET ORAL
Qty: 23 TABLET | Refills: 0 | Status: SHIPPED | OUTPATIENT
Start: 2025-04-22 | End: 2025-04-23 | Stop reason: SDUPTHER

## 2025-04-23 RX ORDER — PREDNISONE 10 MG/1
TABLET ORAL
Qty: 23 TABLET | Refills: 0 | Status: SHIPPED | OUTPATIENT
Start: 2025-04-23

## 2025-05-08 DIAGNOSIS — M54.16 LUMBAR RADICULOPATHY, ACUTE: Primary | ICD-10-CM

## 2025-05-08 DIAGNOSIS — M54.42 ACUTE LEFT-SIDED LOW BACK PAIN WITH LEFT-SIDED SCIATICA: ICD-10-CM

## 2025-05-09 RX ORDER — CYCLOBENZAPRINE HCL 5 MG
5-10 TABLET ORAL NIGHTLY PRN
Qty: 45 TABLET | Refills: 2 | Status: SHIPPED | OUTPATIENT
Start: 2025-05-09

## 2025-05-15 ENCOUNTER — OFFICE VISIT (OUTPATIENT)
Dept: FAMILY MEDICINE CLINIC | Facility: CLINIC | Age: 40
End: 2025-05-15
Payer: COMMERCIAL

## 2025-05-15 ENCOUNTER — TELEPHONE (OUTPATIENT)
Dept: FAMILY MEDICINE CLINIC | Facility: CLINIC | Age: 40
End: 2025-05-15

## 2025-05-15 VITALS
HEIGHT: 67 IN | SYSTOLIC BLOOD PRESSURE: 137 MMHG | OXYGEN SATURATION: 98 % | HEART RATE: 70 BPM | BODY MASS INDEX: 26.97 KG/M2 | WEIGHT: 171.8 LBS | DIASTOLIC BLOOD PRESSURE: 98 MMHG | TEMPERATURE: 97.3 F

## 2025-05-15 DIAGNOSIS — M54.42 ACUTE MIDLINE LOW BACK PAIN WITH LEFT-SIDED SCIATICA: ICD-10-CM

## 2025-05-15 DIAGNOSIS — Z79.899 HIGH RISK MEDICATION USE: ICD-10-CM

## 2025-05-15 DIAGNOSIS — M54.16 LUMBAR RADICULOPATHY, ACUTE: Primary | ICD-10-CM

## 2025-05-15 PROCEDURE — 99214 OFFICE O/P EST MOD 30 MIN: CPT | Performed by: NURSE PRACTITIONER

## 2025-05-15 RX ORDER — GABAPENTIN 300 MG/1
300 CAPSULE ORAL 3 TIMES DAILY
Qty: 45 CAPSULE | Refills: 0 | Status: SHIPPED | OUTPATIENT
Start: 2025-05-15

## 2025-05-15 NOTE — PATIENT INSTRUCTIONS
Continue physical therapy, conservative measures, stretches, but  Severe pain fever chills weakness emergency room bowel or bladder incontinence retention [place] paresthesias ER    Gabapentin start bedtime go up slowly to prevent sedation falls  Any sedation do not drive to avoid motor vehicle accident, plenty fluids recheck in 2 weeks

## 2025-05-15 NOTE — PROGRESS NOTES
"Chief Complaint  degenerative disc disease    Subjective        Abdi Tyler presents to [facility]  History of Present Illness  Pleasant patient here today, follow-up radiculopathy pain mid lower back radiates down his left leg down his ankle and foot, severe pain, difficult time sleeping, still working, keeps moving around helps the pain,  Is one of his work no focal weakness no fever chills night sweats no unexplained weight loss no history of malignancy.  Medrol help minimally, Celebrex twice daily 200 mg modestly,  MRI pending physical therapy pending ordered  Wife takes gabapentin, it did help, he is not requesting medication no drug-seeking, does not want to miss work  FMLA disability in case he needs to leave work early for appointment [facility] physical therapy          Objective   Vital Signs:  /98 (BP Location: Right arm, Patient Position: Sitting, Cuff Size: Adult)   Pulse 70   Temp 97.3 °F (36.3 °C) (Temporal)   Ht 170.2 cm (67\")   Wt 77.9 kg (171 lb 12.8 oz)   SpO2 98%   BMI 26.91 kg/m²   Estimated body mass index is 26.91 kg/m² as calculated from the following:    Height as of this encounter: 170.2 cm (67\").    Weight as of this encounter: 77.9 kg (171 lb 12.8 oz).            Physical Exam  Constitutional:       General: He is not in acute distress.     Appearance: Normal appearance. He is not ill-appearing, toxic-appearing or diaphoretic.      Comments: Pleasant alert appropriate   Eyes:      Conjunctiva/sclera: Conjunctivae normal.      Pupils: Pupils are equal, round, and reactive to light.   Musculoskeletal:      Comments: Change in position and standing, then he will bend over, no exaggerated response no focal weakness deferred straight leg raise exam today.   Neurological:      General: No focal deficit present.      Mental Status: Mental status is at baseline.   Psychiatric:         Mood and Affect: Mood normal.         Behavior: Behavior normal.    "      Thought Content: Thought content normal.         Judgment: Judgment normal.        Result Review :                Assessment and Plan   Diagnoses and all orders for this visit:    1. Lumbar radiculopathy, acute (Primary)    2. Acute midline low back pain with left-sided sciatica    3. High risk medication use    Other orders  -     gabapentin (NEURONTIN) 300 MG capsule; Take 1 capsule by mouth 3 (Three) Times a Day. (Titrate slowly as needed caution falls sedation possible)  Dispense: 45 capsule; Refill: 0             Follow Up   No follow-ups on file.  Patient was given instructions and counseling regarding his condition or for health maintenance advice. Please see specific information pulled into the AVS if appropriate.     There are no Patient Instructions on file for this visit.     Gabapentin alternative discussed risk-benefit risk of sedation, do not drive after taking gabapentin  Could cause drowsiness, weakness falls  Lowest effective dose risk of addiction dependence  Alternatives he has no family history no personal history of addiction dependence    This is lower risk than alternative opiates  Continue Celebrex plenty of fluids recheck in 2 weeks  [facility] hernadez controlled subs [facility]  Temporary acute management only  We will have a plan to exit  Recheck 2 weeks UDS at follow-up

## 2025-05-19 ENCOUNTER — TELEPHONE (OUTPATIENT)
Dept: FAMILY MEDICINE CLINIC | Facility: CLINIC | Age: 40
End: 2025-05-19

## 2025-05-19 NOTE — TELEPHONE ENCOUNTER
PT is stating his work will not let him return to work. PT is going to refax fmla forms to refill out.

## 2025-05-19 NOTE — TELEPHONE ENCOUNTER
Hub staff attempted to follow warm transfer process and was unsuccessful     Caller: Abdi Tyler    Relationship to patient: Self    Best call back number: [phone]    Patient is needing: PATIENT IS CALLING TO CLARIFY HIS WORK RESTRICTIONS LETTER.  HE STATES HE THINKS THE RESTRICTIONS ARE NOT CORRECT AND HIS EMPLOYER WILL NOT LET HIM GO BACK TO WORK WITHOUT CLARIFICATION.      UNABLE TO WARM TRANSFER.    PLEASE ADVISE.

## 2025-05-19 NOTE — TELEPHONE ENCOUNTER
Either he needs to come back and so we can do this together  Or     Or do some investigations see if he is referring to a specific work restriction letter independent of FMLA?    Or is he referring to time alone off for PT [facility] in the fmla     Please clarify the problem so we can get it fixed for him thank you

## 2025-05-20 ENCOUNTER — HOSPITAL ENCOUNTER (OUTPATIENT)
Facility: HOSPITAL | Age: 40
Setting detail: OBSERVATION
Discharge: HOME | End: 2025-05-22
Attending: EMERGENCY MEDICINE | Admitting: EMERGENCY MEDICINE
Payer: COMMERCIAL

## 2025-05-20 ENCOUNTER — APPOINTMENT (OUTPATIENT)
Dept: MRI IMAGING | Facility: HOSPITAL | Age: 40
End: 2025-05-20
Payer: COMMERCIAL

## 2025-05-20 ENCOUNTER — TELEPHONE (OUTPATIENT)
Dept: FAMILY MEDICINE CLINIC | Facility: CLINIC | Age: 40
End: 2025-05-20

## 2025-05-20 DIAGNOSIS — M54.32 LEFT SIDED SCIATICA: ICD-10-CM

## 2025-05-20 DIAGNOSIS — R55 VASOVAGAL SYNCOPE: Primary | ICD-10-CM

## 2025-05-20 DIAGNOSIS — M54.42 ACUTE LEFT-SIDED LOW BACK PAIN WITH LEFT-SIDED SCIATICA: ICD-10-CM

## 2025-05-20 PROBLEM — M54.9 BACK PAIN: Status: ACTIVE | Noted: 2025-05-20

## 2025-05-20 LAB
ALBUMIN SERPL-MCNC: 4.4 G/DL (ref 3.5–5.2)
ALBUMIN/GLOB SERPL: 1.9 G/DL
ALP SERPL-CCNC: 61 U/L (ref 39–117)
ALT SERPL W P-5'-P-CCNC: 39 U/L (ref 1–41)
ANION GAP SERPL CALCULATED.3IONS-SCNC: 6.9 MMOL/L (ref 5–15)
AST SERPL-CCNC: 28 U/L (ref 1–40)
BASOPHILS # BLD AUTO: 0.07 10*3/MM3 (ref 0–0.2)
BASOPHILS NFR BLD AUTO: 0.7 % (ref 0–1.5)
BILIRUB SERPL-MCNC: 0.4 MG/DL (ref 0–1.2)
BUN SERPL-MCNC: 13 MG/DL (ref 6–20)
BUN/CREAT SERPL: 13.7 (ref 7–25)
CALCIUM SPEC-SCNC: 9.3 MG/DL (ref 8.6–10.5)
CHLORIDE SERPL-SCNC: 104 MMOL/L (ref 98–107)
CO2 SERPL-SCNC: 25.1 MMOL/L (ref 22–29)
CREAT SERPL-MCNC: 0.95 MG/DL (ref 0.76–1.27)
D DIMER PPP FEU-MCNC: <0.27 MCGFEU/ML (ref 0–0.5)
DEPRECATED RDW RBC AUTO: 41.8 FL (ref 37–54)
EGFRCR SERPLBLD CKD-EPI 2021: 102.5 ML/MIN/1.73
EOSINOPHIL # BLD AUTO: 0.09 10*3/MM3 (ref 0–0.4)
EOSINOPHIL NFR BLD AUTO: 0.9 % (ref 0.3–6.2)
ERYTHROCYTE [DISTWIDTH] IN BLOOD BY AUTOMATED COUNT: 12.5 % (ref 12.3–15.4)
GEN 5 1HR TROPONIN T REFLEX: 6 NG/L
GLOBULIN UR ELPH-MCNC: 2.3 GM/DL
GLUCOSE SERPL-MCNC: 122 MG/DL (ref 65–99)
HCT VFR BLD AUTO: 43.1 % (ref 37.5–51)
HGB BLD-MCNC: 14.6 G/DL (ref 13–17.7)
IMM GRANULOCYTES # BLD AUTO: 0.04 10*3/MM3 (ref 0–0.05)
IMM GRANULOCYTES NFR BLD AUTO: 0.4 % (ref 0–0.5)
LYMPHOCYTES # BLD AUTO: 0.65 10*3/MM3 (ref 0.7–3.1)
LYMPHOCYTES NFR BLD AUTO: 6.8 % (ref 19.6–45.3)
MCH RBC QN AUTO: 31.1 PG (ref 26.6–33)
MCHC RBC AUTO-ENTMCNC: 33.9 G/DL (ref 31.5–35.7)
MCV RBC AUTO: 91.9 FL (ref 79–97)
MONOCYTES # BLD AUTO: 0.42 10*3/MM3 (ref 0.1–0.9)
MONOCYTES NFR BLD AUTO: 4.4 % (ref 5–12)
NEUTROPHILS NFR BLD AUTO: 8.27 10*3/MM3 (ref 1.7–7)
NEUTROPHILS NFR BLD AUTO: 86.8 % (ref 42.7–76)
NRBC BLD AUTO-RTO: 0 /100 WBC (ref 0–0.2)
PLATELET # BLD AUTO: 305 10*3/MM3 (ref 140–450)
PMV BLD AUTO: 8.6 FL (ref 6–12)
POTASSIUM SERPL-SCNC: 4.2 MMOL/L (ref 3.5–5.2)
PROT SERPL-MCNC: 6.7 G/DL (ref 6–8.5)
QT INTERVAL: 378 MS
QTC INTERVAL: 419 MS
RBC # BLD AUTO: 4.69 10*6/MM3 (ref 4.14–5.8)
SODIUM SERPL-SCNC: 136 MMOL/L (ref 136–145)
TROPONIN T NUMERIC DELTA: 0 NG/L
TROPONIN T SERPL HS-MCNC: 6 NG/L
WBC NRBC COR # BLD AUTO: 9.54 10*3/MM3 (ref 3.4–10.8)

## 2025-05-20 PROCEDURE — 96375 TX/PRO/DX INJ NEW DRUG ADDON: CPT

## 2025-05-20 PROCEDURE — G0378 HOSPITAL OBSERVATION PER HR: HCPCS

## 2025-05-20 PROCEDURE — 36415 COLL VENOUS BLD VENIPUNCTURE: CPT

## 2025-05-20 PROCEDURE — 80053 COMPREHEN METABOLIC PANEL: CPT | Performed by: PHYSICIAN ASSISTANT

## 2025-05-20 PROCEDURE — 99221 1ST HOSP IP/OBS SF/LOW 40: CPT | Performed by: NURSE PRACTITIONER

## 2025-05-20 PROCEDURE — 93005 ELECTROCARDIOGRAM TRACING: CPT | Performed by: PHYSICIAN ASSISTANT

## 2025-05-20 PROCEDURE — 25010000002 HYDROMORPHONE PER 4 MG: Performed by: EMERGENCY MEDICINE

## 2025-05-20 PROCEDURE — 99285 EMERGENCY DEPT VISIT HI MDM: CPT

## 2025-05-20 PROCEDURE — 25810000003 SODIUM CHLORIDE 0.9 % SOLUTION: Performed by: PHYSICIAN ASSISTANT

## 2025-05-20 PROCEDURE — 96376 TX/PRO/DX INJ SAME DRUG ADON: CPT

## 2025-05-20 PROCEDURE — 96374 THER/PROPH/DIAG INJ IV PUSH: CPT

## 2025-05-20 PROCEDURE — 85379 FIBRIN DEGRADATION QUANT: CPT | Performed by: PHYSICIAN ASSISTANT

## 2025-05-20 PROCEDURE — 84484 ASSAY OF TROPONIN QUANT: CPT | Performed by: PHYSICIAN ASSISTANT

## 2025-05-20 PROCEDURE — 36415 COLL VENOUS BLD VENIPUNCTURE: CPT | Performed by: PHYSICIAN ASSISTANT

## 2025-05-20 PROCEDURE — 93010 ELECTROCARDIOGRAM REPORT: CPT | Performed by: INTERNAL MEDICINE

## 2025-05-20 PROCEDURE — 25010000002 DEXAMETHASONE PER 1 MG: Performed by: NURSE PRACTITIONER

## 2025-05-20 PROCEDURE — 85025 COMPLETE CBC W/AUTO DIFF WBC: CPT | Performed by: PHYSICIAN ASSISTANT

## 2025-05-20 PROCEDURE — 72148 MRI LUMBAR SPINE W/O DYE: CPT

## 2025-05-20 RX ORDER — SODIUM CHLORIDE 0.9 % (FLUSH) 0.9 %
10 SYRINGE (ML) INJECTION EVERY 12 HOURS SCHEDULED
Status: DISCONTINUED | OUTPATIENT
Start: 2025-05-20 | End: 2025-05-22 | Stop reason: HOSPADM

## 2025-05-20 RX ORDER — BISACODYL 5 MG/1
5 TABLET, DELAYED RELEASE ORAL DAILY PRN
Status: DISCONTINUED | OUTPATIENT
Start: 2025-05-20 | End: 2025-05-22 | Stop reason: HOSPADM

## 2025-05-20 RX ORDER — METHOCARBAMOL 500 MG/1
500 TABLET, FILM COATED ORAL 4 TIMES DAILY
Status: DISCONTINUED | OUTPATIENT
Start: 2025-05-20 | End: 2025-05-22 | Stop reason: HOSPADM

## 2025-05-20 RX ORDER — NALOXONE HCL 0.4 MG/ML
0.4 VIAL (ML) INJECTION
Status: DISCONTINUED | OUTPATIENT
Start: 2025-05-20 | End: 2025-05-22 | Stop reason: HOSPADM

## 2025-05-20 RX ORDER — NITROGLYCERIN 0.4 MG/1
0.4 TABLET SUBLINGUAL
Status: DISCONTINUED | OUTPATIENT
Start: 2025-05-20 | End: 2025-05-22 | Stop reason: HOSPADM

## 2025-05-20 RX ORDER — ACETAMINOPHEN 160 MG/5ML
650 SOLUTION ORAL EVERY 4 HOURS PRN
Status: DISCONTINUED | OUTPATIENT
Start: 2025-05-20 | End: 2025-05-22 | Stop reason: HOSPADM

## 2025-05-20 RX ORDER — LIDOCAINE 4 G/G
2 PATCH TOPICAL
Status: DISCONTINUED | OUTPATIENT
Start: 2025-05-20 | End: 2025-05-22 | Stop reason: HOSPADM

## 2025-05-20 RX ORDER — AMOXICILLIN 250 MG
2 CAPSULE ORAL 2 TIMES DAILY PRN
Status: DISCONTINUED | OUTPATIENT
Start: 2025-05-20 | End: 2025-05-22 | Stop reason: HOSPADM

## 2025-05-20 RX ORDER — SODIUM CHLORIDE 9 MG/ML
40 INJECTION, SOLUTION INTRAVENOUS AS NEEDED
Status: DISCONTINUED | OUTPATIENT
Start: 2025-05-20 | End: 2025-05-22 | Stop reason: HOSPADM

## 2025-05-20 RX ORDER — ACETAMINOPHEN 650 MG/1
650 SUPPOSITORY RECTAL EVERY 4 HOURS PRN
Status: DISCONTINUED | OUTPATIENT
Start: 2025-05-20 | End: 2025-05-22 | Stop reason: HOSPADM

## 2025-05-20 RX ORDER — GABAPENTIN 300 MG/1
300 CAPSULE ORAL 3 TIMES DAILY
Status: DISCONTINUED | OUTPATIENT
Start: 2025-05-20 | End: 2025-05-22 | Stop reason: HOSPADM

## 2025-05-20 RX ORDER — BISACODYL 10 MG
10 SUPPOSITORY, RECTAL RECTAL DAILY PRN
Status: DISCONTINUED | OUTPATIENT
Start: 2025-05-20 | End: 2025-05-22 | Stop reason: HOSPADM

## 2025-05-20 RX ORDER — DEXAMETHASONE SODIUM PHOSPHATE 10 MG/ML
10 INJECTION, SOLUTION INTRA-ARTICULAR; INTRALESIONAL; INTRAMUSCULAR; INTRAVENOUS; SOFT TISSUE ONCE
Status: COMPLETED | OUTPATIENT
Start: 2025-05-20 | End: 2025-05-20

## 2025-05-20 RX ORDER — DEXAMETHASONE SODIUM PHOSPHATE 4 MG/ML
4 INJECTION, SOLUTION INTRA-ARTICULAR; INTRALESIONAL; INTRAMUSCULAR; INTRAVENOUS; SOFT TISSUE EVERY 6 HOURS
Status: DISCONTINUED | OUTPATIENT
Start: 2025-05-20 | End: 2025-05-22 | Stop reason: HOSPADM

## 2025-05-20 RX ORDER — HYDROMORPHONE HYDROCHLORIDE 1 MG/ML
0.5 INJECTION, SOLUTION INTRAMUSCULAR; INTRAVENOUS; SUBCUTANEOUS EVERY 4 HOURS PRN
Status: DISCONTINUED | OUTPATIENT
Start: 2025-05-20 | End: 2025-05-22 | Stop reason: HOSPADM

## 2025-05-20 RX ORDER — HYDROCODONE BITARTRATE AND ACETAMINOPHEN 5; 325 MG/1; MG/1
1 TABLET ORAL EVERY 6 HOURS PRN
Refills: 0 | Status: DISCONTINUED | OUTPATIENT
Start: 2025-05-20 | End: 2025-05-22 | Stop reason: HOSPADM

## 2025-05-20 RX ORDER — ACETAMINOPHEN 325 MG/1
650 TABLET ORAL EVERY 4 HOURS PRN
Status: DISCONTINUED | OUTPATIENT
Start: 2025-05-20 | End: 2025-05-22 | Stop reason: HOSPADM

## 2025-05-20 RX ORDER — SODIUM CHLORIDE 0.9 % (FLUSH) 0.9 %
10 SYRINGE (ML) INJECTION AS NEEDED
Status: DISCONTINUED | OUTPATIENT
Start: 2025-05-20 | End: 2025-05-22 | Stop reason: HOSPADM

## 2025-05-20 RX ORDER — POLYETHYLENE GLYCOL 3350 17 G/17G
17 POWDER, FOR SOLUTION ORAL DAILY PRN
Status: DISCONTINUED | OUTPATIENT
Start: 2025-05-20 | End: 2025-05-22 | Stop reason: HOSPADM

## 2025-05-20 RX ADMIN — METHOCARBAMOL TABLETS 500 MG: 500 TABLET, COATED ORAL at 17:46

## 2025-05-20 RX ADMIN — GABAPENTIN 300 MG: 300 CAPSULE ORAL at 20:08

## 2025-05-20 RX ADMIN — Medication 10 ML: at 13:31

## 2025-05-20 RX ADMIN — BISACODYL 5 MG: 5 TABLET, COATED ORAL at 16:05

## 2025-05-20 RX ADMIN — HYDROMORPHONE HYDROCHLORIDE 0.5 MG: 1 INJECTION, SOLUTION INTRAMUSCULAR; INTRAVENOUS; SUBCUTANEOUS at 13:31

## 2025-05-20 RX ADMIN — METHOCARBAMOL TABLETS 500 MG: 500 TABLET, COATED ORAL at 13:30

## 2025-05-20 RX ADMIN — GABAPENTIN 300 MG: 300 CAPSULE ORAL at 13:32

## 2025-05-20 RX ADMIN — Medication 10 ML: at 20:10

## 2025-05-20 RX ADMIN — LIDOCAINE 2 PATCH: 4 PATCH TOPICAL at 13:31

## 2025-05-20 RX ADMIN — HYDROMORPHONE HYDROCHLORIDE 0.5 MG: 1 INJECTION, SOLUTION INTRAMUSCULAR; INTRAVENOUS; SUBCUTANEOUS at 20:09

## 2025-05-20 RX ADMIN — DEXAMETHASONE SODIUM PHOSPHATE 4 MG: 4 INJECTION, SOLUTION INTRAMUSCULAR; INTRAVENOUS at 17:46

## 2025-05-20 RX ADMIN — METHOCARBAMOL TABLETS 500 MG: 500 TABLET, COATED ORAL at 20:08

## 2025-05-20 RX ADMIN — HYDROCODONE BITARTRATE AND ACETAMINOPHEN 1 TABLET: 5; 325 TABLET ORAL at 16:05

## 2025-05-20 RX ADMIN — DEXAMETHASONE SODIUM PHOSPHATE 10 MG: 10 INJECTION INTRAMUSCULAR; INTRAVENOUS at 13:30

## 2025-05-20 RX ADMIN — SODIUM CHLORIDE 1000 ML: 9 INJECTION, SOLUTION INTRAVENOUS at 10:45

## 2025-05-20 NOTE — ED NOTES
Pt took 900 mg gabapentin this morning.     Surgeon/Pathologist Verbiage (Will Incorporate Name Of Surgeon From Intro If Not Blank): operated in two distinct and integrated capacities as the surgeon and pathologist.

## 2025-05-20 NOTE — TELEPHONE ENCOUNTER
UNABLE TO WARM TRANSFER TO THE OFFICE        Caller: Abdi Tyler     Relationship: SELF     Best call back number:     [phone]       What is your medical concern?     PATIENT WANTS YOU TO KNOW THAT HE IS GOING TO GO TO THE [facility], [place], operated by [facility] EMERGENCY ROOM TO BE SEEN FOR HIS PINCHED NERVE IN BACK.  PATIENT STATED THAT HE FAINTED THIS MORNING WHEN TRYING TO GET OUT OF BED.  HE IS HOPING THEY CAN DO A MRI AT THE EMERGENCY ROOM.

## 2025-05-20 NOTE — PLAN OF CARE
Goal Outcome Evaluation:  Plan of Care Reviewed With: patient        Progress: no change  Outcome Evaluation: pt came in for Low back pain. AO4, VSS, up ad tom. Still to do MRI LS and pending Neurosurgery consult. Regular diet tolerated, to keep NPO midnight

## 2025-05-20 NOTE — H&P
[facility]   HISTORY AND PHYSICAL    Patient Name: Abdi Tyler  : 1985  MRN: [id number]  Primary Care Physician:  Epley, James, APRN  Date of admission: 2025    Subjective   Subjective     Chief Complaint:   Chief Complaint   Patient presents with    Back Pain         HPI:    Abdi Tyler is a 40 y.o. male with PMH of Cervical disc bulge s/p Discectomy with fusion at C5-6  who presents with complaints of progressively worsening lumbar back pain with radiation down left leg for the past 5 weeks and acutely worse today. Patient reports that he had been taking care of a kid the day prior to onset and lifting the kid up and down and playing. Patient reports that he lifts heavy boxes at work all the time but has been having an increasing difficult time performing his work duties. Patient denies any bowel or bladder incontinence, saddle paresthesias, fevers or chills, or falls before or after onset of the pain.  Patient denies any other trauma or possible inciting events.  Patient describes his pain as a 10 out of 10 today and states that when he got up from bed he lightheaded and like his vision was going dark with associated clamminess and he was able to lower himself to the ground with reports of passing out for a few seconds, and a repeat episode when he tried to sit up immediately after the event.  Patient reports that he took 2 gabapentin's while he was on the floor without improvement of his pain so he took a third in about an hour after that he was able to get up off the ground.  Patient was able to drive himself to the hospital.  Patient reports that this pain feels like what his neck pain felt like when he had bulging disc.    Review of Systems   All systems were reviewed and negative except for: All pertinent findings listed in the above HPI    Personal History     No past medical history on file.    Past Surgical History:   Procedure Laterality Date    ANTERIOR CERVICAL DISCECTOMY  W/ FUSION N/A 8/20/2021    Procedure: C5-6 ACDF;  Surgeon: Roebrt Bynum MD;  Location: [facility];  Service: Neurosurgery;  Laterality: N/A;    WISDOM TOOTH EXTRACTION         Family History: family history includes Diabetes in his mother; Hypertension in his mother. Otherwise pertinent FHx was reviewed and not pertinent to current issue.    Social History:  reports that he quit smoking about 3 years ago. His smoking use included cigarettes. He started smoking about 23 years ago. He has a 5 pack-year smoking history. He has never used smokeless tobacco. He reports that he does not drink alcohol and does not use drugs.    Home Medications:  celecoxib, cyclobenzaprine, and gabapentin    Allergies:  No Known Allergies    Objective   Objective     Vitals:   Temp:  [96.8 °F (36 °C)] 96.8 °F (36 °C)  Heart Rate:  [64-84] 64  Resp:  [18] 18  BP: (131)/(92) 131/92  Physical Exam    Constitutional: Awake, alert, nontoxic-appearing male   Eyes: PERRL, sclerae anicteric, no conjunctival injection, EOMI   HENT: NCAT, mucous membranes moist, normal hearing   Neck: Supple, nontender, trachea midline   Respiratory: Clear to auscultation bilaterally, nonlabored respirations on room air   Cardiovascular: RRR, no murmurs, palpable pedal pulses bilaterally   Gastrointestinal: Positive bowel sounds, soft, nontender, nondistended   Musculoskeletal: No bilateral ankle edema, no clubbing or cyanosis to extremities; increased pain with lifting left leg   Psychiatric: Appropriate affect, cooperative   Neurologic: Oriented x 3, strength symmetric in all extremities, Cranial Nerves grossly intact to confrontation, speech clear   Skin: No rashes     Result Review    Result Review:  I have personally reviewed the results from the time of this admission to 5/20/2025 12:12 EDT and agree with these findings:  [x]  Laboratory list / accordion  []  Microbiology  []  Radiology  [x]  EKG/Telemetry   []  Cardiology/Vascular   []   Pathology  []  Old records  []  Other:  Most notable findings include: Troponin negative x 2, potassium 4.2, serum creatinine 0.95, AST ALT within normal limits, D-dimer negative, WBC 9.54 and hemoglobin 14.6.  EKG shows sinus rhythm without acute ST changes      Assessment & Plan   The ASCVD Risk score (Leatha LESTER, et al., 2019) failed to calculate for the following reasons:    Cannot find a previous HDL lab    Cannot find a previous total cholesterol lab    Assessment / Plan     Brief Patient Summary:  Abdi Tyler is a 40 y.o. male who is admitted for acute back pain    Active Hospital Problems:  Active Hospital Problems    Diagnosis     **Back pain      Plan:     Acute back pain:  - Neurosurgery saw and evaluated the patient in the ED and recommending MRI lumbar spine and IV steroids and scheduled muscle relaxers; they will give further recommendations post imaging results  - Multimodal pain regimen available as needed  - PT consulted    Syncope:  - Suspicious for vasovagal episode secondary to pain  - Troponins and EKG negative; D-dimer negative  - Will watch on telemetry to monitor for arrhythmias but low suspicion      VTE Prophylaxis:  Mechanical VTE prophylaxis orders are present.        CODE STATUS:    Code Status (Patient has no pulse and is not breathing): CPR (Attempt to Resuscitate)  Medical Interventions (Patient has pulse or is breathing): Full Support  Level Of Support Discussed With: Patient    Admission Status:  I believe this patient meets observation status.    Electronically signed by Annette Lehman PA-C, 05/20/25, 12:12 PM EDT.        75 minutes has been spent by [facility] providers in the care of this patient while under observation status on this date 05/20/25        I have worn appropriate PPE during this patient encounter, sanitized my hands both with entering and exiting patient's room.    I have discussed plan of care with patient including advance care  plan and/or surrogate decision maker.  Patient advises that their wife Lula will be their primary surrogate decision maker

## 2025-05-20 NOTE — ED PROVIDER NOTES
EMERGENCY DEPARTMENT ENCOUNTER    Room Number:  104/1  Date of encounter:  5/20/2025  PCP: Epley, James, APRN  Historian: Patient  Chronic or social conditions impacting care (social determinants of health): None    HPI:  Chief Complaint: Low back pain, syncope  A complete HPI/ROS/PMH/PSH/SH/FH are unobtainable due to: Nothing    Context: Abdi Tyler is a 40 y.o. male with a history of previous cervical spine surgery, who presents to the ED c/o acute low back pain with radiation down to the left great toe for 5 weeks.  He does complain of numbness however denies any overt weakness, saddle paresthesias, incontinence.  He has been following with his primary care doctor over the past several weeks who has been prescribing Neurontin and Celebrex with mild improvement. His primary care doctor did set him up for an outpatient MRI which is yet to be performed.  Patient has seen Dr. Bynum in the past with a cervical disc issue with subsequent surgery.    Patient states that he had an episode of pain this morning while trying to get out of bed which was so severe that caused him to pass out.  Patient states he attempted to stand up and suddenly felt lightheaded, sweaty.  He reports falling to the ground and passing out for several seconds.  Denies any chest pain or shortness of breath associated with this.  He states he attempted to get up again and had another episode of pain and actually passed out for a second time.  He was able to eventually take an extra Neurontin and states his pain is improved at this time.  He denies any cardiac history.    Review of prior external notes (non-ED):   I reviewed primary care office visit from 5/15/2025.  Patient seen for lumbar radiculopathy.    Review of prior external test results outside of this encounter:  I reviewed a CMP dated 4/16/2025.  Creatinine 0.92, potassium 4.5    PAST MEDICAL HISTORY  Active Ambulatory Problems     Diagnosis Date Noted    Esophagitis 04/28/2016     Herniation of intervertebral disc at C5-C6 level 08/18/2021     Resolved Ambulatory Problems     Diagnosis Date Noted    No Resolved Ambulatory Problems     No Additional Past Medical History         PAST SURGICAL HISTORY  Past Surgical History:   Procedure Laterality Date    ANTERIOR CERVICAL DISCECTOMY W/ FUSION N/A 8/20/2021    Procedure: C5-6 ACDF;  Surgeon: Robert Bynum MD;  Location: [facility] OR;  Service: Neurosurgery;  Laterality: N/A;    WISDOM TOOTH EXTRACTION           FAMILY HISTORY  Family History   Problem Relation Age of Onset    Diabetes Mother     Hypertension Mother          SOCIAL HISTORY  Social History     Socioeconomic History    Marital status:    Tobacco Use    Smoking status: Former     Current packs/day: 0.00     Average packs/day: 0.3 packs/day for 20.0 years (5.0 ttl pk-yrs)     Types: Cigarettes     Start date: 2/1/2002     Quit date: 2/1/2022     Years since quitting: 3.2    Smokeless tobacco: Never   Vaping Use    Vaping status: Some Days    Substances: Nicotine, Flavoring    Devices: Pre-filled or refillable cartridge   Substance and Sexual Activity    Alcohol use: No    Drug use: No    Sexual activity: Yes         ALLERGIES  Patient has no known allergies.        REVIEW OF SYSTEMS  All systems reviewed and negative except for those discussed in HPI.       PHYSICAL EXAM    I have reviewed the triage vital signs and nursing notes.    ED Triage Vitals [05/20/25 0928]   Temp Heart Rate Resp BP SpO2   96.8 °F (36 °C) 84 18 -- 100 %      Temp src Heart Rate Source Patient Position BP Location FiO2 (%)   Tympanic Monitor -- -- --       Physical Exam  GENERAL: Alert, oriented, not distressed  HENT: head atraumatic, no nuchal rigidity  EYES: no scleral icterus, EOMI  CV: regular rhythm, regular rate, no murmur  RESPIRATORY: normal effort, CTA  ABDOMEN: soft, nontender  MUSCULOSKELETAL: Mildly decreased range of motion of lumbar spine.  Full range of motion of left hip, left  knee.  NEURO: Positive straight leg raise on the left at 30 degrees.  5/5 strength in bilateral lower extremities.  SKIN: warm, dry        LAB RESULTS  Recent Results (from the past 24 hours)   ECG 12 Lead Syncope    Collection Time: 05/20/25  9:53 AM   Result Value Ref Range    QT Interval 378 ms    QTC Interval 419 ms   Comprehensive Metabolic Panel    Collection Time: 05/20/25 10:06 AM    Specimen: Arm, Right; Blood   Result Value Ref Range    Glucose 122 (H) 65 - 99 mg/dL    BUN 13 6 - 20 mg/dL    Creatinine 0.95 0.76 - 1.27 mg/dL    Sodium 136 136 - 145 mmol/L    Potassium 4.2 3.5 - 5.2 mmol/L    Chloride 104 98 - 107 mmol/L    CO2 25.1 22.0 - 29.0 mmol/L    Calcium 9.3 8.6 - 10.5 mg/dL    Total Protein 6.7 6.0 - 8.5 g/dL    Albumin 4.4 3.5 - 5.2 g/dL    ALT (SGPT) 39 1 - 41 U/L    AST (SGOT) 28 1 - 40 U/L    Alkaline Phosphatase 61 39 - 117 U/L    Total Bilirubin 0.4 0.0 - 1.2 mg/dL    Globulin 2.3 gm/dL    A/G Ratio 1.9 g/dL    BUN/Creatinine Ratio 13.7 7.0 - 25.0    Anion Gap 6.9 5.0 - 15.0 mmol/L    eGFR 102.5 >60.0 mL/min/1.73   High Sensitivity Troponin T    Collection Time: 05/20/25 10:06 AM    Specimen: Arm, Right; Blood   Result Value Ref Range    HS Troponin T 6 <22 ng/L   CBC Auto Differential    Collection Time: 05/20/25 10:06 AM    Specimen: Arm, Right; Blood   Result Value Ref Range    WBC 9.54 3.40 - 10.80 10*3/mm3    RBC 4.69 4.14 - 5.80 10*6/mm3    Hemoglobin 14.6 13.0 - 17.7 g/dL    Hematocrit 43.1 37.5 - 51.0 %    MCV 91.9 79.0 - 97.0 fL    MCH 31.1 26.6 - 33.0 pg    MCHC 33.9 31.5 - 35.7 g/dL    RDW 12.5 12.3 - 15.4 %    RDW-SD 41.8 37.0 - 54.0 fl    MPV 8.6 6.0 - 12.0 fL    Platelets 305 140 - 450 10*3/mm3    Neutrophil % 86.8 (H) 42.7 - 76.0 %    Lymphocyte % 6.8 (L) 19.6 - 45.3 %    Monocyte % 4.4 (L) 5.0 - 12.0 %    Eosinophil % 0.9 0.3 - 6.2 %    Basophil % 0.7 0.0 - 1.5 %    Immature Grans % 0.4 0.0 - 0.5 %    Neutrophils, Absolute 8.27 (H) 1.70 - 7.00 10*3/mm3    Lymphocytes, Absolute  0.65 (L) 0.70 - 3.10 10*3/mm3    Monocytes, Absolute 0.42 0.10 - 0.90 10*3/mm3    Eosinophils, Absolute 0.09 0.00 - 0.40 10*3/mm3    Basophils, Absolute 0.07 0.00 - 0.20 10*3/mm3    Immature Grans, Absolute 0.04 0.00 - 0.05 10*3/mm3    nRBC 0.0 0.0 - 0.2 /100 WBC   D-dimer, Quantitative    Collection Time: 05/20/25 10:45 AM    Specimen: Blood   Result Value Ref Range    D-Dimer, Quantitative <0.27 0.00 - 0.50 MCGFEU/mL   High Sensitivity Troponin T 1Hr    Collection Time: 05/20/25 11:41 AM    Specimen: Arm, Left; Blood   Result Value Ref Range    HS Troponin T 6 <22 ng/L    Troponin T Numeric Delta 0 Abnormal if >/=3 ng/L       Ordered the above labs and independently reviewed the results.          MEDICATIONS GIVEN IN ER    Medications   dexAMETHasone (DECADRON) injection 4 mg (has no administration in time range)   gabapentin (NEURONTIN) capsule 300 mg (300 mg Oral Given 5/20/25 1332)   methocarbamol (ROBAXIN) tablet 500 mg (500 mg Oral Given 5/20/25 1330)   sodium chloride 0.9 % flush 10 mL (10 mL Intravenous Given 5/20/25 1331)   sodium chloride 0.9 % flush 10 mL (has no administration in time range)   sodium chloride 0.9 % infusion 40 mL (has no administration in time range)   Potassium Replacement - Follow Nurse / BPA Driven Protocol (has no administration in time range)   Magnesium Standard Dose Replacement - Follow Nurse / BPA Driven Protocol (has no administration in time range)   Phosphorus Replacement - Follow Nurse / BPA Driven Protocol (has no administration in time range)   Calcium Replacement - Follow Nurse / BPA Driven Protocol (has no administration in time range)   acetaminophen (TYLENOL) tablet 650 mg (has no administration in time range)     Or   acetaminophen (TYLENOL) 160 MG/5ML oral solution 650 mg (has no administration in time range)     Or   acetaminophen (TYLENOL) suppository 650 mg (has no administration in time range)   HYDROcodone-acetaminophen (NORCO) 5-325 MG per tablet 1 tablet (has  no administration in time range)   Lidocaine 4 % 2 patch (2 patches Transdermal Medication Applied 5/20/25 1331)   HYDROmorphone (DILAUDID) injection 0.5 mg (0.5 mg Intravenous Given 5/20/25 1331)     And   naloxone (NARCAN) injection 0.4 mg (has no administration in time range)   sennosides-docusate (PERICOLACE) 8.6-50 MG per tablet 2 tablet (has no administration in time range)     And   polyethylene glycol (MIRALAX) packet 17 g (has no administration in time range)     And   bisacodyl (DULCOLAX) EC tablet 5 mg (has no administration in time range)     And   bisacodyl (DULCOLAX) suppository 10 mg (has no administration in time range)   nitroglycerin (NITROSTAT) SL tablet 0.4 mg (has no administration in time range)   sodium chloride 0.9 % bolus 1,000 mL (1,000 mL Intravenous New Bag 5/20/25 1045)   dexAMETHasone (DECADRON) injection 10 mg (10 mg Intravenous Given 5/20/25 1330)         ADDITIONAL ORDERS CONSIDERED BUT NOT ORDERED:  Nothing    PROGRESS, DATA ANALYSIS, CONSULTS, AND MEDICAL DECISION MAKING    All labs have been independently interpreted by myself.  All radiology studies have been independently interpreted by myself and discussed with radiologist dictating the report.   EKGs independently interpreted by myself.  Discussion below represents my analysis of pertinent findings related to patient's condition, differential diagnosis, treatment plan and final disposition.    I have discussed case with Dr. Fagan, emergency room physician.  He has performed his own bedside examination and agrees with treatment plan.    ED Course as of 05/20/25 1526   Tue May 20, 2025   0957 Patient presents from home after having 2 syncopal episodes today secondary to pain in his low back.  Patient has had progressing pain in his back with radiation down the left leg.  He has been followed up with his primary care doctor however symptoms seem to be worsening.  No overt deficits on exam.  I will discuss with his neurosurgeon  after we have  completed his cardiac evaluation.   [EE]   1021 WBC: 9.54 [EE]   1021 Hemoglobin: 14.6 [EE]   1037 HS Troponin T: 6 [EE]   1136 I discussed the case with Eri Hudson, nurse practitioner with neurosurgery.  She will come to the ER to see the patient.  She agrees with plan for admission. [EE]   1140 I discussed the case with Annette Lehman, physician assistant in the observation unit.  She agrees to admit. [EE]   1140 Patient has a normal cardiac workup here.  I suspect his syncope was likely vasovagal.  He is alert and oriented now with stable vitals.  We will admit for pain control and neurosurgery consult. [EE]   1204 D-Dimer, Quant: <0.27 [MM]   1204 HS Troponin T: 6 [MM]      ED Course User Index  [EE] Alek Galan PA  [MM] Nilo Fagan MD       AS OF 15:26 EDT VITALS:    BP - 133/93  HR - 68  TEMP - 97.5 °F (36.4 °C) (Oral)  O2 SATS - 98%        DIAGNOSIS  Final diagnoses:   Vasovagal syncope   Left sided sciatica         DISPOSITION  Admitted      Dictated utilizing Dragon dictation     Alek Galan PA  05/20/25 [id number]

## 2025-05-20 NOTE — PROGRESS NOTES
Patient Care Team:  Epley, James, APRN as PCP - General (Family Medicine)     [facility]E [facility] H&P Note    I supervised care provided by the midlevel provider. We have discussed this patient's history, physical exam, and treatment plan. I have reviewed the midlevel provider's note and I agree with the midlevel provider's findings and plan of care.   SHARED VISIT: This visit was performed by BOTH a physician and an APC. The substantive portion of the medical decision making was performed by this attesting physician who made or approved the management plan and takes responsibility for patient management.   I have personally had a face to face encounter with the patient.   My personal findings are documented below:    History:  40-year-old male with prior cervical spine surgery presents with back pain with left radicular pain, no cauda equina symptoms.  Patient passed out secondary to the pain.    Physical Exam:  General: No acute distress.  HENT: NCAT, PERRL, Nares patent.  Eyes: no scleral icterus.  Neck: trachea midline, no ROM limitations.  CV: regular rhythm, regular rate.  Respiratory: normal effort, CTAB.  Abdomen: soft, nondistended, NTTP, no rebound tenderness, no guarding or rigidity.  Musculoskeletal: no deformity.  Neuro: alert, moves all extremities, follows commands.  Skin: warm, dry.    Assessment and Plan:  40-year-old male with history of prior cervical surgery presents with back pain.  Neurosurgery evaluated in the emergency department and recommended admission to the observation unit with plans for MRI lumbar spine.  Patient admitted, neurosurgery following, multimodal pain control, IV steroids, PT consulted.  Syncopal event sounds vasovagal in the setting of significant pain, cardiac workup negative, will monitor but do not believe needs further workup at present.

## 2025-05-20 NOTE — LETTER
May 22, 2025     Patient: Abdi Tyler   YOB: 1985   Date of Visit: 5/20/2025       To Whom It May Concern:    It is my medical opinion that Abdi Tyler  was hospitalized on 5/20/2025 to 5/22/2025 and may return to work as previously scheduled on light duty till further cleared by physical therapy or primary care physician .           Sincerely,        Annette Lehman PA-C    CC: No Recipients

## 2025-05-20 NOTE — CONSULTS
[facility] NEUROSURGERY CONSULT NOTE    Patient name: Abdi Tyler  Referring Provider: JONATHAN Galan  Reason for Consultation: Acute low back pain    Patient Care Team:  Epley, James, APRN as PCP - General (Family Medicine)    Chief complaint: Back pain    Subjective .     History of present illness:    Patient is a 40 y.o. male with history of previous ACDF now presents with acute low back pain associated with left lower extremity pain for the last 5 weeks.  Pain became so severe today he decided to come to the ED for further evaluation.  He had seen his PCP recently and was initiated on gabapentin, steroids and Celebrex without much relief. Pain in his left lower extremity begins in his buttock and goes down to his knee then to his calf down to his foot.  The foot pain is associated with some tingling.  At present he is unable to bear any weight. He denies any weakness or loss in bowel/bladder sensation. He denies any recent injury but works in a factory lifting heavy items.  He is not on any anticoagulation.    Review of Systems  Review of Systems   Constitutional:  Positive for activity change.   Musculoskeletal:  Positive for back pain and gait problem.       History  PAST MEDICAL HISTORY  No past medical history on file.    PAST SURGICAL HISTORY  Past Surgical History:   Procedure Laterality Date    ANTERIOR CERVICAL DISCECTOMY W/ FUSION N/A 8/20/2021    Procedure: C5-6 ACDF;  Surgeon: Robert Bynum MD;  Location: [facility];  Service: Neurosurgery;  Laterality: N/A;    WISDOM TOOTH EXTRACTION         FAMILY HISTORY  Family History   Problem Relation Age of Onset    Diabetes Mother     Hypertension Mother        SOCIAL HISTORY  Social History     Tobacco Use    Smoking status: Former     Current packs/day: 0.00     Average packs/day: 0.3 packs/day for 20.0 years (5.0 ttl pk-yrs)     Types: Cigarettes     Start date: 2/1/2002     Quit date: 2/1/2022     Years since quitting: 3.2    Smokeless tobacco:  Never   Vaping Use    Vaping status: Some Days    Substances: Nicotine, Flavoring    Devices: Pre-filled or refillable cartridge   Substance Use Topics    Alcohol use: No    Drug use: No     Allergies:  Patient has no known allergies.    MEDICATIONS:  (Not in a hospital admission)      No current facility-administered medications for this encounter.    Current Outpatient Medications:     celecoxib (CeleBREX) 200 MG capsule, Take 1 capsule by mouth 2 (Two) Times a Day. As needed for pain, lowest effective dose shortest time possible take with tall glass of water, Disp: 60 capsule, Rfl: 5    cyclobenzaprine (FLEXERIL) 5 MG tablet, Take 1-2 tablets by mouth At Night As Needed for Muscle Spasms. Or back pain, Disp: 45 tablet, Rfl: 2    gabapentin (NEURONTIN) 300 MG capsule, Take 1 capsule by mouth 3 (Three) Times a Day. (Titrate slowly as needed caution falls sedation possible), Disp: 45 capsule, Rfl: 0      Objective     Results Review:  LABS:  Results from last 7 days   Lab Units 05/20/25  1006   WBC 10*3/mm3 9.54   HEMOGLOBIN g/dL 14.6   HEMATOCRIT % 43.1   PLATELETS 10*3/mm3 305     Results from last 7 days   Lab Units 05/20/25  1006   SODIUM mmol/L 136   POTASSIUM mmol/L 4.2   CHLORIDE mmol/L 104   CO2 mmol/L 25.1   BUN mg/dL 13   CREATININE mg/dL 0.95   CALCIUM mg/dL 9.3   BILIRUBIN mg/dL 0.4   ALK PHOS U/L 61   ALT (SGPT) U/L 39   AST (SGOT) U/L 28   GLUCOSE mg/dL 122*         DIAGNOSTICS:  MRI pending     Results Review:   I reviewed the patient's new clinical results.  I personally viewed and interpreted the patient's chart    Vital Signs   Temp:  [96.8 °F (36 °C)] 96.8 °F (36 °C)  Heart Rate:  [84] 84  Resp:  [18] 18    Physical exam  Awake, alert, oriented x3  Pupils equal round reactive to light  Extraocular muscles intact  Face symmetric  Speech is fluent and clear  Motor exam  Bilateral deltoids 5/5, bilateral biceps 5/5, bilateral triceps 5/5, bilateral wrist extension 5/5 bilateral hand   "5/5  Bilateral hip flexion 5/5, bilateral knee extension 5/5, bilateral DF/PF 5/5  No clonus  No Ava's reflex  gait deferred  Able to detect  light touch in all 4 extremities      Assessment & Plan     Acute low back pain with       Problem List Items Addressed This Visit    None       COMORBID CONDITIONS:  No Comorbidities    40-year-old male with acute low back pain associated with left lower extremity pain.    PLAN:     Check MRI of the lumbar spine  Add IV steroids, scheduled Robaxin and continue gabapentin  Further recommendations after imaging is available    I discussed the patient's findings and my recommendations with patient and primary care team    I spent 46 minutes caring for Abdi Tyler on this date of service. This time includes time spent by me in the following activities: preparing for the visit, reviewing tests, obtaining and/or reviewing a separately obtained history, performing a medically appropriate examination and/or evaluation, counseling and educating the patient/family/caregiver, ordering medications, tests, or procedures, referring and communicating with other health care professionals, documenting information in the medical record, independently interpreting results and communicating that information with the patient/family/caregiver, and care coordination         Tara Hudson, APRN  05/20/25  11:38 EDT    \"Dictated utilizing Dragon dictation\".      "

## 2025-05-20 NOTE — ED NOTES
[facility]      HPI:   Renzo Haque is a 59year old male who presents for an Annual Health Visit. Patient has history of hypertension, factor V Leyden mutation.   Currently patient is taking his medications well along with dang Nursing report ED to floor  Abdi Tyler  40 y.o.  male    HPI :  HPI  Stated Reason for Visit: back pain going down L leg.  History Obtained From: patient    Chief Complaint  Chief Complaint   Patient presents with    Back Pain       Admitting doctor:   Asaf Burgess MD    Admitting diagnosis:   The primary encounter diagnosis was Vasovagal syncope. A diagnosis of Left sided sciatica was also pertinent to this visit.    Code status:   Current Code Status       Date Active Code Status Order ID Comments User Context       5/20/2025 1144 CPR (Attempt to Resuscitate) [id number]  Annette Lehman PA-C ED        Question Answer    Code Status (Patient has no pulse and is not breathing) CPR (Attempt to Resuscitate)    Medical Interventions (Patient has pulse or is breathing) Full Support    Level Of Support Discussed With Patient                    Allergies:   Patient has no known allergies.    Isolation:   No active isolations    Intake and Output  No intake or output data in the 24 hours ending 05/20/25 1237    Weight:   There were no vitals filed for this visit.    Most recent vitals:   Vitals:    05/20/25 0928 05/20/25 1145   BP:  131/92   Pulse: 84 64   Resp: 18    Temp: 96.8 °F (36 °C)    TempSrc: Tympanic    SpO2: 100% 98%       Active LDAs/IV Access:   Lines, Drains & Airways       Active LDAs       Name Placement date Placement time Site Days    Peripheral IV 05/20/25 1045 20 G Right Antecubital 05/20/25  1045  Antecubital  less than 1                    Labs (abnormal labs have a star):   Labs Reviewed   COMPREHENSIVE METABOLIC PANEL - Abnormal; Notable for the following components:       Result Value    Glucose 122 (*)     All other components within normal limits    Narrative:     GFR Categories in Chronic Kidney Disease (CKD)              GFR Category          GFR (mL/min/1.73)    Interpretation  G1                    90 or greater        Normal or high (1)  G2                    60-89                 Mild decrease (1)  G3a                   45-59                Mild to moderate decrease  G3b                   30-44                Moderate to severe decrease  G4                    15-29                Severe decrease  G5                    14 or less           Kidney failure    (1)In the absence of evidence of kidney disease, neither GFR category G1 or G2 fulfill the criteria for CKD.    eGFR calculation 2021 CKD-EPI creatinine equation, which does not include race as a factor   CBC WITH AUTO DIFFERENTIAL - Abnormal; Notable for the following components:    Neutrophil % 86.8 (*)     Lymphocyte % 6.8 (*)     Monocyte % 4.4 (*)     Neutrophils, Absolute 8.27 (*)     Lymphocytes, Absolute 0.65 (*)     All other components within normal limits   TROPONIN - Normal    Narrative:     High Sensitive Troponin T Reference Range:  <14.0 ng/L- Negative Female for AMI  <22.0 ng/L- Negative Male for AMI  >=14 - Abnormal Female indicating possible myocardial injury.  >=22 - Abnormal Male indicating possible myocardial injury.   Clinicians would have to utilize clinical acumen, EKG, Troponin, and serial changes to determine if it is an Acute Myocardial Infarction or myocardial injury due to an underlying chronic condition.        D-DIMER, QUANTITATIVE - Normal    Narrative:     According to the assay 's published package insert, a normal (<0.50 MCGFEU/mL) D-dimer result in conjunction with a non-high clinical probability assessment, excludes deep vein thrombosis (DVT) and pulmonary embolism (PE) with high sensitivity.    D-dimer values increase with age and this can make VTE exclusion of an older population difficult. To address this, the American College of Phys[employer], based on best available evidence and recent guidelines, recommends that clinicians use age-adjusted D-dimer thresholds in patients greater than 50 years of age with: a) a low probability of PE who do not meet all Pulmonary Embolism Rule Out Criteria,  Narrative      , 4 kids      Retired from construction       REVIEW OF SYSTEMS:   GENERAL HEALTH: feels well otherwise   SKIN: denies any unusual skin lesions or rashes  EYES: no visual complaints or deficits  HEENT: denies nasal congestion, sinus p "or b) in those with intermediate probability of PE.   The formula for an age-adjusted D-dimer cut-off is \"age/100\".  For example, a 60 year old patient would have an age-adjusted cut-off of 0.60 MCGFEU/mL and an 80 year old 0.80 MCGFEU/mL.   HIGH SENSITIVITIY TROPONIN T 1HR - Normal    Narrative:     High Sensitive Troponin T Reference Range:  <14.0 ng/L- Negative Female for AMI  <22.0 ng/L- Negative Male for AMI  >=14 - Abnormal Female indicating possible myocardial injury.  >=22 - Abnormal Male indicating possible myocardial injury.   Clinicians would have to utilize clinical acumen, EKG, Troponin, and serial changes to determine if it is an Acute Myocardial Infarction or myocardial injury due to an underlying chronic condition.        CBC AND DIFFERENTIAL    Narrative:     The following orders were created for panel order CBC & Differential.  Procedure                               Abnormality         Status                     ---------                               -----------         ------                     CBC Auto Differential[[id number]]        Abnormal            Final result                 Please view results for these tests on the individual orders.       EKG:   ECG 12 Lead Syncope   Preliminary Result   HEART RATE=74  bpm   RR Sdhuymrh=104  ms   WA Qxhpjbyn=678  ms   P Horizontal Axis=3  deg   P Front Axis=61  deg   QRSD Znksufmn=626  ms   QT Rgambwmc=754  ms   EXxG=246  ms   QRS Axis=30  deg   T Wave Axis=60  deg   - OTHERWISE NORMAL ECG -   Sinus rhythm   RSR' in V1 or V2, right VCD or RVH   Date and Time of Study:2025-05-20 09:53:43          Meds given in ED:   Medications   dexAMETHasone (DECADRON) injection 10 mg (has no administration in time range)   dexAMETHasone (DECADRON) injection 4 mg (has no administration in time range)   gabapentin (NEURONTIN) capsule 300 mg (has no administration in time range)   methocarbamol (ROBAXIN) tablet 500 mg (has no administration in time range)   sodium " nontender, nondistended; no HSM; no masses; no bruits.   GENITAL/: penis normal; no testicular masses; no inguinal hernias  RECTAL: Declined  LYMPHATIC: no lymphadenopathy  MUSCULOSKELETAL: no acute synovitis upper or lower extremity  EXTREMITIES: no cyan chloride 0.9 % flush 10 mL (has no administration in time range)   sodium chloride 0.9 % flush 10 mL (has no administration in time range)   sodium chloride 0.9 % infusion 40 mL (has no administration in time range)   Potassium Replacement - Follow Nurse / BPA Driven Protocol (has no administration in time range)   Magnesium Standard Dose Replacement - Follow Nurse / BPA Driven Protocol (has no administration in time range)   Phosphorus Replacement - Follow Nurse / BPA Driven Protocol (has no administration in time range)   Calcium Replacement - Follow Nurse / BPA Driven Protocol (has no administration in time range)   acetaminophen (TYLENOL) tablet 650 mg (has no administration in time range)     Or   acetaminophen (TYLENOL) 160 MG/5ML oral solution 650 mg (has no administration in time range)     Or   acetaminophen (TYLENOL) suppository 650 mg (has no administration in time range)   HYDROcodone-acetaminophen (NORCO) 5-325 MG per tablet 1 tablet (has no administration in time range)   Lidocaine 4 % 2 patch (has no administration in time range)   HYDROmorphone (DILAUDID) injection 0.5 mg (has no administration in time range)     And   naloxone (NARCAN) injection 0.4 mg (has no administration in time range)   sennosides-docusate (PERICOLACE) 8.6-50 MG per tablet 2 tablet (has no administration in time range)     And   polyethylene glycol (MIRALAX) packet 17 g (has no administration in time range)     And   bisacodyl (DULCOLAX) EC tablet 5 mg (has no administration in time range)     And   bisacodyl (DULCOLAX) suppository 10 mg (has no administration in time range)   nitroglycerin (NITROSTAT) SL tablet 0.4 mg (has no administration in time range)   sodium chloride 0.9 % bolus 1,000 mL (1,000 mL Intravenous New Bag 5/20/25 1045)       Imaging results:  No radiology results for the last day    Ambulatory status:   - up at tom    Social issues:   Social History     Socioeconomic History    Marital status:     Tobacco Use    Smoking status: Former     Current packs/day: 0.00     Average packs/day: 0.3 packs/day for 20.0 years (5.0 ttl pk-yrs)     Types: Cigarettes     Start date: 2/1/2002     Quit date: 2/1/2022     Years since quitting: 3.2    Smokeless tobacco: Never   Vaping Use    Vaping status: Some Days    Substances: Nicotine, Flavoring    Devices: Pre-filled or refillable cartridge   Substance and Sexual Activity    Alcohol use: No    Drug use: No    Sexual activity: Yes       Peripheral Neurovascular  Peripheral Neurovascular (Adult)  Peripheral Neurovascular WDL: WDL    Neuro Cognitive  Neuro Cognitive (Adult)  Cognitive/Neuro/Behavioral WDL: WDL  Additional Documentation: Schroeder Coma Scale (Group)  Schroeder Coma Scale  Best Eye Response: 4-->(E4) spontaneous  Best Motor Response: 6-->(M6) obeys commands  Best Verbal Response: 5-->(V5) oriented  Schroeder Coma Scale Score: 15    Learning  Learning Assessment  Learning Readiness and Ability: no barriers identified  Education Provided  Person Taught: patient    Respiratory  Respiratory  Airway WDL: WDL  Respiratory WDL  Respiratory WDL: WDL    Abdominal Pain       Pain Assessments  Pain (Adult)  (0-10) Pain Rating: Rest: 4  (0-10) Pain Rating: Activity: 4  Pain Location: back    NIH Stroke Scale       Chelsie Eaton RN  05/20/25 12:37 EDT

## 2025-05-20 NOTE — PROGRESS NOTES
Clinical Pharmacy Services: Medication History    Abdi Tyler is a 40 y.o. male presenting to [facility] for   Chief Complaint   Patient presents with    Back Pain       He  has no past medical history on file.    Allergies as of 05/20/2025    (No Known Allergies)       Medication information was obtained from: Pharmacy  Pharmacy and Phone Number:   [facility] [id number] - [place], [place] - [address] [facility] [phone] [facility] [phone] FX  [address] [facility] [address]  Phone: [phone] Fax: [phone]        Prior to Admission Medications       Prescriptions Last Dose Informant Patient Reported? Taking?    celecoxib (CeleBREX) 200 MG capsule  Pharmacy No Yes    Take 1 capsule by mouth 2 (Two) Times a Day. As needed for pain, lowest effective dose shortest time possible take with tall glass of water    cyclobenzaprine (FLEXERIL) 5 MG tablet  Pharmacy No Yes    Take 1-2 tablets by mouth At Night As Needed for Muscle Spasms. Or back pain    gabapentin (NEURONTIN) 300 MG capsule 5/20/2025  No Yes    Take 1 capsule by mouth 3 (Three) Times a Day. (Titrate slowly as needed caution falls sedation possible)              Medication notes:       This medication list is complete to the best of my knowledge as of 5/20/2025    Please call if questions.      Farhad Saxena  Medication History Technician  [phone]    5/20/2025 11:02 EDT

## 2025-05-20 NOTE — ED PROVIDER NOTES
I supervised care provided by the midlevel provider.   We have discussed this patient's history, physical exam, and treatment plan.  I have reviewed the note and personally saw and examined the patient and agree with the plan of care.   This is a gentleman who has been dealing with back pain for over 4 weeks.  He has been seeing his primary care physician and has been treated with 2 rounds of steroids, Celebrex and Neurontin with little improvement.  His pain is in his left lower lumbar buttocks area radiates that sometimes to the left testicle mainly down the posterior lateral aspect of his left upper portion of his lower extremity.  He denies any weakness, urinary fecal incontinence or retention, saddle anesthesia, fevers or chills.  Denies any abdominal pain.  Today he was having such bad pain he had 2 very brief episodes where he felt as if he blacked out.  He feels as if the episodes lasted a second.  He was sweating and was in severe pain when it occurred.  He had no chest pain or shortness of breath.  He had history of similar episode when he passed out when he had problems with his cervical spine and ultimately needed surgery done by Dr. Bynum in the past.  He has no history of heart or lung problems.  He is not on any blood thinning medicine.  Denies any swelling to his lower extremity.  His pain is worse when he tries to move especially get up raise his left leg tries to stand and walk and put weight on the left leg.    GENERAL: not distressed  HENT: nares patent  Head/neck/ face are symmetric without gross deformity or swelling  EYES: no scleral icterus  CV: regular rhythm, regular rate with intact distal pulses  RESPIRATORY: normal effort and no respiratory distress  ABDOMEN: soft and nontender good equal strong femoral pulses that are strong and symmetric.  On  exam he has no tenderness to his testicles bilaterally there is no swelling or deformity.  Back exam his location of pain is in the left  buttocks and goes down the lateral portion of his left upper portion of his lower extremity.  Normal saddle sensation  MUSCULOSKELETAL: no deformity.  Intact distal pulses  5/5 strength to hip flexion, knee extension/flexion, dorsiflexion, plantarflexion, and EHL.  No pain with bilateral external and internal rotation of hips.  Intact distal pulses with no cyanosis, pallor, or temperature change on palpation. Normal inspection and palpation with soft compartments.  SILT at bilateral superficial peroneal, deep peroneal, sural, and saphenous nerves  Straight leg positive on the left.  Crossover straight leg is negative  NEURO: alert and appropriate, moves all extremities, follows commands  SKIN: warm, dry    Vital signs and nursing notes reviewed.    Plan   ED Course as of 05/20/25 1545   Tue May 20, 2025   0957 Patient presents from home after having 2 syncopal episodes today secondary to pain in his low back.  Patient has had progressing pain in his back with radiation down the left leg.  He has been followed up with his primary care doctor however symptoms seem to be worsening.  No overt deficits on exam.  I will discuss with his neurosurgeon after we have  completed his cardiac evaluation.   [EE]   1021 WBC: 9.54 [EE]   1021 Hemoglobin: 14.6 [EE]   1037 HS Troponin T: 6 [EE]   1136 I discussed the case with Eri Hudson, nurse practitioner with neurosurgery.  She will come to the ER to see the patient.  She agrees with plan for admission. [EE]   1140 I discussed the case with Annette Lehman, physician assistant in the observation unit.  She agrees to admit. [EE]   1140 Patient has a normal cardiac workup here.  I suspect his syncope was likely vasovagal.  He is alert and oriented now with stable vitals.  We will admit for pain control and neurosurgery consult. [EE]   1204 D-Dimer, Quant: <0.27 [MM]   1204 HS Troponin T: 6 [MM]      ED Course User Index  [EE] Alek Galan PA  [MM] Nilo Fagan MD         This is  a patient that very likely has lumbar radiculopathy.  He has not had any imaging.  He has had no trauma or injury.  Will get an go ahead and give him some medicine for pain.  Ultimately anticipate this patient will need to be admitted to the observation unit for an MRI.  I believe the cause of passing out was very likely related to the pain.  Was having severe pain the episodes were incredibly brief and had some nausea and sweating with the episode.  His EKG that was done at 953 was independently interpreted by me.  His rate was 74 it is normal sinus mild intravelar conduction delay normal axis no acute abnormality seen  QT looks normal  No old EKG repair with         Nilo Fagan MD  05/20/25 4934

## 2025-05-20 NOTE — ED NOTES
Pt reports he has pinched nerve in his back. Pt states when he woke up the pain was severe and he passed out. Pt has numbness going down L leg. Pt denies bowel or bladder dysfunction.

## 2025-05-21 LAB
ALBUMIN SERPL-MCNC: 4.2 G/DL (ref 3.5–5.2)
ALBUMIN/GLOB SERPL: 1.8 G/DL
ALP SERPL-CCNC: 56 U/L (ref 39–117)
ALT SERPL W P-5'-P-CCNC: 34 U/L (ref 1–41)
ANION GAP SERPL CALCULATED.3IONS-SCNC: 9.1 MMOL/L (ref 5–15)
AST SERPL-CCNC: 21 U/L (ref 1–40)
BILIRUB SERPL-MCNC: 0.5 MG/DL (ref 0–1.2)
BUN SERPL-MCNC: 14 MG/DL (ref 6–20)
BUN/CREAT SERPL: 17.7 (ref 7–25)
CALCIUM SPEC-SCNC: 9 MG/DL (ref 8.6–10.5)
CHLORIDE SERPL-SCNC: 106 MMOL/L (ref 98–107)
CO2 SERPL-SCNC: 21.9 MMOL/L (ref 22–29)
CREAT SERPL-MCNC: 0.79 MG/DL (ref 0.76–1.27)
DEPRECATED RDW RBC AUTO: 44.6 FL (ref 37–54)
EGFRCR SERPLBLD CKD-EPI 2021: 113.7 ML/MIN/1.73
ERYTHROCYTE [DISTWIDTH] IN BLOOD BY AUTOMATED COUNT: 12.6 % (ref 12.3–15.4)
GLOBULIN UR ELPH-MCNC: 2.3 GM/DL
GLUCOSE SERPL-MCNC: 202 MG/DL (ref 65–99)
HCT VFR BLD AUTO: 45 % (ref 37.5–51)
HGB BLD-MCNC: 14.6 G/DL (ref 13–17.7)
MCH RBC QN AUTO: 30.8 PG (ref 26.6–33)
MCHC RBC AUTO-ENTMCNC: 32.4 G/DL (ref 31.5–35.7)
MCV RBC AUTO: 94.9 FL (ref 79–97)
PLATELET # BLD AUTO: 338 10*3/MM3 (ref 140–450)
PMV BLD AUTO: 9.1 FL (ref 6–12)
POTASSIUM SERPL-SCNC: 4.3 MMOL/L (ref 3.5–5.2)
PROT SERPL-MCNC: 6.5 G/DL (ref 6–8.5)
RBC # BLD AUTO: 4.74 10*6/MM3 (ref 4.14–5.8)
SODIUM SERPL-SCNC: 137 MMOL/L (ref 136–145)
WBC NRBC COR # BLD AUTO: 12.97 10*3/MM3 (ref 3.4–10.8)

## 2025-05-21 PROCEDURE — 25010000002 HYDROMORPHONE PER 4 MG: Performed by: EMERGENCY MEDICINE

## 2025-05-21 PROCEDURE — G0378 HOSPITAL OBSERVATION PER HR: HCPCS

## 2025-05-21 PROCEDURE — 99232 SBSQ HOSP IP/OBS MODERATE 35: CPT | Performed by: NURSE PRACTITIONER

## 2025-05-21 PROCEDURE — 96376 TX/PRO/DX INJ SAME DRUG ADON: CPT

## 2025-05-21 PROCEDURE — 25010000002 DEXAMETHASONE PER 1 MG: Performed by: NURSE PRACTITIONER

## 2025-05-21 PROCEDURE — 80053 COMPREHEN METABOLIC PANEL: CPT | Performed by: EMERGENCY MEDICINE

## 2025-05-21 PROCEDURE — 85027 COMPLETE CBC AUTOMATED: CPT | Performed by: EMERGENCY MEDICINE

## 2025-05-21 RX ADMIN — METHOCARBAMOL TABLETS 500 MG: 500 TABLET, COATED ORAL at 20:54

## 2025-05-21 RX ADMIN — GABAPENTIN 300 MG: 300 CAPSULE ORAL at 07:53

## 2025-05-21 RX ADMIN — DEXAMETHASONE SODIUM PHOSPHATE 4 MG: 4 INJECTION, SOLUTION INTRAMUSCULAR; INTRAVENOUS at 17:42

## 2025-05-21 RX ADMIN — HYDROMORPHONE HYDROCHLORIDE 0.5 MG: 1 INJECTION, SOLUTION INTRAMUSCULAR; INTRAVENOUS; SUBCUTANEOUS at 12:23

## 2025-05-21 RX ADMIN — Medication 10 ML: at 07:54

## 2025-05-21 RX ADMIN — HYDROMORPHONE HYDROCHLORIDE 0.5 MG: 1 INJECTION, SOLUTION INTRAMUSCULAR; INTRAVENOUS; SUBCUTANEOUS at 05:19

## 2025-05-21 RX ADMIN — DEXAMETHASONE SODIUM PHOSPHATE 4 MG: 4 INJECTION, SOLUTION INTRAMUSCULAR; INTRAVENOUS at 05:19

## 2025-05-21 RX ADMIN — LIDOCAINE 2 PATCH: 4 PATCH TOPICAL at 10:26

## 2025-05-21 RX ADMIN — HYDROMORPHONE HYDROCHLORIDE 0.5 MG: 1 INJECTION, SOLUTION INTRAMUSCULAR; INTRAVENOUS; SUBCUTANEOUS at 20:54

## 2025-05-21 RX ADMIN — HYDROCODONE BITARTRATE AND ACETAMINOPHEN 1 TABLET: 5; 325 TABLET ORAL at 07:54

## 2025-05-21 RX ADMIN — METHOCARBAMOL TABLETS 500 MG: 500 TABLET, COATED ORAL at 11:09

## 2025-05-21 RX ADMIN — Medication 10 ML: at 20:59

## 2025-05-21 RX ADMIN — HYDROCODONE BITARTRATE AND ACETAMINOPHEN 1 TABLET: 5; 325 TABLET ORAL at 15:45

## 2025-05-21 RX ADMIN — DEXAMETHASONE SODIUM PHOSPHATE 4 MG: 4 INJECTION, SOLUTION INTRAMUSCULAR; INTRAVENOUS at 00:13

## 2025-05-21 RX ADMIN — GABAPENTIN 300 MG: 300 CAPSULE ORAL at 20:54

## 2025-05-21 RX ADMIN — HYDROCODONE BITARTRATE AND ACETAMINOPHEN 1 TABLET: 5; 325 TABLET ORAL at 00:13

## 2025-05-21 RX ADMIN — DEXAMETHASONE SODIUM PHOSPHATE 4 MG: 4 INJECTION, SOLUTION INTRAMUSCULAR; INTRAVENOUS at 11:09

## 2025-05-21 RX ADMIN — METHOCARBAMOL TABLETS 500 MG: 500 TABLET, COATED ORAL at 17:42

## 2025-05-21 RX ADMIN — GABAPENTIN 300 MG: 300 CAPSULE ORAL at 15:48

## 2025-05-21 RX ADMIN — METHOCARBAMOL TABLETS 500 MG: 500 TABLET, COATED ORAL at 07:54

## 2025-05-21 RX ADMIN — DEXAMETHASONE SODIUM PHOSPHATE 4 MG: 4 INJECTION, SOLUTION INTRAMUSCULAR; INTRAVENOUS at 23:31

## 2025-05-21 NOTE — PLAN OF CARE
Goal Outcome Evaluation:         PT c/o back pain with activity overnight. Aox4, vss. Adlib. NPO since midnight to see neurosurgery in AM.  Pt has no further questions at this time

## 2025-05-21 NOTE — PROGRESS NOTES
MD ATTESTATION NOTE    The FRANCISCO and I have discussed this patient's history, physical exam, and treatment plan.  I have reviewed the documentation and personally had a face to face interaction with the patient. I affirm the documentation and agree with the treatment and plan.  The attached note describes my personal findings.      I provided a substantive portion of the care of the patient.  I personally performed the physical exam in its entirety, and below are my findings.     Brief HPI: This patient is a 40-year-old male to the observation unit due to left-sided low back pain with radiation down his left leg.  He reports that the pain remains fairly severe and worse with any kind of physical activity.  He denies fever/chills, leg weakness, urinary retention, groin numbness, or fecal incontinence.      PHYSICAL EXAM  ED Triage Vitals   Temp Heart Rate Resp BP SpO2   05/20/25 0928 05/20/25 0928 05/20/25 0928 05/20/25 1145 05/20/25 0928   96.8 °F (36 °C) 84 18 131/92 100 %      Temp src Heart Rate Source Patient Position BP Location FiO2 (%)   05/20/25 0928 05/20/25 0928 05/20/25 1321 05/20/25 1321 --   Tympanic Monitor Lying Right arm          GENERAL: Resting comfortably and in no acute distress, nontoxic in appearance  HENT: nares patent  EYES: no scleral icterus  CV: regular rhythm, normal rate, no M/R/G  RESPIRATORY: normal effort, lungs clear bilaterally  ABDOMEN: soft, nontender, no rebound or guarding  MUSCULOSKELETAL: no deformity, no edema  NEURO: alert, moves all extremities, follows commands  PSYCH:  calm, cooperative  SKIN: warm, dry    Vital signs and nursing notes reviewed.        Plan: The patient's MRI was concerning for a disc herniation at L4-5 with narrowing at the left S1 nerve root.  Neurosurgery has seen the patient this morning in consultation and is currently discussing treatment plans.  Further plan to follow.

## 2025-05-21 NOTE — PROGRESS NOTES
[facility] THORACIC/LUMBAR NEUROSURGERY PROGRESS NOTE      CC: Low back pain      Subjective     Interval History: No significant overnight events.  Reports no improvement in back pain.    ROS:  Constitutional: No fever, chills  MS: + low back pain  Neuro: No numbness, tingling, or weakness,  no balance difficulties  : No difficulty voiding, no incontinence    Objective     Vital signs in last 24 hours:  Temp:  [96.8 °F (36 °C)-98.2 °F (36.8 °C)] 97.6 °F (36.4 °C)  Heart Rate:  [62-84] 69  Resp:  [18] 18  BP: (126-140)/(70-98) 128/80    Intake/Output this shift:  No intake/output data recorded.    LABS:  Results from last 7 days   Lab Units 05/21/25  0309 05/20/25  1006   WBC 10*3/mm3 12.97* 9.54   HEMOGLOBIN g/dL 14.6 14.6   HEMATOCRIT % 45.0 43.1   PLATELETS 10*3/mm3 338 305     Results from last 7 days   Lab Units 05/21/25  0309 05/20/25  1006   SODIUM mmol/L 137 136   POTASSIUM mmol/L 4.3 4.2   CHLORIDE mmol/L 106 104   CO2 mmol/L 21.9* 25.1   BUN mg/dL 14 13   CREATININE mg/dL 0.79 0.95   CALCIUM mg/dL 9.0 9.3   BILIRUBIN mg/dL 0.5 0.4   ALK PHOS U/L 56 61   ALT (SGPT) U/L 34 39   AST (SGOT) U/L 21 28   GLUCOSE mg/dL 202* 122*         IMAGING STUDIES:  MRI Lumbar spine:    IMPRESSION:  1. The disc spaces and facets are normal in appearance with no canal or  foraminal narrowing from T11-L5.     2. At L5-S1, there is mild disc space narrowing, disc desiccation, and  there is a 4 mm retrolisthesis of L5 with respect to S1 with unroofed  disc material diffusely bulging along the posterior superior endplate of  S1. In addition, there is a left posterolateral disc-subarticular disc  herniation with extruded disc material extending inferiorly along the  left posterior superior body and endplate of S1 that maximally measures  6 x 5 x 8 mm in mediolateral, anterior posterior and craniocaudal  dimensions that narrows the left lateral recess and presses on the  anteromedial aspect of the traversing left S1 nerve root and  deviates it  posterolaterally and could account for an acute left S1 radiculopathy  and correlation with clinical exam is recommended. The remainder of  lumbar spine MRI is unremarkable    I personally viewed the patient's chart/imaging, it was also reviewed by and discussed with Dr Misa Patricia reviewed/changed: Yes  Decadron 4 mg IV every 6 hours  Neurontin 300 mg p.o. 3 times daily  Lidocaine patch every 24 hours  Robaxin 500 mg p.o. 4 times daily  Hydrocodone 5 mg 1 p.o. every 6 hours as needed  Dilaudid 0.5 mg IV every 4 hours as needed      Physical Exam:    General:  Awake & alert  Back: Midline lumbar tenderness to palpation.  Positive straight leg raise on the right at 30 degrees  Motor:  Normal muscle strength, bulk and tone in lower extremities.   Sensation:  Normal to light touch bilateral LE's  Station and Gait: Not tested due to pain      Assessment & Plan     ASSESSMENT:      Back pain    40-year-old male with history of previous ACDF presents with approximately 6-week history of low back pain.  The pain does radiate down the posterior aspect of his left leg into his calf.  Lumbar MRI showed disc herniation at L5-S1, no cord compression.  Patient does not report any improvement in pain since being in the hospital and on IV steroids.  Will plan for LESI and have him work with PT.    PLAN:   -LESI 5/22  -NPO pMN 5/22    I discussed the patient's findings and my recommendations with patient, family, primary care team, and Dr Bynum    I spent 40 minutes caring for Abdi Tyler on this date of service. This time includes time spent by me in the following activities: preparing for the visit, reviewing tests, obtaining and/or reviewing a separately obtained history, performing a medically appropriate examination and/or evaluation, counseling and educating the patient/family/caregiver, ordering medications, tests, or procedures, referring and communicating with other health care professionals,  "documenting information in the medical record, independently interpreting results and communicating that information with the patient/family/caregiver, and care coordination         During patient visit, I utilized appropriate personal protective equipment including gloves.  Appropriate PPE was worn during the entire visit.  Hand hygiene was completed before and after.      LOS: 0 days       Yesy Youngblood, APRN  5/21/2025  08:55 EDT    \"Dictated utilizing Dragon dictation\".      "

## 2025-05-21 NOTE — PLAN OF CARE
Goal Outcome Evaluation:     Pt is awake, AOX4, respirations are even and unlabored. Pt will be NPO at midnight for epidural tomorrow.

## 2025-05-21 NOTE — SIGNIFICANT NOTE
05/21/25 1102   OTHER   Discipline physical therapist;other (see comments)  (Pt currently up ad tom, observed antalgic gait quality. awaiting ESTEFANIA final recommendations. Pt provided w [facility] OP PT info and basic low back education/HEP. please re-order as needed pending medical course. will s/o at this time.)

## 2025-05-21 NOTE — PROGRESS NOTES
ED OBSERVATION PROGRESS/DISCHARGE SUMMARY    Date of Admission: 5/20/2025   LOS: 0 days   PCP: Epley, James, APRN      Working diagnosis: Acute lower back pain, herniated nucleus pulposus    Hospital Outcome:   40-year-old male with a past medical history of cervical disc bulge status post discectomy with fusion at C5-6 in 2021 who was admitted to the observation unit for further evaluation of progressively worsening low back pain with right radiculopathy.  Patient did report he passed out secondary to the pain.  High-sensitivity troponins negative and a D-dimer normal.  An EKG showed sinus rhythm with no acute ischemia.    Neurosurgery saw and evaluated the patient in the emergency department.  Patient was started on multimodal pain control and IV steroids.  MRI of the lumbar spine without shows L5-S1 4 mm retrolisthesis of L5 with respect S1 with unroofed disc material as well as a left posterior lateral disc herniation narrowing the left S1 nerve root.     Neurosurgery following.  PT consulted.    5:34 PM.  Neurosurgery is evaluated.  There is plan for LESI tomorrow.  They will continue to follow.  To reconvene after LESI      ROS:  General: no fevers, chills  Respiratory: no cough, dyspnea  Cardiovascular: no chest pain, palpitations  Abdomen: No abdominal pain, nausea, vomiting, or diarrhea  Neurologic: No focal weakness    Objective   Physical Exam:  I have reviewed the vital signs.  Temp:  [97.6 °F (36.4 °C)-98.2 °F (36.8 °C)] 97.6 °F (36.4 °C)  Heart Rate:  [69-76] 70  Resp:  [18] 18  BP: (126-132)/(70-80) 128/78  General Appearance:    Alert, cooperative, no distress  Head:    Normocephalic, atraumatic  Eyes:    Sclerae anicteric  Neck:   Supple, no mass  Lungs: Clear to auscultation bilaterally, respirations unlabored  Heart: Regular rate and rhythm, S1 and S2 normal, no murmur, rub or gallop  Abdomen:  Soft, nontender, bowel sounds active, nondistended  Extremities: No clubbing, cyanosis, or edema to  lower extremities  Pulses:  2+ and symmetric in distal lower extremities  Skin: No rashes   Neurologic: Oriented x3, Normal strength to extremities    Results Review:    I have reviewed the labs, radiology results and diagnostic studies.    Results from last 7 days   Lab Units 05/21/25  0309   WBC 10*3/mm3 12.97*   HEMOGLOBIN g/dL 14.6   HEMATOCRIT % 45.0   PLATELETS 10*3/mm3 338     Results from last 7 days   Lab Units 05/21/25  0309 05/20/25  1006   SODIUM mmol/L 137 136   POTASSIUM mmol/L 4.3 4.2   CHLORIDE mmol/L 106 104   CO2 mmol/L 21.9* 25.1   BUN mg/dL 14 13   CREATININE mg/dL 0.79 0.95   CALCIUM mg/dL 9.0 9.3   BILIRUBIN mg/dL 0.5 0.4   ALK PHOS U/L 56 61   ALT (SGPT) U/L 34 39   AST (SGOT) U/L 21 28   GLUCOSE mg/dL 202* 122*     Imaging Results (Last 24 Hours)       Procedure Component Value Units Date/Time    MRI Lumbar Spine Without Contrast [[id number]] Collected: 05/20/25 1642     Updated: 05/21/25 0614    Narrative:      MRI OF THE LUMBAR SPINE WITHOUT CONTRAST ON 05/20/2025     CLINICAL HISTORY: This is a 40-year-old male patient who complains of  low back pain that radiates down left leg with numbness and tingling for  5 weeks.     TECHNIQUE: Sagittal T1, proton density and fast spin echo T2 and  fat-saturated fast spin-echo T2 weighted images were obtained of the  lumbar spine. In addition axial T2 weighted images were obtained from  T12-S2 and thin-cut axial T1 and T2 weighted images were obtained angled  through the interspaces from L3-S1.     COMPARISON: There are no prior lumbar spine imaging studies from [facility] for comparison..     FINDINGS: The distal thoracic cord and the conus is normal in signal  intensity. The conus terminates at the superior body of the L1 lumbar  level which is normal.      At T12-L1, the disc space and facets are normal in appearance with no  canal or foraminal narrowing.      At L1-2, the disc space and facets are normal in appearance with  no  canal or foraminal narrowing.     At L2-3, the disc space and facets are normal in appearance with no  canal or foraminal narrowing.     At L3-4, the disc space and facets are normal in appearance with no  canal or foraminal narrowing.     At L4-5 the disc space is normal in appearance. There is minimal facet  overgrowth. There is no canal or foraminal narrowing.      At L5-S1, there is minimal facet overgrowth. There is mild disc space  narrowing, diffuse disc desiccation. There is a 3 to 4 mm retrolisthesis  of L5 with respect to S1. There is unroofed disc material diffusely  bulging along the posterior superior endplate of S1. In addition there  is a focal left posterolateral disc subarticular disc herniation with a  extruded disc extending inferiorly along the left posterior superior  body and endplate of S1 with extruded disc material measuring 6 x 5 x 8  mm in mediolateral, anterior, posterior and craniocaudal dimension and  presses on the anteromedial aspect of the traversing left S1 nerve root  and deviates it posterolaterally and could account for an acute left S1  radiculopathy. There is no canal or right lateral recess or foraminal  narrowing.       Impression:      1. The disc spaces and facets are normal in appearance with no canal or  foraminal narrowing from T11-L5.     2. At L5-S1, there is mild disc space narrowing, disc desiccation, and  there is a 4 mm retrolisthesis of L5 with respect to S1 with unroofed  disc material diffusely bulging along the posterior superior endplate of  S1. In addition, there is a left posterolateral disc-subarticular disc  herniation with extruded disc material extending inferiorly along the  left posterior superior body and endplate of S1 that maximally measures  6 x 5 x 8 mm in mediolateral, anterior posterior and craniocaudal  dimensions that narrows the left lateral recess and presses on the  anteromedial aspect of the traversing left S1 nerve root and deviates  it  posterolaterally and could account for an acute left S1 radiculopathy  and correlation with clinical exam is recommended. The remainder of  lumbar spine MRI is unremarkable. The results were communicated to  [facility] observation unit by telephone on 05/20/2025 at 4 pm.  The results were also communicated to Barbara Hudson from neurosurgery by  telephone on 05/20/2025 at 4:05 pm.     This report was finalized on 5/21/2025 6:11 AM by Dr. Andre Richard M.D  on Workstation: [id number]               I have reviewed the medications.     Discharge Medications        ASK your doctor about these medications        Instructions Start Date   celecoxib 200 MG capsule  Commonly known as: CeleBREX   200 mg, Oral, 2 Times Daily, As needed for pain, lowest effective dose shortest time possible take with tall glass of water      cyclobenzaprine 5 MG tablet  Commonly known as: FLEXERIL   5-10 mg, Oral, Nightly PRN, Or back pain      gabapentin 300 MG capsule  Commonly known as: NEURONTIN   300 mg, Oral, 3 Times Daily, (Titrate slowly as needed caution falls sedation possible)             ---------------------------------------------------------------------------------------------  Assessment & Plan   Assessment/Problem List    Back pain      Plan:    Acute back pain:  - MRI of the lumbar spine shows L5-S1 4 mm retrolisthesis of L5 with respect S1 with unroofed disc material as well as a left posterior lateral disc herniation narrowing the left S1 nerve root.   - Neurosurgery following  - IV steroids and multimodal pain control  - PT following  -LESI tomorrow  -N.p.o. midnight     Syncope:  - Suspicious for vasovagal episode secondary to pain  - Troponins and EKG negative; D-dimer negative  - No events on telemetry overnight    Disposition: Pending    Follow-up after Discharge: Pending    This note will serve as progress      52 minutes have been spent by [facility] providers in the care  of this patient while under observation status.    Appropriate PPE worn during patient encounter.  Hand hygeine performed before and after seeing the patient.      Fernandez Sims III, PA 05/21/25 17:36 EDT

## 2025-05-22 ENCOUNTER — APPOINTMENT (OUTPATIENT)
Dept: GENERAL RADIOLOGY | Facility: HOSPITAL | Age: 40
End: 2025-05-22
Payer: COMMERCIAL

## 2025-05-22 ENCOUNTER — ANESTHESIA EVENT (OUTPATIENT)
Dept: PAIN MEDICINE | Facility: HOSPITAL | Age: 40
End: 2025-05-22
Payer: COMMERCIAL

## 2025-05-22 ENCOUNTER — ANESTHESIA (OUTPATIENT)
Dept: PAIN MEDICINE | Facility: HOSPITAL | Age: 40
End: 2025-05-22
Payer: COMMERCIAL

## 2025-05-22 ENCOUNTER — APPOINTMENT (OUTPATIENT)
Dept: PAIN MEDICINE | Facility: HOSPITAL | Age: 40
End: 2025-05-22
Payer: COMMERCIAL

## 2025-05-22 ENCOUNTER — READMISSION MANAGEMENT (OUTPATIENT)
Dept: CALL CENTER | Facility: HOSPITAL | Age: 40
End: 2025-05-22
Payer: COMMERCIAL

## 2025-05-22 VITALS
TEMPERATURE: 97.6 F | BODY MASS INDEX: 26.68 KG/M2 | HEART RATE: 65 BPM | DIASTOLIC BLOOD PRESSURE: 89 MMHG | WEIGHT: 170 LBS | RESPIRATION RATE: 18 BRPM | SYSTOLIC BLOOD PRESSURE: 130 MMHG | OXYGEN SATURATION: 97 % | HEIGHT: 67 IN

## 2025-05-22 PROCEDURE — 25010000002 MIDAZOLAM PER 1 MG: Performed by: ANESTHESIOLOGY

## 2025-05-22 PROCEDURE — 25010000002 DEXAMETHASONE PER 1 MG: Performed by: NURSE PRACTITIONER

## 2025-05-22 PROCEDURE — 96376 TX/PRO/DX INJ SAME DRUG ADON: CPT

## 2025-05-22 PROCEDURE — G0378 HOSPITAL OBSERVATION PER HR: HCPCS

## 2025-05-22 PROCEDURE — 25010000002 METHYLPREDNISOLONE PER 80 MG: Performed by: ANESTHESIOLOGY

## 2025-05-22 PROCEDURE — 25510000001 IOPAMIDOL 41 % SOLUTION: Performed by: ANESTHESIOLOGY

## 2025-05-22 PROCEDURE — 25010000002 HYDROMORPHONE PER 4 MG: Performed by: EMERGENCY MEDICINE

## 2025-05-22 PROCEDURE — 77003 FLUOROGUIDE FOR SPINE INJECT: CPT

## 2025-05-22 PROCEDURE — 25010000002 FENTANYL CITRATE (PF) 50 MCG/ML SOLUTION: Performed by: ANESTHESIOLOGY

## 2025-05-22 RX ORDER — LIDOCAINE HYDROCHLORIDE 10 MG/ML
1 INJECTION, SOLUTION INFILTRATION; PERINEURAL ONCE
Status: DISCONTINUED | OUTPATIENT
Start: 2025-05-22 | End: 2025-05-22 | Stop reason: HOSPADM

## 2025-05-22 RX ORDER — MIDAZOLAM HYDROCHLORIDE 1 MG/ML
1 INJECTION, SOLUTION INTRAMUSCULAR; INTRAVENOUS ONCE AS NEEDED
Status: COMPLETED | OUTPATIENT
Start: 2025-05-22 | End: 2025-05-22

## 2025-05-22 RX ORDER — METHYLPREDNISOLONE 4 MG/1
TABLET ORAL
Qty: 21 TABLET | Refills: 0 | Status: SHIPPED | OUTPATIENT
Start: 2025-05-22

## 2025-05-22 RX ORDER — METHOCARBAMOL 500 MG/1
500 TABLET, FILM COATED ORAL 4 TIMES DAILY PRN
Qty: 30 TABLET | Refills: 0 | Status: SHIPPED | OUTPATIENT
Start: 2025-05-22

## 2025-05-22 RX ORDER — IOPAMIDOL 408 MG/ML
10 INJECTION, SOLUTION INTRATHECAL
Status: COMPLETED | OUTPATIENT
Start: 2025-05-22 | End: 2025-05-22

## 2025-05-22 RX ORDER — FENTANYL CITRATE 50 UG/ML
50 INJECTION, SOLUTION INTRAMUSCULAR; INTRAVENOUS ONCE
Status: COMPLETED | OUTPATIENT
Start: 2025-05-22 | End: 2025-05-22

## 2025-05-22 RX ORDER — METHYLPREDNISOLONE ACETATE 80 MG/ML
80 INJECTION, SUSPENSION INTRA-ARTICULAR; INTRALESIONAL; INTRAMUSCULAR; SOFT TISSUE ONCE
Status: COMPLETED | OUTPATIENT
Start: 2025-05-22 | End: 2025-05-22

## 2025-05-22 RX ADMIN — MIDAZOLAM 1 MG: 1 INJECTION INTRAMUSCULAR; INTRAVENOUS at 08:09

## 2025-05-22 RX ADMIN — METHYLPREDNISOLONE ACETATE 80 MG: 80 INJECTION, SUSPENSION INTRA-ARTICULAR; INTRALESIONAL; INTRAMUSCULAR; SOFT TISSUE at 08:11

## 2025-05-22 RX ADMIN — FENTANYL CITRATE 50 MCG: 50 INJECTION, SOLUTION INTRAMUSCULAR; INTRAVENOUS at 08:09

## 2025-05-22 RX ADMIN — HYDROMORPHONE HYDROCHLORIDE 0.5 MG: 1 INJECTION, SOLUTION INTRAMUSCULAR; INTRAVENOUS; SUBCUTANEOUS at 10:01

## 2025-05-22 RX ADMIN — Medication 10 ML: at 09:00

## 2025-05-22 RX ADMIN — DEXAMETHASONE SODIUM PHOSPHATE 4 MG: 4 INJECTION, SOLUTION INTRAMUSCULAR; INTRAVENOUS at 05:36

## 2025-05-22 RX ADMIN — IOPAMIDOL 10 ML: 408 INJECTION, SOLUTION INTRATHECAL at 08:11

## 2025-05-22 RX ADMIN — HYDROMORPHONE HYDROCHLORIDE 0.5 MG: 1 INJECTION, SOLUTION INTRAMUSCULAR; INTRAVENOUS; SUBCUTANEOUS at 04:36

## 2025-05-22 RX ADMIN — GABAPENTIN 300 MG: 300 CAPSULE ORAL at 08:59

## 2025-05-22 RX ADMIN — METHOCARBAMOL TABLETS 500 MG: 500 TABLET, COATED ORAL at 08:59

## 2025-05-22 NOTE — ANESTHESIA PROCEDURE NOTES
PAIN Epidural block    Pre-sedation assessment completed: 5/22/2025 8:06 AM    Patient reassessed immediately prior to procedure    Patient location during procedure: pain clinic  Start Time: 5/22/2025 8:08 AM  Stop Time: 5/22/2025 8:14 AM  Indication:at surgeon's request and procedure for pain  Performed By  Anesthesiologist: Christos Chow MD  Preanesthetic Checklist  Completed: patient identified, risks and benefits discussed, surgical consent, monitors and equipment checked, pre-op evaluation and timeout performed  Additional Notes  Post-Op Diagnosis Codes:     * Lumbar radiculopathy [M54.16]     * Osseous stenosis of neural canal of lumbar region [M99.33]     * Intervertebral disc disorders with radiculopathy, lumbar region [M51.16]    Prep:  Pt Position:prone  Sterile Tech:sterile barrier, mask and gloves  Prep:chlorhexidine gluconate and isopropyl alcohol  Monitoring:blood pressure monitoring, continuous pulse oximetry and EKG  Procedure:Sedation: no     Approach:left paramedian  Guidance: fluoroscopy  Location:lumbar  Level:L5-S1  Needle Type:Tuohy  Needle Gauge:20 G  Aspiration:negative  Test Dose:negative  Medications:  Isovue:2mL  Comments:Sedation time was 808 until 814  He had mild exacerbation upon entering the epidural space and with injection of steroid however that did somewhat ejrry soon afterDepomedrol:80mg  Post Assessment:  Pt Tolerance:patient tolerated the procedure well with no apparent complications  Complications:no

## 2025-05-22 NOTE — PROGRESS NOTES
MD ATTESTATION NOTE    The FRANCISCO and I have discussed this patient's history, physical exam, and treatment plan.  I have reviewed the documentation and personally had a face to face interaction with the patient. I affirm the documentation and agree with the treatment and plan.  The attached note describes my personal findings.      I provided a substantive portion of the care of the patient.  I personally performed the physical exam in its entirety, and below are my findings.       Brief HPI: This patient is a 40-year-old male admitted to the observation unit with intractable back pain with a concerning MRI showing a disc herniation at L4-5 narrowing the left S1 nerve root.  This morning, he does report improved symptoms with the pain medication however remains uncomfortable with movements.  He denies leg weakness, urinary retention, fecal incontinence, or groin numbness.      PHYSICAL EXAM  ED Triage Vitals   Temp Heart Rate Resp BP SpO2   05/20/25 0928 05/20/25 0928 05/20/25 0928 05/20/25 1145 05/20/25 0928   96.8 °F (36 °C) 84 18 131/92 100 %      Temp src Heart Rate Source Patient Position BP Location FiO2 (%)   05/20/25 0928 05/20/25 0928 05/20/25 1321 05/20/25 1321 --   Tympanic Monitor Lying Right arm          GENERAL: Resting comfortably and in no acute distress, nontoxic in appearance  HENT: nares patent  EYES: no scleral icterus  CV: regular rhythm, normal rate, no M/R/G  RESPIRATORY: normal effort, lungs clear bilaterally  ABDOMEN: soft, nontender, no rebound or guarding  MUSCULOSKELETAL: no deformity, no edema  NEURO: alert, moves all extremities, follows commands  PSYCH:  calm, cooperative  SKIN: warm, dry    Vital signs and nursing notes reviewed.        Plan: Neurosurgery has seen the patient in consultation and will take him this morning for LESI.  Reassessment and further treatment to follow.

## 2025-05-22 NOTE — H&P
[facility]    History and Physical    Patient Name: Abdi Tyler  :  1985  MRN:  [id number]  Date of Admission: 2025    Subjective     Patient is a 40 y.o. male presents with chief complaint of acute on chronic low back pain.  Onset of symptoms was abrupt starting a few days ago.  Symptoms are associated/aggravated by nothing in particular, activity, or standing. Symptoms improve with pain medication    The following portions of the patients history were reviewed and updated as appropriate: current medications, allergies, past medical history, past surgical history, past family history, past social history, and problem list    Reports low back pain with radicular symptoms down the posterior lateral aspect of his left leg.  He says he had some back pain in the past but not very severe but this brought him to the hospital.  He cannot really bear weight at this point in time.  He has had a dose of steroids IV.  He denies any bowel or bladder incontinence.  Neurosurgery is requesting an epidural steroid injection        MRI  IMPRESSION:  1. The disc spaces and facets are normal in appearance with no canal or  foraminal narrowing from T11-L5.     2. At L5-S1, there is mild disc space narrowing, disc desiccation, and  there is a 4 mm retrolisthesis of L5 with respect to S1 with unroofed  disc material diffusely bulging along the posterior superior endplate of  S1. In addition, there is a left posterolateral disc-subarticular disc  herniation with extruded disc material extending inferiorly along the  left posterior superior body and endplate of S1 that maximally measures  6 x 5 x 8 mm in mediolateral, anterior posterior and craniocaudal  dimensions that narrows the left lateral recess and presses on the  anteromedial aspect of the traversing left S1 nerve root and deviates it  posterolaterally and could account for an acute left S1 radiculopathy  and correlation with clinical exam is recommended. The  "remainder of  lumbar spine MRI is unremarkable. The results were communicated to  [facility] observation unit by telephone on 05/20/2025 at 4 pm.  The results were also communicated to Barbara Hudson from neurosurgery by  telephone on 05/20/2025 at 4:05 pm.     Objective     Past Medical History: History reviewed. No pertinent past medical history.  Past Surgical History:   Past Surgical History:   Procedure Laterality Date   • ANTERIOR CERVICAL DISCECTOMY W/ FUSION N/A 8/20/2021    Procedure: C5-6 ACDF;  Surgeon: Robert Bynum MD;  Location: [facility];  Service: Neurosurgery;  Laterality: N/A;   • WISDOM TOOTH EXTRACTION       Family History:   Family History   Problem Relation Age of Onset   • Diabetes Mother    • Hypertension Mother      Social History:   Social History     Socioeconomic History   • Marital status:    Tobacco Use   • Smoking status: Former     Current packs/day: 0.00     Average packs/day: 0.3 packs/day for 20.0 years (5.0 ttl pk-yrs)     Types: Cigarettes     Start date: 2/1/2002     Quit date: 2/1/2022     Years since quitting: 3.3   • Smokeless tobacco: Never   Vaping Use   • Vaping status: Some Days   • Substances: Nicotine, Flavoring   • Devices: Pre-filled or refillable cartridge   Substance and Sexual Activity   • Alcohol use: No   • Drug use: No   • Sexual activity: Yes       Vital Signs Range for the last 24 hours  Temperature: Temp:  [36.4 °C (97.6 °F)] 36.4 °C (97.6 °F)   Temp Source: Temp src: Oral   BP: BP: (122-133)/(73-87) 133/87   Pulse: Heart Rate:  [69-70] 70   Respirations: Resp:  [18] 18   SPO2: SpO2:  [98 %] 98 %   O2 Amount (l/min):     O2 Devices Device (Oxygen Therapy): room air   Weight:       Flowsheet Rows      Flowsheet Row First Filed Value   Admission Height 170.2 cm (67\") Documented at 05/20/2025 1321   Admission Weight 77.1 kg (170 lb) Documented at 05/20/2025 1142      "       --------------------------------------------------------------------------------    Current Facility-Administered Medications   Medication Dose Route Frequency Provider Last Rate Last Admin   • acetaminophen (TYLENOL) tablet 650 mg  650 mg Oral Q4H PRAsaf Sharma MD        Or   • acetaminophen (TYLENOL) 160 MG/5ML oral solution 650 mg  650 mg Oral Q4H PRAsaf Sharma MD        Or   • acetaminophen (TYLENOL) suppository 650 mg  650 mg Rectal Q4H PRAsaf Sharma MD       • sennosides-docusate (PERICOLACE) 8.6-50 MG per tablet 2 tablet  2 tablet Oral BID PRN Asaf Burgess MD        And   • polyethylene glycol (MIRALAX) packet 17 g  17 g Oral Daily PRAsaf Sharma MD        And   • bisacodyl (DULCOLAX) EC tablet 5 mg  5 mg Oral Daily PRN Asaf Burgess MD   5 mg at 05/20/25 1605    And   • bisacodyl (DULCOLAX) suppository 10 mg  10 mg Rectal Daily PRAsaf Sharma MD       • Calcium Replacement - Follow Nurse / BPA Driven Protocol   Not Applicable PRAsaf Sharma MD       • dexAMETHasone (DECADRON) injection 4 mg  4 mg Intravenous Q6H Tara Hudson APRN   4 mg at 05/22/25 0536   • gabapentin (NEURONTIN) capsule 300 mg  300 mg Oral TID Tara Hudson APRN   300 mg at 05/21/25 2054   • HYDROcodone-acetaminophen (NORCO) 5-325 MG per tablet 1 tablet  1 tablet Oral Q6H PRAsaf Sharma MD   1 tablet at 05/21/25 1545   • HYDROmorphone (DILAUDID) injection 0.5 mg  0.5 mg Intravenous Q4H PRAsaf Sharma MD   0.5 mg at 05/22/25 0436    And   • naloxone (NARCAN) injection 0.4 mg  0.4 mg Intravenous Q5 Min PRN Asaf Burgess MD       • Lidocaine 4 % 2 patch  2 patch Transdermal Q24H Asaf Burgess MD   2 patch at 05/21/25 1026   • Magnesium Standard Dose Replacement - Follow Nurse / BPA Driven Protocol   Not Applicable PRN Asaf Burgess MD       • methocarbamol (ROBAXIN) tablet 500 mg  500 mg Oral 4x Daily Tara Hudson, APRN    "500 mg at 05/21/25 2054   • nitroglycerin (NITROSTAT) SL tablet 0.4 mg  0.4 mg Sublingual Q5 Min PRAsaf Sharma MD       • Phosphorus Replacement - Follow Nurse / BPA Driven Protocol   Not Applicable Asaf Schmidt MD       • Potassium Replacement - Follow Nurse / BPA Driven Protocol   Not Applicable Asaf Schmidt MD       • sodium chloride 0.9 % flush 10 mL  10 mL Intravenous Q12H Asaf Burgess MD   10 mL at 05/21/25 2059   • sodium chloride 0.9 % flush 10 mL  10 mL Intravenous PRN Asaf Burgess MD       • sodium chloride 0.9 % infusion 40 mL  40 mL Intravenous Asaf Schmidt MD           --------------------------------------------------------------------------------  Assessment & Plan      Anesthesia Evaluation           Pain impairs ability to perform ADLs: Ambulation, Exercise/Activity, Housekeeping and Working  Modalities previously tried to control pain with limited effectiveness within the last 4-6 weeks: Rest, OTC medications and Steroids     Airway   Mallampati: II  Neck ROM: limited  Dental      Pulmonary    (-) wheezes  Cardiovascular     Rhythm: regular    (-) murmur    PE comment: Dorsal pedal pulses are palpable bilaterally    Neuro/Psych  (+)   left straight leg raise test  GI/Hepatic/Renal/Endo      Musculoskeletal         PE comment: Strength was not tested due to guarding from pain.  He says he is able to ambulate but it is very painful  Abdominal    Substance History      OB/GYN          Other                 Diagnosis and Plan    Treatment Plan  ASA 2      Procedures: Lumbar Epidural Steroid Injection(LESI), With fluoroscopy,          Discussed with patient risk and benefits of GUSTAVO including but not limited to : Bleeding, infection, PDPH, inadvertant spinal anesthetic, worsening pain, hyperglycemia, hypertension, CHF, nerve damage, steroid \"toxicity\" and AVN of hips.  He understands steroids only stop inflammation and will not address any inflammatory " causes of pain.  He understands this may take 24 to 48 hours to start working and it may initially exacerbate his pain briefly.  Pt agrees to proceed.  Diagnosis     * Lumbar radiculopathy [M54.16]     * Osseous stenosis of neural canal of lumbar region [M99.33]

## 2025-05-22 NOTE — PLAN OF CARE
Goal Outcome Evaluation:     Pt is awake, AOX4, respirations are even and unlabored. Pt provided with work note, written and verbal discharge instructions. Belongings to be sent with patient. Patient to discharge with family member via private vehicle.

## 2025-05-22 NOTE — PLAN OF CARE
Goal Outcome Evaluation:      Pt c/o pain overnight. Aox4, vss. NPO since midnight for epidural in AM. Pt has no further questions at this time

## 2025-05-22 NOTE — CASE MANAGEMENT/SOCIAL WORK
Case Management Discharge Note      Final Note: Home via private vehicle         Selected Continued Care - Discharged on 5/22/2025 Admission date: 5/20/2025 - Discharge disposition: Home or Self Care      Destination    No services have been selected for the patient.                Durable Medical Equipment    No services have been selected for the patient.                Dialysis/Infusion    No services have been selected for the patient.                Home Medical Care    No services have been selected for the patient.                Therapy    No services have been selected for the patient.                Community Resources    No services have been selected for the patient.                Community & DME    No services have been selected for the patient.                    Transportation Services  Private: Car    Final Discharge Disposition Code: 01 - home or self-care

## 2025-05-22 NOTE — DISCHARGE INSTRUCTIONS
EPIDURAL STEROID INJECTION          An epidural steroid injection is a shot of steroid medicine and numbing medicine that is given into the space between the spinal cord and the bones of the back (epidural space).  The injection helps relieve pain by an irritated or swollen nerve root.    TELL YOUR HEALTH CARE PROVIDER ABOUT:  Any allergies you have  All medicines you are taking including any over the counter medicines  Any blood disorders you have  Any surgeries you have had  Any medical conditions you have  Whether you are pregnant or may be pregnant    WHAT ARE THE RISK?  Generally, this is a safe procedure. However,problems may occur, including  Headache  Bleeding  Infection  Allergic Reaction  Nerve Damage    WHAT CAN I EXPECT AFTER THE PROCEDURE?    INJECTION SITE  Remove the Band-Aid/s after 24 hours  Check your injection site every day for signs of infection.  Check for:             Redness             Bleeding (small amt is normal)             Warmth             Pus or bad odor  Some numbness may be experienced for several hours following the procedure.  Avoid using heat on the injection site for 24 hours. You may use ice intermittently if needed by placing a         towel between your skin and the ice bag and using the ice for 20 minutes 2-3 times a day.  Do not take baths, swim or use a hot tub for 24 hours.    ACTIVITY  No strenuous activity for 24 hours then return to normal activity as tolerated.  If your leg is numb, no driving until full sensation and strength has returned.    GENERAL INSTRUCTIONS:  The injection site may feel numb, use ice with caution if numbness is present and no heat for 24 hours or until numbness is gone.   If you have numbness or weakness in your arm or leg, use those areas with caution until normal sensation returns.  It is not uncommon to notice an increase in discomfort or a change in the location of discomfort for 3-4 days after the procedure.  If discomfort is noticed  at the injection site, ice may be            applied to that area for 20 min 2-3 times a day.  Take the pain medicine your physician has prescribed or over the counter pain relievers as long as you do not have any contraindications.  If you are a diabetic, monitor your blood sugar closely.  The steroids used in your procedure may increase your blood sugar level up to 36 hours after the injection.  If your blood sugar is greater than 250, call the physician that helps you monitor your blood sugar.  Keep all follow-up visits as scheduled by your health care provider. This is important.    CONTACT OUR OFFICE IF:  You have any of these signs of infection            -Redness, swelling, or warmth around your injection site.            -Fluid or blood coming from your injection site (small amt of blood is normal)            -Pus or a bad odor from your injection site            -A fever  You develop a severe headache or a stiff neck  You lose control of your bladder or bowel movements      [facility]NA[facility]EMENT [facility] HOURS   Monday-Friday 7:30 am. - 4:00 pm.  For any problem related to your procedure we can be reached at [phone].  If you experience an emergency with your procedure, call [phone] or go to the emergency room.    Back Exercises  These exercises help to make your trunk and back strong. They also help to keep the lower back flexible. Doing these exercises can help to prevent or lessen pain in your lower back.  If you have back pain, try to do these exercises 2-3 times each day or as told by your doctor.  As you get better, do the exercises once each day. Repeat the exercises more often as told by your doctor.  To stop back pain from coming back, do the exercises once each day, or as told by your doctor.  Do exercises exactly as told by your doctor. Stop right away if you feel sudden pain or your pain gets worse.  Exercises  Single knee to chest  Do these steps 3-5 times in a row for each leg:  Lie on your  back on a firm bed or the floor with your legs stretched out.  Bring one knee to your chest.  Grab your knee or thigh with both hands and hold it in place.  Pull on your knee until you feel a gentle stretch in your lower back or butt.  Keep doing the stretch for 10-30 seconds.  Slowly let go of your leg and straighten it.  Pelvic tilt  Do these steps 5-10 times in a row:  Lie on your back on a firm bed or the floor with your legs stretched out.  Bend your knees so they point up to the ceiling. Your feet should be flat on the floor.  Tighten your lower belly (abdomen) muscles to press your lower back against the floor. This will make your tailbone point up to the ceiling instead of pointing down to your feet or the floor.  Stay in this position for 5-10 seconds while you gently tighten your muscles and breathe evenly.  Cat-cow  Do these steps until your lower back bends more easily:  Get on your hands and knees on a firm bed or the floor. Keep your hands under your shoulders, and keep your knees under your hips. You may put padding under your knees.  Let your head hang down toward your chest. Tighten (contract) the muscles in your belly. Point your tailbone toward the floor so your lower back becomes rounded like the back of a cat.  Stay in this position for 5 seconds.  Slowly lift your head. Let the muscles of your belly relax. Point your tailbone up toward the ceiling so your back forms a sagging arch like the back of a cow.  Stay in this position for 5 seconds.     Press-ups  Do these steps 5-10 times in a row:  Lie on your belly (face-down) on a firm bed or the floor.  Place your hands near your head, about shoulder-width apart.  While you keep your back relaxed and keep your hips on the floor, slowly straighten your arms to raise the top half of your body and lift your shoulders. Do not use your back muscles. You may change where you place your hands to make yourself more comfortable.  Stay in this position for  5 seconds. Keep your back relaxed.  Slowly return to lying flat on the floor.     Bridges  Do these steps 10 times in a row:  Lie on your back on a firm bed or the floor.  Bend your knees so they point up to the ceiling. Your feet should be flat on the floor. Your arms should be flat at your sides, next to your body.  Tighten your butt muscles and lift your butt off the floor until your waist is almost as high as your knees. If you do not feel the muscles working in your butt and the back of your thighs, slide your feet 1-2 inches (2.5-5 cm) farther away from your butt.  Stay in this position for 3-5 seconds.  Slowly lower your butt to the floor, and let your butt muscles relax.  If this exercise is too easy, try doing it with your arms crossed over your chest.  Belly crunches  Do these steps 5-10 times in a row:  Lie on your back on a firm bed or the floor with your legs stretched out.  Bend your knees so they point up to the ceiling. Your feet should be flat on the floor.  Cross your arms over your chest.  Tip your chin a little bit toward your chest, but do not bend your neck.  Tighten your belly muscles and slowly raise your chest just enough to lift your shoulder blades a tiny bit off the floor. Avoid raising your body higher than that because it can put too much stress on your lower back.  Slowly lower your chest and your head to the floor.  Back lifts  Do these steps 5-10 times in a row:  Lie on your belly (face-down) with your arms at your sides, and rest your forehead on the floor.  Tighten the muscles in your legs and your butt.  Slowly lift your chest off the floor while you keep your hips on the floor. Keep the back of your head in line with the curve in your back. Look at the floor while you do this.  Stay in this position for 3-5 seconds.  Slowly lower your chest and your face to the floor.  Contact a doctor if:  Your back pain gets a lot worse when you do an exercise.  Your back pain does not get  better within 2 hours after you exercise.  If you have any of these problems, stop doing the exercises. Do not do them again unless your doctor says it is okay.  Get help right away if:  You have sudden, very bad back pain. If this happens, stop doing the exercises. Do not do them again unless your doctor says it is okay.  This information is not intended to replace advice given to you by your health care provider. Make sure you discuss any questions you have with your health care provider.  Document Revised: 03/02/2022 Document Reviewed: 03/02/2022  [employer] Patient Education © 2024 [employer]    What is Box?  [employer] is a fitness and wellness program offered at no additional cost to seniors 65+ on eligible Medicare plans that helps you get active, get fit, and connect with others.  The program is designed for all levels and abilities and provides access to online and in-person classes, over 15,000 fitness locations, and health & wellness discounts.  Before starting any exercise program, consult with your primary care provider.  For additional information and to check eligibility:  tools[employer]                      EPIDURAL STEROID INJECTION          An epidural steroid injection is a shot of steroid medicine and numbing medicine that is given into the space between the spinal cord and the bones of the back (epidural space).  The injection helps relieve pain by an irritated or swollen nerve root.    TELL YOUR HEALTH CARE PROVIDER ABOUT:  Any allergies you have  All medicines you are taking including any over the counter medicines  Any blood disorders you have  Any surgeries you have had  Any medical conditions you have  Whether you are pregnant or may be pregnant    WHAT ARE THE RISK?  Generally, this is a safe procedure. However,problems may occur, including  Headache  Bleeding  Infection  Allergic Reaction  Nerve Damage    WHAT CAN I EXPECT AFTER THE PROCEDURE?    INJECTION  SITE  Remove the Band-Aid/s after 24 hours  Check your injection site every day for signs of infection.  Check for:             Redness             Bleeding (small amt is normal)             Warmth             Pus or bad odor  Some numbness may be experienced for several hours following the procedure.  Avoid using heat on the injection site for 24 hours. You may use ice intermittently if needed by placing a         towel between your skin and the ice bag and using the ice for 20 minutes 2-3 times a day.  Do not take baths, swim or use a hot tub for 24 hours.    ACTIVITY  No strenuous activity for 24 hours then return to normal activity as tolerated.  If your leg is numb, no driving until full sensation and strength has returned.    GENERAL INSTRUCTIONS:  The injection site may feel numb, use ice with caution if numbness is present and no heat for 24 hours or until numbness is gone.   If you have numbness or weakness in your arm or leg, use those areas with caution until normal sensation returns.  It is not uncommon to notice an increase in discomfort or a change in the location of discomfort for 3-4 days after the procedure.  If discomfort is noticed at the injection site, ice may be            applied to that area for 20 min 2-3 times a day.  Take the pain medicine your physician has prescribed or over the counter pain relievers as long as you do not have any contraindications.  If you are a diabetic, monitor your blood sugar closely.  The steroids used in your procedure may increase your blood sugar level up to 36 hours after the injection.  If your blood sugar is greater than 250, call the physician that helps you monitor your blood sugar.  Keep all follow-up visits as scheduled by your health care provider. This is important.    CONTACT OUR OFFICE IF:  You have any of these signs of infection            -Redness, swelling, or warmth around your injection site.            -Fluid or blood coming from your  injection site (small amt of blood is normal)            -Pus or a bad odor from your injection site            -A fever  You develop a severe headache or a stiff neck  You lose control of your bladder or bowel movements      PAIN [facility]NAGEMENT [facility]ENTER HOURS   Monday-Friday 7:30 am. - 4:00 pm.  For any problem related to your procedure we can be reached at [phone].  If you experience an emergency with your procedure, call [phone] or go to the emergency room.    Back Exercises  These exercises help to make your trunk and back strong. They also help to keep the lower back flexible. Doing these exercises can help to prevent or lessen pain in your lower back.  If you have back pain, try to do these exercises 2-3 times each day or as told by your doctor.  As you get better, do the exercises once each day. Repeat the exercises more often as told by your doctor.  To stop back pain from coming back, do the exercises once each day, or as told by your doctor.  Do exercises exactly as told by your doctor. Stop right away if you feel sudden pain or your pain gets worse.  Exercises  Single knee to chest  Do these steps 3-5 times in a row for each leg:  Lie on your back on a firm bed or the floor with your legs stretched out.  Bring one knee to your chest.  Grab your knee or thigh with both hands and hold it in place.  Pull on your knee until you feel a gentle stretch in your lower back or butt.  Keep doing the stretch for 10-30 seconds.  Slowly let go of your leg and straighten it.  Pelvic tilt  Do these steps 5-10 times in a row:  Lie on your back on a firm bed or the floor with your legs stretched out.  Bend your knees so they point up to the ceiling. Your feet should be flat on the floor.  Tighten your lower belly (abdomen) muscles to press your lower back against the floor. This will make your tailbone point up to the ceiling instead of pointing down to your feet or the floor.  Stay in this position for 5-10 seconds  while you gently tighten your muscles and breathe evenly.  Cat-cow  Do these steps until your lower back bends more easily:  Get on your hands and knees on a firm bed or the floor. Keep your hands under your shoulders, and keep your knees under your hips. You may put padding under your knees.  Let your head hang down toward your chest. Tighten (contract) the muscles in your belly. Point your tailbone toward the floor so your lower back becomes rounded like the back of a cat.  Stay in this position for 5 seconds.  Slowly lift your head. Let the muscles of your belly relax. Point your tailbone up toward the ceiling so your back forms a sagging arch like the back of a cow.  Stay in this position for 5 seconds.     Press-ups  Do these steps 5-10 times in a row:  Lie on your belly (face-down) on a firm bed or the floor.  Place your hands near your head, about shoulder-width apart.  While you keep your back relaxed and keep your hips on the floor, slowly straighten your arms to raise the top half of your body and lift your shoulders. Do not use your back muscles. You may change where you place your hands to make yourself more comfortable.  Stay in this position for 5 seconds. Keep your back relaxed.  Slowly return to lying flat on the floor.     Bridges  Do these steps 10 times in a row:  Lie on your back on a firm bed or the floor.  Bend your knees so they point up to the ceiling. Your feet should be flat on the floor. Your arms should be flat at your sides, next to your body.  Tighten your butt muscles and lift your butt off the floor until your waist is almost as high as your knees. If you do not feel the muscles working in your butt and the back of your thighs, slide your feet 1-2 inches (2.5-5 cm) farther away from your butt.  Stay in this position for 3-5 seconds.  Slowly lower your butt to the floor, and let your butt muscles relax.  If this exercise is too easy, try doing it with your arms crossed over your  chest.  Belly crunches  Do these steps 5-10 times in a row:  Lie on your back on a firm bed or the floor with your legs stretched out.  Bend your knees so they point up to the ceiling. Your feet should be flat on the floor.  Cross your arms over your chest.  Tip your chin a little bit toward your chest, but do not bend your neck.  Tighten your belly muscles and slowly raise your chest just enough to lift your shoulder blades a tiny bit off the floor. Avoid raising your body higher than that because it can put too much stress on your lower back.  Slowly lower your chest and your head to the floor.  Back lifts  Do these steps 5-10 times in a row:  Lie on your belly (face-down) with your arms at your sides, and rest your forehead on the floor.  Tighten the muscles in your legs and your butt.  Slowly lift your chest off the floor while you keep your hips on the floor. Keep the back of your head in line with the curve in your back. Look at the floor while you do this.  Stay in this position for 3-5 seconds.  Slowly lower your chest and your face to the floor.  Contact a doctor if:  Your back pain gets a lot worse when you do an exercise.  Your back pain does not get better within 2 hours after you exercise.  If you have any of these problems, stop doing the exercises. Do not do them again unless your doctor says it is okay.  Get help right away if:  You have sudden, very bad back pain. If this happens, stop doing the exercises. Do not do them again unless your doctor says it is okay.  This information is not intended to replace advice given to you by your health care provider. Make sure you discuss any questions you have with your health care provider.  Document Revised: 03/02/2022 Document Reviewed: 03/02/2022  [employer] Patient Education © 2024 [employer]    What is [employer]?  [employer] is a fitness and wellness program offered at no additional cost to seniors 65+ on eligible Medicare plans that helps you  get active, get fit, and connect with others.  The program is designed for all levels and abilities and provides access to online and in-person classes, over 15,000 fitness locations, and health & wellness discounts.  Before starting any exercise program, consult with your primary care provider.  For additional information and to check eligibility:  [email or web address][employer]

## 2025-05-22 NOTE — DISCHARGE SUMMARY
ED OBSERVATION PROGRESS/DISCHARGE SUMMARY    Date of Admission: 5/20/2025   LOS: 0 days   PCP: Epley, James, APRN      Subjective: Back pain    Hospital Outcome:   40-year-old male with a past medical history of cervical disc bulge status post discectomy with fusion at C5-6 in 2021 who was admitted to the observation unit for further evaluation of progressively worsening low back pain with right radiculopathy.  Patient did report he passed out secondary to the pain.  High-sensitivity troponins negative and a D-dimer normal.  An EKG showed sinus rhythm with no acute ischemia.     Neurosurgery saw and evaluated the patient in the emergency department.  Patient was started on multimodal pain control and IV steroids.  MRI of the lumbar spine without shows L5-S1 4 mm retrolisthesis of L5 with respect S1 with unroofed disc material as well as a left posterior lateral disc herniation narrowing the left S1 nerve root.      Patient went for LESI today.  Patient reports minimal improvement of pain today; discussed pain can take up to 48 hours to reach full effect and patient gave verbal understanding.  Offered referral for pain management in case he needs further injections and patient was agreeable for this plan.  Patient has been up ambulating to the restroom before and after injection and patient could not follow-up with outpatient PT.  All labs and imaging findings discussed with patient as well as specialist recommendations and patient is agreeable for discharge home at this time.    ROS:  General: no fevers, chills  Respiratory: no cough, dyspnea  Cardiovascular: no chest pain, palpitations  Abdomen: No abdominal pain, nausea, vomiting, or diarrhea  Neurologic: No focal weakness    Objective   Physical Exam:  I have reviewed the vital signs.  Temp:  [97.6 °F (36.4 °C)] 97.6 °F (36.4 °C)  Heart Rate:  [69-70] 70  Resp:  [18] 18  BP: (122-133)/(73-87) 133/87  General Appearance:    Alert, cooperative, no distress  Head:     Normocephalic, atraumatic, normal hearing  Eyes:    Sclerae anicteric, EOMI  Neck:   Supple, nontender  Lungs: Clear to auscultation bilaterally, respirations unlabored on room air  Heart: Regular rate and rhythm, S1 and S2 normal, no murmur  Abdomen:  Soft, nontender, bowel sounds active, nondistended  Extremities: No clubbing, cyanosis, or edema to lower extremities  Pulses:  2+ and symmetric in distal lower extremities  Skin: No rashes   Neurologic: Oriented x3, Normal strength to extremities    Results Review:    I have reviewed the labs, radiology results and diagnostic studies.    Results from last 7 days   Lab Units 05/21/25  0309   WBC 10*3/mm3 12.97*   HEMOGLOBIN g/dL 14.6   HEMATOCRIT % 45.0   PLATELETS 10*3/mm3 338     Results from last 7 days   Lab Units 05/21/25  0309 05/20/25  1006   SODIUM mmol/L 137 136   POTASSIUM mmol/L 4.3 4.2   CHLORIDE mmol/L 106 104   CO2 mmol/L 21.9* 25.1   BUN mg/dL 14 13   CREATININE mg/dL 0.79 0.95   CALCIUM mg/dL 9.0 9.3   BILIRUBIN mg/dL 0.5 0.4   ALK PHOS U/L 56 61   ALT (SGPT) U/L 34 39   AST (SGOT) U/L 21 28   GLUCOSE mg/dL 202* 122*     Imaging Results (Last 24 Hours)       Procedure Component Value Units Date/Time    MRI Lumbar Spine Without Contrast [[id number]] Collected: 05/20/25 1642     Updated: 05/21/25 0614    Narrative:      MRI OF THE LUMBAR SPINE WITHOUT CONTRAST ON 05/20/2025     CLINICAL HISTORY: This is a 40-year-old male patient who complains of  low back pain that radiates down left leg with numbness and tingling for  5 weeks.     TECHNIQUE: Sagittal T1, proton density and fast spin echo T2 and  fat-saturated fast spin-echo T2 weighted images were obtained of the  lumbar spine. In addition axial T2 weighted images were obtained from  T12-S2 and thin-cut axial T1 and T2 weighted images were obtained angled  through the interspaces from L3-S1.     COMPARISON: There are no prior lumbar spine imaging studies from [facility] for  comparison..     FINDINGS: The distal thoracic cord and the conus is normal in signal  intensity. The conus terminates at the superior body of the L1 lumbar  level which is normal.      At T12-L1, the disc space and facets are normal in appearance with no  canal or foraminal narrowing.      At L1-2, the disc space and facets are normal in appearance with no  canal or foraminal narrowing.     At L2-3, the disc space and facets are normal in appearance with no  canal or foraminal narrowing.     At L3-4, the disc space and facets are normal in appearance with no  canal or foraminal narrowing.     At L4-5 the disc space is normal in appearance. There is minimal facet  overgrowth. There is no canal or foraminal narrowing.      At L5-S1, there is minimal facet overgrowth. There is mild disc space  narrowing, diffuse disc desiccation. There is a 3 to 4 mm retrolisthesis  of L5 with respect to S1. There is unroofed disc material diffusely  bulging along the posterior superior endplate of S1. In addition there  is a focal left posterolateral disc subarticular disc herniation with a  extruded disc extending inferiorly along the left posterior superior  body and endplate of S1 with extruded disc material measuring 6 x 5 x 8  mm in mediolateral, anterior, posterior and craniocaudal dimension and  presses on the anteromedial aspect of the traversing left S1 nerve root  and deviates it posterolaterally and could account for an acute left S1  radiculopathy. There is no canal or right lateral recess or foraminal  narrowing.       Impression:      1. The disc spaces and facets are normal in appearance with no canal or  foraminal narrowing from T11-L5.     2. At L5-S1, there is mild disc space narrowing, disc desiccation, and  there is a 4 mm retrolisthesis of L5 with respect to S1 with unroofed  disc material diffusely bulging along the posterior superior endplate of  S1. In addition, there is a left posterolateral disc-subarticular  disc  herniation with extruded disc material extending inferiorly along the  left posterior superior body and endplate of S1 that maximally measures  6 x 5 x 8 mm in mediolateral, anterior posterior and craniocaudal  dimensions that narrows the left lateral recess and presses on the  anteromedial aspect of the traversing left S1 nerve root and deviates it  posterolaterally and could account for an acute left S1 radiculopathy  and correlation with clinical exam is recommended. The remainder of  lumbar spine MRI is unremarkable. The results were communicated to  [facility] observation unit by telephone on 05/20/2025 at 4 pm.  The results were also communicated to Barbara Hudson from neurosurgery by  telephone on 05/20/2025 at 4:05 pm.     This report was finalized on 5/21/2025 6:11 AM by Dr. Andre Richard M.D  on Workstation: [id number]               I have reviewed the medications.     Discharge Medications        ASK your doctor about these medications        Instructions Start Date   celecoxib 200 MG capsule  Commonly known as: CeleBREX   200 mg, Oral, 2 Times Daily, As needed for pain, lowest effective dose shortest time possible take with tall glass of water      cyclobenzaprine 5 MG tablet  Commonly known as: FLEXERIL   5-10 mg, Oral, Nightly PRN, Or back pain      gabapentin 300 MG capsule  Commonly known as: NEURONTIN   300 mg, Oral, 3 Times Daily, (Titrate slowly as needed caution falls sedation possible)             ---------------------------------------------------------------------------------------------  Assessment & Plan   Assessment/Problem List    Back pain      Plan:  Acute back pain:  - MRI of the lumbar spine shows L5-S1 4 mm retrolisthesis of L5 with respect S1 with unroofed disc material as well as a left posterior lateral disc herniation narrowing the left S1 nerve root.   - Neurosurgery recommended LESI injections; patient can follow-up as needed outpatient  - Patient has been  ambulating before and after injection and no PT needs for discharge appreciated  -LESI today  - Will discharge with Robaxin and Medrol Dosepak  -Will place a referral for pain management in case patient needs further injections     Syncope:  - Suspicious for vasovagal episode secondary to pain  - Troponins and EKG negative; D-dimer negative  - No events on telemetry throughout admission and no repeat episodes    Disposition: Discharge to home    Follow-up after Discharge: PCP in 1 to 2 weeks, neurosurgery as needed, referral for pain management placed    This note will serve as discharge summary      38 minutes have been spent by [facility] providers in the care of this patient while under observation status.    Appropriate PPE worn during patient encounter.  Hand hygeine performed before and after seeing the patient.      Annette Lehman PA-C 05/22/25 07:25 EDT

## 2025-05-23 ENCOUNTER — TRANSITIONAL CARE MANAGEMENT TELEPHONE ENCOUNTER (OUTPATIENT)
Dept: CALL CENTER | Facility: HOSPITAL | Age: 40
End: 2025-05-23
Payer: COMMERCIAL

## 2025-05-23 ENCOUNTER — OFFICE VISIT (OUTPATIENT)
Dept: FAMILY MEDICINE CLINIC | Facility: CLINIC | Age: 40
End: 2025-05-23
Payer: COMMERCIAL

## 2025-05-23 ENCOUNTER — TELEPHONE (OUTPATIENT)
Dept: NEUROSURGERY | Facility: CLINIC | Age: 40
End: 2025-05-23
Payer: COMMERCIAL

## 2025-05-23 VITALS
HEIGHT: 67 IN | WEIGHT: 174 LBS | OXYGEN SATURATION: 98 % | HEART RATE: 83 BPM | DIASTOLIC BLOOD PRESSURE: 92 MMHG | SYSTOLIC BLOOD PRESSURE: 139 MMHG | BODY MASS INDEX: 27.31 KG/M2

## 2025-05-23 DIAGNOSIS — M54.16 LUMBAR RADICULOPATHY, ACUTE: Primary | ICD-10-CM

## 2025-05-23 DIAGNOSIS — Z79.899 HIGH RISK MEDICATION USE: ICD-10-CM

## 2025-05-23 DIAGNOSIS — M51.26 LUMBAR DISC HERNIATION: ICD-10-CM

## 2025-05-23 RX ORDER — HYDROCODONE BITARTRATE AND ACETAMINOPHEN 7.5; 325 MG/1; MG/1
.5-1 TABLET ORAL EVERY 6 HOURS PRN
Qty: 15 TABLET | Refills: 0 | Status: SHIPPED | OUTPATIENT
Start: 2025-05-23 | End: 2025-06-04 | Stop reason: SDUPTHER

## 2025-05-23 RX ORDER — GABAPENTIN 300 MG/1
600 CAPSULE ORAL 3 TIMES DAILY
Qty: 90 CAPSULE | Refills: 2 | Status: SHIPPED | OUTPATIENT
Start: 2025-05-23 | End: 2025-06-03 | Stop reason: SDUPTHER

## 2025-05-23 NOTE — PROGRESS NOTES
"Chief Complaint  Transitional Care Management (Back pain/)    Subjective        Abdi Tyler presents to [facility]  History of Present Illness  Follow-up lumbar radiculopathy severe low back pain, excruciating pain recently vasovagal episode passing out due to pain was admitted in the hospital, L5-S1 has significant disc herniation, narrowing the left nerve root,, unroofed disc material with at least 4 mm retrolithiasis  Gabapentin, and helped, but the pain was so excruciating it was not helping since the discharge, last night had excruciating pain he took 3 gabapentin still have pain he was nearly crying from the pain, does not want to take narcotics, he has been trying to do exercises can do the Crawl, has not received a call for physical therapy.  He has no history of drug abuse no strong family history no alcohol abuse,    [place] clear  Follow-up from the hospital today, he has no bowel or bladder weakness no focal weakness no dragging his foot or plantarflexion weakness.  He was evaluated by neurosurgery, not a surgical candidate at this time,      Objective   Vital Signs:  /92   Pulse 83   Ht 170.2 cm (67.01\")   Wt 78.9 kg (174 lb)   SpO2 98%   BMI 27.25 kg/m²   Estimated body mass index is 27.25 kg/m² as calculated from the following:    Height as of this encounter: 170.2 cm (67.01\").    Weight as of this encounter: 78.9 kg (174 lb).            Physical Exam  Vitals reviewed.   Constitutional:       General: He is not in acute distress.     Appearance: Normal appearance. He is well-developed. He is not ill-appearing, toxic-appearing or diaphoretic.   HENT:      Head: Normocephalic.      Nose: Nose normal.   Eyes:      General: No scleral icterus.     Conjunctiva/sclera: Conjunctivae normal.      Pupils: Pupils are equal, round, and reactive to light.   Neck:      Thyroid: No thyromegaly.      Vascular: No JVD.   Cardiovascular:      Rate and Rhythm: Normal rate " and regular rhythm.      Heart sounds: Normal heart sounds. No murmur heard.     No friction rub. No gallop.   Pulmonary:      Effort: Pulmonary effort is normal. No respiratory distress.      Breath sounds: Normal breath sounds. No stridor. No wheezing or rales.   Abdominal:      General: Bowel sounds are normal. There is no distension.      Palpations: Abdomen is soft.      Tenderness: There is no abdominal tenderness.      Comments: No hepatosplenomegaly, no ascites,   Musculoskeletal:         General: No tenderness.      Cervical back: Neck supple.      Comments: Ear pain, deferred, straight leg raise, patient standing sitting at times squatting, hip pain,  No exaggeration appropriate no focal weakness   Lymphadenopathy:      Cervical: No cervical adenopathy.   Skin:     General: Skin is warm and dry.      Findings: No erythema or rash.   Neurological:      General: No focal deficit present.      Mental Status: He is alert and oriented to person, place, and time. Mental status is at baseline.      Deep Tendon Reflexes: Reflexes are normal and symmetric.   Psychiatric:         Behavior: Behavior normal.         Thought Content: Thought content normal.         Judgment: Judgment normal.        Result Review :                Assessment and Plan   Diagnoses and all orders for this visit:    1. Lumbar radiculopathy, acute (Primary)  -     Discontinue: gabapentin (NEURONTIN) 300 MG capsule; Take 2 capsules by mouth 3 (Three) Times a Day. For nerve pain, caution sedation falls  Dispense: 90 capsule; Refill: 2  -     Discontinue: HYDROcodone-acetaminophen (NORCO) 7.5-325 MG per tablet; Take 0.5-1 tablets by mouth Every 6 (Six) Hours As Needed for Moderate Pain. Caution, do not take with alcohol or other sedating medications may cause falls  Dispense: 15 tablet; Refill: 0    2. Lumbar disc herniation  -     Discontinue: gabapentin (NEURONTIN) 300 MG capsule; Take 2 capsules by mouth 3 (Three) Times a Day. For nerve pain,  caution sedation falls  Dispense: 90 capsule; Refill: 2  -     Discontinue: HYDROcodone-acetaminophen (NORCO) 7.5-325 MG per tablet; Take 0.5-1 tablets by mouth Every 6 (Six) Hours As Needed for Moderate Pain. Caution, do not take with alcohol or other sedating medications may cause falls  Dispense: 15 tablet; Refill: 0    3. High risk medication use             Follow Up   Return Work note, due to severe illness, patient is unable to return to work at this time., for Print discharge instructions/educational handouts.  Patient was given instructions and counseling regarding his condition or for health maintenance advice. Please see specific information pulled into the AVS if appropriate.   Risk-benefit opiates, risk of sedation, caution especially mixing any sedating medications avoid alcohol or other potentially high risk activities, not to drive with medication, only as directed short-term only, controlled subs agreement, reviewed history personal history family history he has no other risk factors,  Controlled substance agreement, in place, Joe hernadez, UDS,  Plan for discontinuing in place and discussed thoroughly answered all questions,  Patient Instructions   Discharge instruction    You are not able to work at this time,  Due to the diagnosis of disc herniation involvement of your nerve root, as well as the medication you are taking,  Flexing repetitive bending even without lifting is simply too much presently,  You will need to take short-term disability  Likely you will be off work 3 to 6 weeks,  But we will get you back to work as soon as possible,  Severe uncontrolled pain weakness bowel or bladder change incontinence, saddlebag paresthesias, fever chills back pain emergency room,    If taking hydrochlorothiazide take 1/2 tablet first and decrease gabapentin to 300 mg  Especially during the initial taking, to avoid, injury death falls hypoventilation breathing difficulties      That said you should be  able to manage this well just be conservative and what you are taking, simply get in a good position to avoid pain,  When able you can do some gentle exercises but really only a few and without any pain, something simple such as a  Left or right, stretch well sitting on the floor leg straight or out a bit excetra

## 2025-05-23 NOTE — PATIENT INSTRUCTIONS
Discharge instruction    You are not able to work at this time,  Due to the diagnosis of disc herniation involvement of your nerve root, as well as the medication you are taking,  Flexing repetitive bending even without lifting is simply too much presently,  You will need to take short-term disability  Likely you will be off work 3 to 6 weeks,  But we will get you back to work as soon as possible,  Severe uncontrolled pain weakness bowel or bladder change incontinence, saddlebag paresthesias, fever chills back pain emergency room,    If taking hydrochlorothiazide take 1/2 tablet first and decrease gabapentin to 300 mg  Especially during the initial taking, to avoid, injury death falls hypoventilation breathing difficulties      That said you should be able to manage this well just be conservative and what you are taking, simply get in a good position to avoid pain,  When able you can do some gentle exercises but really only a few and without any pain, something simple such as a  Left or right, stretch well sitting on the floor leg straight or out a bit excetra

## 2025-05-23 NOTE — TELEPHONE ENCOUNTER
Spoke with pt. Pt reports he was in the hospital on 05/20/2025 for back and leg pain. He had received an epidural injection. He says they told him it would kick in in 2 days but he is still having severe pain. Pain level is a 7 to an 8. Pain from mid back, radiating down the leg. He is taking Gabapentin and Robaxin for this, however pain is still present. He will follow up with his PCP today on 05/23/2025.

## 2025-05-23 NOTE — OUTREACH NOTE
Call Center TCM Note      Flowsheet Row Responses   [facility] patient discharged from? [facility]   Does the patient have one of the following disease processes/diagnoses(primary or secondary)? Other   TCM attempt successful? Yes   Discharge diagnosis Back pain   Does the patient have an appointment with their PCP within 7-14 days of discharge? Yes   TCM call completed? Yes   Wrap up additional comments Pt discharged from [facility] 05/22/2025, today completed FACE TO FACE TCM APPT with PCP James Epley MARY F - Medical Assistant    5/23/2025, 15:11 EDT

## 2025-05-27 NOTE — TELEPHONE ENCOUNTER
Tara Hudson APRN to Me         5/23/25  2:14 PM  Ask him if he has completed a Medrol Dosepak.  Ask him if he is on gabapentin.  We can call in a medication called Journavx.  It is a pain medication but not an opiate.  There are there are coupons in [facility] office if he wants to come get.  The directions that should be on the bottle but he would need to take 100 mg for the first dose and then to 50 mg twice daily.        Patient complaint was pain in lower back buttock with burning sensation when sitting/standing walking in left leg.     I spoke to patient today 5/27/25. He states he completed the MDP today. His PCP prescribed him Hydrocodone 7.5/325 and Gabapentin 300 mg BID Friday 5/23/25. He reports his pain went from a 10 top and 7 now.     He's had 1 GUSTAVO.  Referral in chart to pain management ready to schedule on pain management end. Patient ok with trying Journavx to see if that would help. His PCP gave him 15 Hydrocodone and he will be out in the next few days. [facility].     Will he need to follow up in our office?

## 2025-05-30 ENCOUNTER — TELEPHONE (OUTPATIENT)
Dept: FAMILY MEDICINE CLINIC | Facility: CLINIC | Age: 40
End: 2025-05-30
Payer: COMMERCIAL

## 2025-06-02 ENCOUNTER — TELEPHONE (OUTPATIENT)
Dept: FAMILY MEDICINE CLINIC | Facility: CLINIC | Age: 40
End: 2025-06-02

## 2025-06-02 NOTE — TELEPHONE ENCOUNTER
Called and notified PT FMLA paperwork is waiting to be signed off by provider and should be completed by the end of the day. Advised he will be contacted when completed. PT voiced understanding

## 2025-06-02 NOTE — TELEPHONE ENCOUNTER
Caller: Abdi Tyler    Relationship to patient: Self    Best call back number: [phone]     Patient is needing: WOULD LIKE A MESSAGE THROUGH [employer] TO LET HIM KNOW WHEN THE FMLA AND WORK FROM HOME PAPERWORK FOR HIS WIFE IS DONE

## 2025-06-03 DIAGNOSIS — M54.16 LUMBAR RADICULOPATHY, ACUTE: ICD-10-CM

## 2025-06-03 DIAGNOSIS — M51.26 LUMBAR DISC HERNIATION: ICD-10-CM

## 2025-06-03 RX ORDER — GABAPENTIN 300 MG/1
600 CAPSULE ORAL 3 TIMES DAILY
Qty: 90 CAPSULE | Refills: 2 | Status: SHIPPED | OUTPATIENT
Start: 2025-06-03

## 2025-06-03 NOTE — TELEPHONE ENCOUNTER
Rx Refill Note  Requested Prescriptions     Pending Prescriptions Disp Refills    gabapentin (NEURONTIN) 300 MG capsule 90 capsule 2     Sig: Take 2 capsules by mouth 3 (Three) Times a Day. For nerve pain, caution sedation falls      Last office visit with prescribing clinician: 5/23/2025   Last telemedicine visit with prescribing clinician: Visit date not found   Next office visit with prescribing clinician: 6/4/2025                         Would you like a call back once the refill request has been completed: [] Yes [] No    If the office needs to give you a call back, can they leave a voicemail: [] Yes [] No    Blanquita Maher MA  06/03/25, 15:59 EDT

## 2025-06-04 ENCOUNTER — OFFICE VISIT (OUTPATIENT)
Dept: FAMILY MEDICINE CLINIC | Facility: CLINIC | Age: 40
End: 2025-06-04
Payer: COMMERCIAL

## 2025-06-04 ENCOUNTER — TELEPHONE (OUTPATIENT)
Dept: FAMILY MEDICINE CLINIC | Facility: CLINIC | Age: 40
End: 2025-06-04

## 2025-06-04 VITALS
HEART RATE: 93 BPM | SYSTOLIC BLOOD PRESSURE: 137 MMHG | HEIGHT: 67 IN | WEIGHT: 174 LBS | BODY MASS INDEX: 27.31 KG/M2 | DIASTOLIC BLOOD PRESSURE: 100 MMHG | OXYGEN SATURATION: 97 %

## 2025-06-04 DIAGNOSIS — M50.222 HERNIATION OF INTERVERTEBRAL DISC AT C5-C6 LEVEL: Primary | ICD-10-CM

## 2025-06-04 DIAGNOSIS — M54.16 LUMBAR RADICULOPATHY, ACUTE: ICD-10-CM

## 2025-06-04 DIAGNOSIS — M51.26 LUMBAR DISC HERNIATION: ICD-10-CM

## 2025-06-04 DIAGNOSIS — Z79.899 HIGH RISK MEDICATION USE: ICD-10-CM

## 2025-06-04 PROCEDURE — 99213 OFFICE O/P EST LOW 20 MIN: CPT | Performed by: NURSE PRACTITIONER

## 2025-06-04 RX ORDER — HYDROCODONE BITARTRATE AND ACETAMINOPHEN 7.5; 325 MG/1; MG/1
.5-1 TABLET ORAL EVERY 6 HOURS PRN
Qty: 15 TABLET | Refills: 0 | Status: SHIPPED | OUTPATIENT
Start: 2025-06-04

## 2025-06-04 NOTE — PROGRESS NOTES
"Chief Complaint  Lumbar radiculopathy, acute (Back is improoving pt is no longer using cane while walking)    Subjective        Abdi Tyler presents to [facility]  History of Present Illness  Follow-up severe low back pain lumbar radiculopathy without focal weakness fever chills, patient not able able to do his job, presently he is out on disability leave per my direction,  For nerve pain severe nerve pain gabapentin now 600 mg 3 times a day this helps occasionally has had to take for breakthrough pain hydrocodone still has a couple tablets left  This was a essentially very short-term medication  He is taking this appropriately have no difficulties no weakness no side effects, he has been diligent not to cause increased pain he really has not any, lifting restriction or any repetitive bending squatting he cannot do, and he should avoid these activities no prolonged sitting no prolonged standing no repetitive reaching bending lifting, if any amount  As he has young children, and he is not able to complete meals and central caring around the house or ADLs, his wife is also taking FMLA to assist him and she needs a letter of accommodation to be able to work from home, which would be an excellent plan.          Objective   Vital Signs:  /100   Pulse 93   Ht 170.2 cm (67.01\")   Wt 78.9 kg (174 lb)   SpO2 97%   BMI 27.25 kg/m²   Estimated body mass index is 27.25 kg/m² as calculated from the following:    Height as of this encounter: 170.2 cm (67.01\").    Weight as of this encounter: 78.9 kg (174 lb).            Physical Exam  Constitutional:       General: He is not in acute distress.     Appearance: Normal appearance. He is not ill-appearing or diaphoretic.   Eyes:      Conjunctiva/sclera: Conjunctivae normal.      Pupils: Pupils are equal, round, and reactive to light.   Pulmonary:      Effort: Pulmonary effort is normal. No respiratory distress.   Musculoskeletal:      " Comments: Patient is actually sitting in the floor, with his knees flexed, on his knees, upright to avoid pain as standing prolonged or sitting in the chair actually hurting too much and he simply following instructions by avoiding activities, pain  He did walk in here without focal weakness but the walk was long and he did have increased pain with walking.  He has no focal weakness no fever Joe clear   Skin:     General: Skin is warm and dry.   Neurological:      General: No focal deficit present.      Mental Status: Mental status is at baseline.   Psychiatric:         Mood and Affect: Mood normal.         Behavior: Behavior normal.         Thought Content: Thought content normal.         Judgment: Judgment normal.      Result Review :                Assessment and Plan   Diagnoses and all orders for this visit:    1. Herniation of intervertebral disc at C5-C6 level (Primary)    2. Lumbar disc herniation    3. Lumbar radiculopathy, acute             Follow Up   No follow-ups on file.  Patient was given instructions and counseling regarding his condition or for health maintenance advice. Please see specific information pulled into the AVS if appropriate.     There are no Patient Instructions on file for this visit.

## 2025-06-04 NOTE — PATIENT INSTRUCTIONS
Continue off work, follow present restrictions    Severe uncontrolled pain weakness fever emergency room  Continue gabapentin 3 times a day same dose  For rare breakthrough pain okay to take hydrocodone lowest effective dose but this is likely the last prescription you will need  Recheck 2-week        To whom it may concern,        Lula Tyler, please allow patient to work from home for the next 6 weeks due to her 's severe medical condition, requiring close intensive treatment.          Sincerely,        James Epley, APRN, FNP  6/4/2025

## 2025-06-06 RX ORDER — CELECOXIB 200 MG/1
200 CAPSULE ORAL 2 TIMES DAILY
Qty: 60 CAPSULE | Refills: 5 | OUTPATIENT
Start: 2025-06-06

## 2025-06-06 NOTE — TELEPHONE ENCOUNTER
HUB RELAY:    Left  w/ pt about refills for gabapentin - he will be able to  tomorrow per pharmacy - if he has any questions okay to transfer to me

## 2025-06-06 NOTE — TELEPHONE ENCOUNTER
Declined as pt has refill - spw/ pt he stated pharmacy isnt filling gabapentin or celebrex - spw/ pharmacy

## 2025-06-13 ENCOUNTER — TELEPHONE (OUTPATIENT)
Dept: PAIN MEDICINE | Facility: CLINIC | Age: 40
End: 2025-06-13
Payer: COMMERCIAL

## 2025-06-13 NOTE — TELEPHONE ENCOUNTER
Called pt. Spoke to pt. Reminded him of his new pt appt Monday at 2:20 with Lexx CASTILLO. Asked pt to arrive 15 min prior for paperwork

## 2025-06-16 ENCOUNTER — PREP FOR SURGERY (OUTPATIENT)
Dept: SURGERY | Facility: SURGERY CENTER | Age: 40
End: 2025-06-16
Payer: COMMERCIAL

## 2025-06-16 ENCOUNTER — OFFICE VISIT (OUTPATIENT)
Dept: PAIN MEDICINE | Facility: CLINIC | Age: 40
End: 2025-06-16
Payer: COMMERCIAL

## 2025-06-16 VITALS
SYSTOLIC BLOOD PRESSURE: 148 MMHG | DIASTOLIC BLOOD PRESSURE: 100 MMHG | WEIGHT: 170.4 LBS | TEMPERATURE: 98 F | HEIGHT: 67 IN | BODY MASS INDEX: 26.74 KG/M2 | HEART RATE: 88 BPM | OXYGEN SATURATION: 98 %

## 2025-06-16 DIAGNOSIS — M54.16 LUMBAR RADICULOPATHY, ACUTE: ICD-10-CM

## 2025-06-16 DIAGNOSIS — M54.16 LUMBAR RADICULOPATHY: Primary | ICD-10-CM

## 2025-06-16 DIAGNOSIS — M51.26 LUMBAR DISC HERNIATION: Primary | ICD-10-CM

## 2025-06-16 DIAGNOSIS — M51.26 DISPLACEMENT OF LUMBAR INTERVERTEBRAL DISC WITHOUT MYELOPATHY: ICD-10-CM

## 2025-06-16 DIAGNOSIS — M62.838 MUSCLE SPASM: ICD-10-CM

## 2025-06-16 DIAGNOSIS — M51.26 LUMBAR DISC HERNIATION: ICD-10-CM

## 2025-06-16 DIAGNOSIS — M54.16 LUMBAR RADICULOPATHY: ICD-10-CM

## 2025-06-16 LAB
6MAM SERPLBLD-MCNC: NEGATIVE NG/ML
AMPHETAMINES SERPL QL SCN: NEGATIVE NG/ML
BARBITURATES SERPL QL SCN: NEGATIVE UG/ML
BENZODIAZ SERPL QL SCN: NEGATIVE NG/ML
CANNABINOIDS SERPL QL SCN: NEGATIVE NG/ML
COCAINE+BZE SERPL QL SCN: NEGATIVE NG/ML
CODEINE SERPLBLD CFM-MCNC: NEGATIVE NG/ML
DHC SERPLBLD CFM-MCNC: NEGATIVE NG/ML
HYDROCODONE SERPLBLD CFM-MCNC: 8.4 NG/ML
HYDROMORPHONE SERPLBLD CFM-MCNC: NEGATIVE NG/ML
METHADONE SERPL QL SCN: NEGATIVE NG/ML
MORPHINE SERPLBLD-MCNC: NEGATIVE NG/ML
OPIATES SERPL QL SCN: ABNORMAL NG/ML
OPIATES SPEC QL: POSITIVE
OXYCODONE SERPLBLD CFM-MCNC: NEGATIVE NG/ML
OXYCODONE+OXYMORPHONE SERPLBLD CFM-IMP: NEGATIVE
OXYCODONE+OXYMORPHONE SERPLBLD QL SCN: NEGATIVE NG/ML
OXYMORPHONE SERPLBLD CFM-MCNC: NEGATIVE NG/ML
PCP SERPL QL SCN: NEGATIVE NG/ML
PROPOXYPH SERPL QL SCN: NEGATIVE NG/ML

## 2025-06-16 PROCEDURE — 99215 OFFICE O/P EST HI 40 MIN: CPT | Performed by: PHYSICIAN ASSISTANT

## 2025-06-16 RX ORDER — HYDROCODONE BITARTRATE AND ACETAMINOPHEN 7.5; 325 MG/1; MG/1
.5-1 TABLET ORAL 2 TIMES DAILY PRN
Qty: 20 TABLET | Refills: 0 | Status: SHIPPED | OUTPATIENT
Start: 2025-06-16

## 2025-06-16 RX ORDER — SODIUM CHLORIDE 0.9 % (FLUSH) 0.9 %
10 SYRINGE (ML) INJECTION EVERY 12 HOURS SCHEDULED
OUTPATIENT
Start: 2025-06-16

## 2025-06-16 RX ORDER — METHOCARBAMOL 750 MG/1
750 TABLET, FILM COATED ORAL 4 TIMES DAILY
Qty: 120 TABLET | Refills: 0 | Status: SHIPPED | OUTPATIENT
Start: 2025-06-16

## 2025-06-16 RX ORDER — SODIUM CHLORIDE 0.9 % (FLUSH) 0.9 %
10 SYRINGE (ML) INJECTION AS NEEDED
OUTPATIENT
Start: 2025-06-16

## 2025-06-16 NOTE — H&P (VIEW-ONLY)
CHIEF COMPLAINT  Back and leg pain    Subjective   Abdi Tyler is a 40 y.o. male.   He presents to the office for initial evaluation of back pain. He was referred here by Annette Lehman PA-C.  This patient states that in the month of March he had sudden acute onset of low back pain without precipitating trauma or inciting event.  He feels that the pain has improved slightly since he has been utilizing an inversion table at home 3 times a week.  Patient reports primarily left-sided lumbar spine pain which radiates into the groin as well as into the posterior aspect of the buttock thigh terminating at the calf.  Significantly increased with prolonged sitting greater than 20 minutes as well as increased with lying down.  He denies lower extremity numbness, tingling or weakness.  Denies bladder or bowel dysfunction and also denies any signs of saddle anesthesia.    Patient reports previous history of ACDF at C5-6 with Dr Robert Bynum in 08/2021.  He denies any history of lumbar spine surgery.    Denies any interventional pain management but did undergo L5-S1 intralaminar LESI with left paramedian approach on 5/22/2025 with Dr. Chow at [facility].  He denies any improvement of pain with that injection.    The patient states that he was referred to physical therapy however that he has not been contacted to initiate any treatment.  He has been physician guided HEP at home which she does feel is helpful as he is no longer having to walk with a cane.  As stated above he is also utilizing inversion table 3 times a week.    Pain today 4/10 VAS in severity.          Back Pain  Chronicity:  New  Onset:  More than 1 month ago  Frequency:  Constantly  Progression since onset:  Improving  Pain location:  Lumbar spine  Pain quality:  Burning and aching  Radiates to:  Left buttock, left thigh and left anterolateral lower leg  Pain-numeric:  4/10  Pain severity:  Moderate  Pain is:  Worse during the night  Aggravated by:   Sitting and certain positions  Associated symptoms: leg pain and numbness (burning pain left leg)    Associated symptoms: no fever and no weakness    Treatments tried:  Home exercises and muscle relaxant (inversion table at home)       PEG Assessment   What number best describes your pain on average in the past week?5  What number best describes how, during the past week, pain has interfered with your enjoyment of life?6  What number best describes how, during the past week, pain has interfered with your general activity?  8        Current Outpatient Medications:     celecoxib (CeleBREX) 200 MG capsule, Take 1 capsule by mouth 2 (Two) Times a Day. As needed for pain, lowest effective dose shortest time possible take with tall glass of water, Disp: 60 capsule, Rfl: 5    gabapentin (NEURONTIN) 300 MG capsule, Take 2 capsules by mouth 3 (Three) Times a Day. For nerve pain, caution sedation falls, Disp: 90 capsule, Rfl: 2    HYDROcodone-acetaminophen (NORCO) 7.5-325 MG per tablet, Take 0.5-1 tablets by mouth Every 6 (Six) Hours As Needed for Moderate Pain. Caution, do not take with alcohol or other sedating medications may cause falls, Disp: 15 tablet, Rfl: 0    methocarbamol (ROBAXIN) 750 MG tablet, Take 1 tablet by mouth 4 (Four) Times a Day., Disp: 120 tablet, Rfl: 0    Suzetrigine (JOURNAVX) 50 MG tablet, Take 1 tablet by mouth Daily. (Patient not taking: Reported on 6/16/2025), Disp: 14 tablet, Rfl: 1    The following portions of the patient's history were reviewed and updated as appropriate: allergies, current medications, past family history, past medical history, past social history, past surgical history, and problem list.      REVIEW OF PERTINENT MEDICAL DATA  Review of discharge summary dated 5/20/2025 at [facility] ER: 40-year-old male with previous history C5-6 ACDF in 2021 was admitted to observation unit for further evaluation of progressively worsening low back pain with radiculopathy.  The patient reported  passing out secondary to the pain.  Activity troponins were negative and a D-dimer was normal.  An EKG showed sinus rhythm with no acute ischemia.  Neurosurgery was consulted in the ED and patient was started on multimodal pain control and IV steroids.  Patient went for an LESI with minimal improvement of pain.        MRI OF THE LUMBAR SPINE WITHOUT CONTRAST ON 05/20/2025     CLINICAL HISTORY: This is a 40-year-old male patient who complains of  low back pain that radiates down left leg with numbness and tingling for  5 weeks.     TECHNIQUE: Sagittal T1, proton density and fast spin echo T2 and  fat-saturated fast spin-echo T2 weighted images were obtained of the  lumbar spine. In addition axial T2 weighted images were obtained from  T12-S2 and thin-cut axial T1 and T2 weighted images were obtained angled  through the interspaces from L3-S1.     COMPARISON: There are no prior lumbar spine imaging studies from [facility] for comparison..     FINDINGS: The distal thoracic cord and the conus is normal in signal  intensity. The conus terminates at the superior body of the L1 lumbar  level which is normal.      At T12-L1, the disc space and facets are normal in appearance with no  canal or foraminal narrowing.      At L1-2, the disc space and facets are normal in appearance with no  canal or foraminal narrowing.     At L2-3, the disc space and facets are normal in appearance with no  canal or foraminal narrowing.     At L3-4, the disc space and facets are normal in appearance with no  canal or foraminal narrowing.     At L4-5 the disc space is normal in appearance. There is minimal facet  overgrowth. There is no canal or foraminal narrowing.      At L5-S1, there is minimal facet overgrowth. There is mild disc space  narrowing, diffuse disc desiccation. There is a 3 to 4 mm retrolisthesis  of L5 with respect to S1. There is unroofed disc material diffusely  bulging along the posterior superior  endplate of S1. In addition there  is a focal left posterolateral disc subarticular disc herniation with a  extruded disc extending inferiorly along the left posterior superior  body and endplate of S1 with extruded disc material measuring 6 x 5 x 8  mm in mediolateral, anterior, posterior and craniocaudal dimension and  presses on the anteromedial aspect of the traversing left S1 nerve root  and deviates it posterolaterally and could account for an acute left S1  radiculopathy. There is no canal or right lateral recess or foraminal  narrowing.     IMPRESSION:  1. The disc spaces and facets are normal in appearance with no canal or  foraminal narrowing from T11-L5.     2. At L5-S1, there is mild disc space narrowing, disc desiccation, and  there is a 4 mm retrolisthesis of L5 with respect to S1 with unroofed  disc material diffusely bulging along the posterior superior endplate of  S1. In addition, there is a left posterolateral disc-subarticular disc  herniation with extruded disc material extending inferiorly along the  left posterior superior body and endplate of S1 that maximally measures  6 x 5 x 8 mm in mediolateral, anterior posterior and craniocaudal  dimensions that narrows the left lateral recess and presses on the  anteromedial aspect of the traversing left S1 nerve root and deviates it  posterolaterally and could account for an acute left S1 radiculopathy  and correlation with clinical exam is recommended. The remainder of  lumbar spine MRI is unremarkable. The results were communicated to  [facility] observation unit by telephone on 05/20/2025 at 4 pm.  The results were also communicated to Barbara Hudson from neurosurgery by  telephone on 05/20/2025 at 4:05 pm.     This report was finalized on 5/21/2025 6:11 AM by Dr. Andre Richard M.D    Review of Systems   Constitutional:  Negative for chills and fever.   Respiratory:  Negative for cough and shortness of breath.    Gastrointestinal:  Negative  "for constipation and diarrhea.   Genitourinary:  Positive for difficulty urinating (pain when straining).   Musculoskeletal:  Positive for back pain.   Neurological:  Positive for numbness (burning pain left leg). Negative for weakness and light-headedness.       I have reviewed and confirmed the accuracy of the ROS as documented by the MA/LPN/RN JONATHAN Benoit    Vitals:    06/16/25 1452   BP: 148/100   BP Location: Left arm   Patient Position: Sitting   Pulse: 88   Temp: 98 °F (36.7 °C)   TempSrc: Temporal   SpO2: 98%   Weight: 77.3 kg (170 lb 6.4 oz)   Height: 170.2 cm (67.01\")   PainSc: 4    PainLoc: Back         Objective       Physical Exam  Vitals and nursing note reviewed.   Constitutional:       Appearance: Normal appearance. He is normal weight.      Comments: Exam room the patient is kneeling on his knees on the floor to avoid having to sit     HENT:      Head: Normocephalic.   Cardiovascular:      Pulses:           Posterior tibial pulses are 2+ on the right side and 2+ on the left side.   Pulmonary:      Effort: Pulmonary effort is normal.   Musculoskeletal:      Lumbar back: Spasms and tenderness present. Decreased range of motion (exquisite pain with flexion). Positive left straight leg raise test.        Back:         Legs:    Skin:     General: Skin is warm and dry.   Neurological:      General: No focal deficit present.      Mental Status: He is alert and oriented to person, place, and time.      Cranial Nerves: Cranial nerves 2-12 are intact.      Sensory: Sensation is intact.      Motor: Motor function is intact.      Gait: Gait is intact.      Deep Tendon Reflexes:      Reflex Scores:       Patellar reflexes are 1+ on the right side and 1+ on the left side.       Achilles reflexes are 1+ on the right side and 1+ on the left side.  Psychiatric:         Mood and Affect: Mood normal.         Behavior: Behavior normal.         Thought Content: Thought content normal.         Judgment: Judgment " normal.         Assessment & Plan   Diagnoses and all orders for this visit:    1. Lumbar disc herniation (Primary)  -     Ambulatory Referral to Physical Therapy for Evaluation & Treatment    2. Lumbar radiculopathy  -     Ambulatory Referral to Physical Therapy for Evaluation & Treatment    3. Displacement of lumbar intervertebral disc without myelopathy    4. Muscle spasm  -     methocarbamol (ROBAXIN) 750 MG tablet; Take 1 tablet by mouth 4 (Four) Times a Day.  Dispense: 120 tablet; Refill: 0        --- Abdi Tyler reports a pain score of 4.  Given his pain assessment as noted, treatment options were discussed and the following options were decided upon as a follow-up plan to address the patient's pain: continuation of current treatment plan for pain, prescription for non-opiod analgesics, prescription for opiod analgesics, steroid injections, and use of non-medical modalities (ice, heat, stretching and/or behavior modifications).    PHQ-2 Depression Screening  Little interest or pleasure in doing things? Not at all   Feeling down, depressed, or hopeless? Not at all   PHQ-2 Total Score 0     Tobacco Use: Medium Risk (6/16/2025)    Patient History     Smoking Tobacco Use: Former     Smokeless Tobacco Use: Never     Passive Exposure: Not on file         --- Based on the patient's physical exam and MRI findings I feel that he would best benefit from left L5-S1, S1 lumbar TFESI under fluoroscopy guidance.  The risk and benefits of the procedure been discussed with the patient and all questions have been addressed.  --- Plan for follow-up 1 month after undergoing injective therapy  --- Referral has been sent to [facility] for further evaluation and treat recommendations to be made including traction therapy  --- Patient will continue with gabapentin 600 mg p.o. 3 times daily.  --- He is requesting a refill of the hydrocodone 5 mg which he takes half a tablet p.o. twice daily.  I have discussed with the  patient that we would wish for this to be a very short-term prescription and will send in acute supply of #20 pills that he will need to use sparingly.     Pain / Disability Scale    The scale used for measurement of pain and/or disability for this patient was the [place] back pain disability scale.  The score was 48 on 06/16/2025.      I spent 55 minutes caring for Abdi on this date of service. This time includes time spent by me in the following activities: preparing for the visit, reviewing tests, performing a medically appropriate examination and/or evaluation, counseling and educating the patient/family/caregiver, documenting information in the medical record, independently interpreting results and communicating that information with the patient/family/caregiver, care coordination, ordering medications, ordering procedure(s), and reviewing a separately obtained history         ---  Indications for epidural injection:  Plan is to proceed with epidural at the appropriate level.  If the patient receives significant pain reduction and improvement in function and the plan will be to repeat the epidural when the pain worsens.  If a second epidural provides at least 6 weeks of sustained improvement that includes both pain reduction and improvement in function then an epidural injection could be repeated once again at the same level.  This is a mutual decision between the clinician and the patient that includes discussions including risks and benefits in detail as well as alternative therapies.  Patient's questions were answered to their satisfaction and to their understanding.  ---  Discussed with the patient that sedation is optional for this procedure.  The sedation offered is called conscious sedation which is different from general anesthesia that is utilized in surgical procedures. The dosing of the sedation is determined by the physician and they will be monitored throughout the procedure. With conscious  sedation it is possible to remember parts or all of the procedure, this is normal. They will need to have a  with them as driving is prohibited following conscious sedation.     NPO instructions for conscious sedation:  --- Do not eat 8 hours prior to the procedure.   --- Do not drink any dairy or citrus 4 hours prior to the procedure.   --- Do not drink anything, including clear liquids, 2 hours prior to procedure.     If the NPO instructions are not followed then the procedure may be performed without sedation or the procedure will need to be rescheduled.           BERKLEY REPORT    As part of the patient's treatment plan, I am prescribing controlled substances. The patient has been made aware of appropriate use of such medications, including potential risk of somnolence, limited ability to drive and/or work safely, and the potential for dependence or overdose. It has also bee made clear that these medications are for use by this patient only, without concomitant use of alcohol or other substances unless prescribed.     Patient has completed prescribing agreement detailing terms of continued prescribing of controlled substances, including monitoring BERKLEY reports, urine drug screening, and pill counts if necessary. The patient is aware that inappropriate use will results in cessation of prescribing such medications.    BERKLEY report has been reviewed and scanned into the patient's chart.    As the clinician, I personally reviewed the BERKLEY from 6/16/25 while the patient was in the office today.    History and physical exam exhibit continued safe and appropriate use of controlled substances.       Dictated utilizing Dragon dictation.

## 2025-06-16 NOTE — PROGRESS NOTES
CHIEF COMPLAINT  Back and leg pain    Subjective   Abdi Tyler is a 40 y.o. male.   He presents to the office for initial evaluation of back pain. He was referred here by Annette Lehman PA-C.  This patient states that in the month of March he had sudden acute onset of low back pain without precipitating trauma or inciting event.  He feels that the pain has improved slightly since he has been utilizing an inversion table at home 3 times a week.  Patient reports primarily left-sided lumbar spine pain which radiates into the groin as well as into the posterior aspect of the buttock thigh terminating at the calf.  Significantly increased with prolonged sitting greater than 20 minutes as well as increased with lying down.  He denies lower extremity numbness, tingling or weakness.  Denies bladder or bowel dysfunction and also denies any signs of saddle anesthesia.    Patient reports previous history of ACDF at C5-6 with Dr Robert Bynum in 08/2021.  He denies any history of lumbar spine surgery.    Denies any interventional pain management but did undergo L5-S1 intralaminar LESI with left paramedian approach on 5/22/2025 with Dr. Chow at [facility].  He denies any improvement of pain with that injection.    The patient states that he was referred to physical therapy however that he has not been contacted to initiate any treatment.  He has been physician guided HEP at home which she does feel is helpful as he is no longer having to walk with a cane.  As stated above he is also utilizing inversion table 3 times a week.    Pain today 4/10 VAS in severity.          Back Pain  Chronicity:  New  Onset:  More than 1 month ago  Frequency:  Constantly  Progression since onset:  Improving  Pain location:  Lumbar spine  Pain quality:  Burning and aching  Radiates to:  Left buttock, left thigh and left anterolateral lower leg  Pain-numeric:  4/10  Pain severity:  Moderate  Pain is:  Worse during the night  Aggravated by:   Sitting and certain positions  Associated symptoms: leg pain and numbness (burning pain left leg)    Associated symptoms: no fever and no weakness    Treatments tried:  Home exercises and muscle relaxant (inversion table at home)       PEG Assessment   What number best describes your pain on average in the past week?5  What number best describes how, during the past week, pain has interfered with your enjoyment of life?6  What number best describes how, during the past week, pain has interfered with your general activity?  8        Current Outpatient Medications:     celecoxib (CeleBREX) 200 MG capsule, Take 1 capsule by mouth 2 (Two) Times a Day. As needed for pain, lowest effective dose shortest time possible take with tall glass of water, Disp: 60 capsule, Rfl: 5    gabapentin (NEURONTIN) 300 MG capsule, Take 2 capsules by mouth 3 (Three) Times a Day. For nerve pain, caution sedation falls, Disp: 90 capsule, Rfl: 2    HYDROcodone-acetaminophen (NORCO) 7.5-325 MG per tablet, Take 0.5-1 tablets by mouth Every 6 (Six) Hours As Needed for Moderate Pain. Caution, do not take with alcohol or other sedating medications may cause falls, Disp: 15 tablet, Rfl: 0    methocarbamol (ROBAXIN) 750 MG tablet, Take 1 tablet by mouth 4 (Four) Times a Day., Disp: 120 tablet, Rfl: 0    Suzetrigine (JOURNAVX) 50 MG tablet, Take 1 tablet by mouth Daily. (Patient not taking: Reported on 6/16/2025), Disp: 14 tablet, Rfl: 1    The following portions of the patient's history were reviewed and updated as appropriate: allergies, current medications, past family history, past medical history, past social history, past surgical history, and problem list.      REVIEW OF PERTINENT MEDICAL DATA  Review of discharge summary dated 5/20/2025 at [facility] ER: 40-year-old male with previous history C5-6 ACDF in 2021 was admitted to observation unit for further evaluation of progressively worsening low back pain with radiculopathy.  The patient reported  passing out secondary to the pain.  Activity troponins were negative and a D-dimer was normal.  An EKG showed sinus rhythm with no acute ischemia.  Neurosurgery was consulted in the ED and patient was started on multimodal pain control and IV steroids.  Patient went for an LESI with minimal improvement of pain.        MRI OF THE LUMBAR SPINE WITHOUT CONTRAST ON 05/20/2025     CLINICAL HISTORY: This is a 40-year-old male patient who complains of  low back pain that radiates down left leg with numbness and tingling for  5 weeks.     TECHNIQUE: Sagittal T1, proton density and fast spin echo T2 and  fat-saturated fast spin-echo T2 weighted images were obtained of the  lumbar spine. In addition axial T2 weighted images were obtained from  T12-S2 and thin-cut axial T1 and T2 weighted images were obtained angled  through the interspaces from L3-S1.     COMPARISON: There are no prior lumbar spine imaging studies from [facility] for comparison..     FINDINGS: The distal thoracic cord and the conus is normal in signal  intensity. The conus terminates at the superior body of the L1 lumbar  level which is normal.      At T12-L1, the disc space and facets are normal in appearance with no  canal or foraminal narrowing.      At L1-2, the disc space and facets are normal in appearance with no  canal or foraminal narrowing.     At L2-3, the disc space and facets are normal in appearance with no  canal or foraminal narrowing.     At L3-4, the disc space and facets are normal in appearance with no  canal or foraminal narrowing.     At L4-5 the disc space is normal in appearance. There is minimal facet  overgrowth. There is no canal or foraminal narrowing.      At L5-S1, there is minimal facet overgrowth. There is mild disc space  narrowing, diffuse disc desiccation. There is a 3 to 4 mm retrolisthesis  of L5 with respect to S1. There is unroofed disc material diffusely  bulging along the posterior superior  endplate of S1. In addition there  is a focal left posterolateral disc subarticular disc herniation with a  extruded disc extending inferiorly along the left posterior superior  body and endplate of S1 with extruded disc material measuring 6 x 5 x 8  mm in mediolateral, anterior, posterior and craniocaudal dimension and  presses on the anteromedial aspect of the traversing left S1 nerve root  and deviates it posterolaterally and could account for an acute left S1  radiculopathy. There is no canal or right lateral recess or foraminal  narrowing.     IMPRESSION:  1. The disc spaces and facets are normal in appearance with no canal or  foraminal narrowing from T11-L5.     2. At L5-S1, there is mild disc space narrowing, disc desiccation, and  there is a 4 mm retrolisthesis of L5 with respect to S1 with unroofed  disc material diffusely bulging along the posterior superior endplate of  S1. In addition, there is a left posterolateral disc-subarticular disc  herniation with extruded disc material extending inferiorly along the  left posterior superior body and endplate of S1 that maximally measures  6 x 5 x 8 mm in mediolateral, anterior posterior and craniocaudal  dimensions that narrows the left lateral recess and presses on the  anteromedial aspect of the traversing left S1 nerve root and deviates it  posterolaterally and could account for an acute left S1 radiculopathy  and correlation with clinical exam is recommended. The remainder of  lumbar spine MRI is unremarkable. The results were communicated to  [facility] observation unit by telephone on 05/20/2025 at 4 pm.  The results were also communicated to Barbara Hudson from neurosurgery by  telephone on 05/20/2025 at 4:05 pm.     This report was finalized on 5/21/2025 6:11 AM by Dr. Andre Richard M.D    Review of Systems   Constitutional:  Negative for chills and fever.   Respiratory:  Negative for cough and shortness of breath.    Gastrointestinal:  Negative  "for constipation and diarrhea.   Genitourinary:  Positive for difficulty urinating (pain when straining).   Musculoskeletal:  Positive for back pain.   Neurological:  Positive for numbness (burning pain left leg). Negative for weakness and light-headedness.       I have reviewed and confirmed the accuracy of the ROS as documented by the MA/LPN/RN JONATHAN Benoit    Vitals:    06/16/25 1452   BP: 148/100   BP Location: Left arm   Patient Position: Sitting   Pulse: 88   Temp: 98 °F (36.7 °C)   TempSrc: Temporal   SpO2: 98%   Weight: 77.3 kg (170 lb 6.4 oz)   Height: 170.2 cm (67.01\")   PainSc: 4    PainLoc: Back         Objective       Physical Exam  Vitals and nursing note reviewed.   Constitutional:       Appearance: Normal appearance. He is normal weight.      Comments: Exam room the patient is kneeling on his knees on the floor to avoid having to sit     HENT:      Head: Normocephalic.   Cardiovascular:      Pulses:           Posterior tibial pulses are 2+ on the right side and 2+ on the left side.   Pulmonary:      Effort: Pulmonary effort is normal.   Musculoskeletal:      Lumbar back: Spasms and tenderness present. Decreased range of motion (exquisite pain with flexion). Positive left straight leg raise test.        Back:         Legs:    Skin:     General: Skin is warm and dry.   Neurological:      General: No focal deficit present.      Mental Status: He is alert and oriented to person, place, and time.      Cranial Nerves: Cranial nerves 2-12 are intact.      Sensory: Sensation is intact.      Motor: Motor function is intact.      Gait: Gait is intact.      Deep Tendon Reflexes:      Reflex Scores:       Patellar reflexes are 1+ on the right side and 1+ on the left side.       Achilles reflexes are 1+ on the right side and 1+ on the left side.  Psychiatric:         Mood and Affect: Mood normal.         Behavior: Behavior normal.         Thought Content: Thought content normal.         Judgment: Judgment " normal.         Assessment & Plan   Diagnoses and all orders for this visit:    1. Lumbar disc herniation (Primary)  -     Ambulatory Referral to Physical Therapy for Evaluation & Treatment    2. Lumbar radiculopathy  -     Ambulatory Referral to Physical Therapy for Evaluation & Treatment    3. Displacement of lumbar intervertebral disc without myelopathy    4. Muscle spasm  -     methocarbamol (ROBAXIN) 750 MG tablet; Take 1 tablet by mouth 4 (Four) Times a Day.  Dispense: 120 tablet; Refill: 0        --- Abdi Tyler reports a pain score of 4.  Given his pain assessment as noted, treatment options were discussed and the following options were decided upon as a follow-up plan to address the patient's pain: continuation of current treatment plan for pain, prescription for non-opiod analgesics, prescription for opiod analgesics, steroid injections, and use of non-medical modalities (ice, heat, stretching and/or behavior modifications).    PHQ-2 Depression Screening  Little interest or pleasure in doing things? Not at all   Feeling down, depressed, or hopeless? Not at all   PHQ-2 Total Score 0     Tobacco Use: Medium Risk (6/16/2025)    Patient History     Smoking Tobacco Use: Former     Smokeless Tobacco Use: Never     Passive Exposure: Not on file         --- Based on the patient's physical exam and MRI findings I feel that he would best benefit from left L5-S1, S1 lumbar TFESI under fluoroscopy guidance.  The risk and benefits of the procedure been discussed with the patient and all questions have been addressed.  --- Plan for follow-up 1 month after undergoing injective therapy  --- Referral has been sent to [facility] for further evaluation and treat recommendations to be made including traction therapy  --- Patient will continue with gabapentin 600 mg p.o. 3 times daily.  --- He is requesting a refill of the hydrocodone 5 mg which he takes half a tablet p.o. twice daily.  I have discussed with the  patient that we would wish for this to be a very short-term prescription and will send in acute supply of #20 pills that he will need to use sparingly.     Pain / Disability Scale    The scale used for measurement of pain and/or disability for this patient was the [place] back pain disability scale.  The score was 48 on 06/16/2025.      I spent 55 minutes caring for Abdi on this date of service. This time includes time spent by me in the following activities: preparing for the visit, reviewing tests, performing a medically appropriate examination and/or evaluation, counseling and educating the patient/family/caregiver, documenting information in the medical record, independently interpreting results and communicating that information with the patient/family/caregiver, care coordination, ordering medications, ordering procedure(s), and reviewing a separately obtained history         ---  Indications for epidural injection:  Plan is to proceed with epidural at the appropriate level.  If the patient receives significant pain reduction and improvement in function and the plan will be to repeat the epidural when the pain worsens.  If a second epidural provides at least 6 weeks of sustained improvement that includes both pain reduction and improvement in function then an epidural injection could be repeated once again at the same level.  This is a mutual decision between the clinician and the patient that includes discussions including risks and benefits in detail as well as alternative therapies.  Patient's questions were answered to their satisfaction and to their understanding.  ---  Discussed with the patient that sedation is optional for this procedure.  The sedation offered is called conscious sedation which is different from general anesthesia that is utilized in surgical procedures. The dosing of the sedation is determined by the physician and they will be monitored throughout the procedure. With conscious  sedation it is possible to remember parts or all of the procedure, this is normal. They will need to have a  with them as driving is prohibited following conscious sedation.     NPO instructions for conscious sedation:  --- Do not eat 8 hours prior to the procedure.   --- Do not drink any dairy or citrus 4 hours prior to the procedure.   --- Do not drink anything, including clear liquids, 2 hours prior to procedure.     If the NPO instructions are not followed then the procedure may be performed without sedation or the procedure will need to be rescheduled.           BERKLEY REPORT    As part of the patient's treatment plan, I am prescribing controlled substances. The patient has been made aware of appropriate use of such medications, including potential risk of somnolence, limited ability to drive and/or work safely, and the potential for dependence or overdose. It has also bee made clear that these medications are for use by this patient only, without concomitant use of alcohol or other substances unless prescribed.     Patient has completed prescribing agreement detailing terms of continued prescribing of controlled substances, including monitoring BERKLEY reports, urine drug screening, and pill counts if necessary. The patient is aware that inappropriate use will results in cessation of prescribing such medications.    BERKLEY report has been reviewed and scanned into the patient's chart.    As the clinician, I personally reviewed the BERKLEY from 6/16/25 while the patient was in the office today.    History and physical exam exhibit continued safe and appropriate use of controlled substances.       Dictated utilizing Dragon dictation.

## 2025-06-18 ENCOUNTER — TRANSCRIBE ORDERS (OUTPATIENT)
Dept: SURGERY | Facility: SURGERY CENTER | Age: 40
End: 2025-06-18
Payer: COMMERCIAL

## 2025-06-18 DIAGNOSIS — Z41.9 SURGERY, ELECTIVE: Primary | ICD-10-CM

## 2025-06-24 ENCOUNTER — OFFICE VISIT (OUTPATIENT)
Dept: FAMILY MEDICINE CLINIC | Facility: CLINIC | Age: 40
End: 2025-06-24
Payer: COMMERCIAL

## 2025-06-24 ENCOUNTER — TELEPHONE (OUTPATIENT)
Dept: FAMILY MEDICINE CLINIC | Facility: CLINIC | Age: 40
End: 2025-06-24

## 2025-06-24 VITALS
OXYGEN SATURATION: 95 % | HEART RATE: 72 BPM | HEIGHT: 67 IN | WEIGHT: 170 LBS | BODY MASS INDEX: 26.68 KG/M2 | DIASTOLIC BLOOD PRESSURE: 86 MMHG | SYSTOLIC BLOOD PRESSURE: 152 MMHG

## 2025-06-24 DIAGNOSIS — Z00.00 HEALTH MAINTENANCE EXAMINATION: Primary | ICD-10-CM

## 2025-06-24 DIAGNOSIS — M51.26 LUMBAR DISC HERNIATION: ICD-10-CM

## 2025-06-24 PROBLEM — M50.222 HERNIATION OF INTERVERTEBRAL DISC AT C5-C6 LEVEL: Status: RESOLVED | Noted: 2021-08-18 | Resolved: 2025-06-24

## 2025-06-24 PROCEDURE — 99396 PREV VISIT EST AGE 40-64: CPT | Performed by: NURSE PRACTITIONER

## 2025-06-24 NOTE — PATIENT INSTRUCTIONS
Discharge instructions continue healthy diet,  Lots of fiber,  Stay on top of your labs yearly labs yearly physical,  Presently, you are too limited with your disc herniation to return to work and likely this will exacerbate your pain and presently unable to do a limited job as well or restricted possibly you may be able to return earlier than planned with restricted duty but presently we will plan on returning to work full duty in 2 months this is likely when you can return to work full duty    But I will see you back in a month and if you are feeling much better we can update your plan much sooner possibly  Severe uncontrolled pain fever chills back pain weakness or saddleback paresthesias bowel or bladder incontinence or retention or increased gait difficulty emergency room immediately    Otherwise continue present plan continue physical therapy continue gabapentin,  If you stay on gabapentin see if pain management will assume care  Otherwise I will continue if need be    Let me know if there is anything you need or I can do for you please  FMLA forms completed today  Estimated return to work 8 weeks hopefully soon

## 2025-06-24 NOTE — PROGRESS NOTES
Chief Complaint  Annual Exam    Subjective        Abdi Tyler presents to [facility]  History of Present Illness  Pleasant gentleman he is here today for health maintenance, as well as a follow-up with a disc herniation, he has a severe lumbar disc herniation, lumbar radiculopathy without focal weakness, pain has been excruciating at times he is improved presently with a regimen but still has an appointment for another epidural next month  He has pain, especially if he sits, and sit to stand, just really has a lots of pain he is unable to do any persistent sitting standing repetitive motion, recently of gabapentin has helped, and had a follow-up with pain management, they are aware of the gabapentin I gave him just a few hydrocodone for excruciating pain should that occur      He has no bowel or bladder incontinence or retention no  saddlebag paresthesias\  Did see neurosurgery in the hospital at this time he is not a surgical candidate but if he is not improving this may change or if he develops any other associate symptoms such as weakness bowel or bladder change as discussed with patient today  And return to work as soon as possible but at this time he is not unable to do even light duty,  Is just sitting here in the office, he is not able to sit he is actually on the floor on his knees to avoid sitting with flexion of his hips as he is tired of standing there is no exaggerated actions here and everything is appropriate and in line with his diagnosis lumbar herniation    Takes care of himself no drug alcohol tobacco abuse  Family man, very dedicated to his family and his work,  Recently glucose was around 200 but this was after he had a big sugary drink before he went in I think as well as he has been taking steroids,  And this is likely the reason but will check an A1c as well as he had mildly elevated white blood cell count likely from prednisone or steroid  This will be  "rechecked as well  No family history of prostate or colon cancer        Objective   Vital Signs:  /86   Pulse 72   Ht 170.2 cm (67.01\")   Wt 77.1 kg (170 lb)   SpO2 95%   BMI 26.62 kg/m²   Estimated body mass index is 26.62 kg/m² as calculated from the following:    Height as of this encounter: 170.2 cm (67.01\").    Weight as of this encounter: 77.1 kg (170 lb).            Physical Exam  Vitals reviewed.   Constitutional:       Appearance: Normal appearance. He is well-developed. He is not ill-appearing.      Comments: Pleasant no distress smiling, appears healthy except for back   HENT:      Head: Normocephalic.      Right Ear: Tympanic membrane normal.      Left Ear: Tympanic membrane normal.      Nose: Nose normal.   Eyes:      General: No scleral icterus.     Conjunctiva/sclera: Conjunctivae normal.      Pupils: Pupils are equal, round, and reactive to light.   Neck:      Thyroid: No thyromegaly.      Vascular: No JVD.   Cardiovascular:      Rate and Rhythm: Normal rate and regular rhythm.      Heart sounds: Normal heart sounds. No murmur heard.     No friction rub. No gallop.   Pulmonary:      Effort: Pulmonary effort is normal. No respiratory distress.      Breath sounds: Normal breath sounds. No stridor. No wheezing or rales.   Abdominal:      General: Bowel sounds are normal. There is no distension.      Palpations: Abdomen is soft. There is no mass.      Tenderness: There is no abdominal tenderness. There is no rebound.      Comments: No hepatosplenomegaly, no ascites,   Musculoskeletal:         General: No tenderness.      Cervical back: Neck supple.      Comments: Straight leg raise positive on the left, but no focal weakness no plantar or dorsal weakness, straight leg raise, able to lift leg against resistance and equal bilaterally  No focal weakness,  Unable to stand or sit for long without pain, had to move around and now sitting on the floor appropriately with his knees flexed, to prevent " pain,  He had to squat on his knees and actually used his fingers behind him to help ease his heel in his shoe         Lymphadenopathy:      Cervical: No cervical adenopathy.   Skin:     General: Skin is warm and dry.      Capillary Refill: Capillary refill takes less than 2 seconds.      Findings: No erythema or rash.   Neurological:      General: No focal deficit present.      Mental Status: He is alert and oriented to person, place, and time. Mental status is at baseline.      Deep Tendon Reflexes: Reflexes are normal and symmetric.   Psychiatric:         Mood and Affect: Mood normal.         Behavior: Behavior normal.         Thought Content: Thought content normal.         Judgment: Judgment normal.      Comments: Appropriate pleasant for situation      Result Review :                Assessment and Plan   Diagnoses and all orders for this visit:    1. Health maintenance examination (Primary)  -     Hemoglobin A1c; Future  -     CBC & Differential; Future  -     Comprehensive Metabolic Panel; Future  -     Lipid Panel With LDL / HDL Ratio; Future  -     TSH Rfx On Abnormal To Free T4; Future  -     Urinalysis With Microscopic If Indicated (No Culture) - Urine, Clean Catch; Future    2. Lumbar disc herniation             Follow Up   Return in about 1 month (around 7/24/2025), or lab soon, for Print discharge instructions/educational handouts.  Patient was given instructions and counseling regarding his condition or for health maintenance advice. Please see specific information pulled into the AVS if appropriate.     Patient Instructions   Discharge instructions continue healthy diet,  Lots of fiber,  Stay on top of your labs yearly labs yearly physical,  Presently, you are too limited with your disc herniation to return to work and likely this will exacerbate your pain and presently unable to do a limited job as well or restricted possibly you may be able to return earlier than planned with restricted duty but  presently we will plan on returning to work full duty in 2 months this is likely when you can return to work full duty    But I will see you back in a month and if you are feeling much better we can update your plan much sooner possibly  Severe uncontrolled pain fever chills back pain weakness or saddleback paresthesias bowel or bladder incontinence or retention or increased gait difficulty emergency room immediately    Otherwise continue present plan continue physical therapy continue gabapentin,  If you stay on gabapentin see if pain management will assume care  Otherwise I will continue if need be    Let me know if there is anything you need or I can do for you please  FMLA forms completed today  Estimated return to work 8 weeks hopefully soon

## 2025-06-27 ENCOUNTER — TREATMENT (OUTPATIENT)
Dept: PHYSICAL THERAPY | Facility: CLINIC | Age: 40
End: 2025-06-27
Payer: COMMERCIAL

## 2025-06-27 DIAGNOSIS — M54.42 ACUTE LOW BACK PAIN WITH LEFT-SIDED SCIATICA, UNSPECIFIED BACK PAIN LATERALITY: ICD-10-CM

## 2025-06-27 DIAGNOSIS — M51.26 LUMBAR DISC HERNIATION: Primary | ICD-10-CM

## 2025-06-27 DIAGNOSIS — R68.89 ACTIVITY INTOLERANCE: ICD-10-CM

## 2025-06-27 NOTE — PROGRESS NOTES
Physical Therapy Initial Evaluation and Plan of Care  Clinic Location [address] [facility] [address], [place] [id number]    Patient: Abdi Tyler   : 1985  Diagnosis/ICD-10 Code:  Lumbar disc herniation [M51.26]  Referring practitioner: JONATHAN Coronado  Date of Initial Visit: 2025  Today's Date: 2025  Patient seen for 1 session         Visit Diagnoses:    ICD-10-CM ICD-9-CM   1. Lumbar disc herniation  M51.26 722.10   2. Acute low back pain with left-sided sciatica, unspecified back pain laterality  M54.42 724.2     724.3   3. Activity intolerance  R68.89 780.99         Subjective Questionnaire: Oswestry: 64%      Subjective Evaluation    History of Present Illness  Mechanism of injury: Per pt referral, pt states in March he had sudden acute onset of low back pain without precipitating trauma or event. His pain has been improving slighlty since he has been using an inversion table 3x/week. Pt reports primarliy L sided lumbar spine pain which radiates into the groin as well as posterior aspect of the buttock thigh terminating at the calf. Significantly increased with prolonged sitting greater that 20 minutes as well as lying down. Denies bladder or bowel dysfunction and also denies any saddle anasthesia. He did undergo an L5-S1 LESI with left paramedian approach on 2025 but denies any improvement of pain with that injection.    Today, he reports he was scheduled to come here a month ago but had to go to the ER due to some bulging discs. Still has them, this does not seem to get better. Voices he is unable to raise his legs when sitting and getting in and out of the car is very difficult. Gets a lot of burning at lower back at his last three vertebrae. Does get symptoms down the L leg. Reports symptoms have been going on for a few months. Pain has improved slightly since going to the hospital. He was walking with a cane and is not now. Sleep is very disturbed, can't sleep in  bed, has been sleeping on the floor. Reports symptoms began around the time he was having to do a lot of heavy lifting at work. Pt is taking pain medication to manage his symptoms and to help him sleep. Has to sleep on stomach.      Patient Occupation: Works in factory. Quality of life: fair    Pain  Current pain rating: 3  At best pain rating: 3  At worst pain ratin  Location: Low back/L LE  Quality: radiating, burning and sharp  Relieving factors: medications and change in position (uses inversion table)  Aggravating factors: standing, lifting, movement, ambulation, prolonged positioning, stairs and repetitive movement  Progression: no change    Social Support  Lives in: multiple-level home  Lives with: spouse and young children    Diagnostic Tests  MRI studies: abnormal    Treatments  Previous treatment: medication  Current treatment: physical therapy  Patient Goals  Patient goals for therapy: decreased pain, independence with ADLs/IADLs and return to sport/leisure activities (sleep better)         MRI 25   At L5-S1, there is mild disc space narrowing, disc desiccation, and  there is a 4 mm retrolisthesis of L5 with respect to S1 with unroofed  disc material diffusely bulging along the posterior superior endplate of  S1. In addition, there is a left posterolateral disc-subarticular disc  herniation with extruded disc material extending inferiorly along the  left posterior superior body and endplate of S1 that maximally measures  6 x 5 x 8 mm in mediolateral, anterior posterior and craniocaudal  dimensions that narrows the left lateral recess and presses on the  anteromedial aspect of the traversing left S1 nerve root and deviates it  posterolaterally and could account for an acute left S1 radiculopathy  and correlation with clinical exam is recommended. The remainder of  lumbar spine MRI is unremarkable.    Objective          Static Posture     Comments  Pt displayed lateral shift to R during sitting.  Even got on the ground to kneel during evaluation for pain relief.    Tenderness     Lumbar Spine  Tenderness in the spinous process.     Neurological Testing     Sensation     Lumbar   Left   Intact: light touch    Right   Intact: light touch    Active Range of Motion     Lumbar   Flexion: with pain  Extension: WFL and with pain  Left lateral flexion: WFL and with pain  Right lateral flexion: WFL  Left rotation: WFL  Right rotation: WFL  Left Hip   Normal active range of motion    Right Hip   Normal active range of motion    Additional Active Range of Motion Details  Lumbar Flexion = 15%      Strength/Myotome Testing     Left Hip   Planes of Motion   Flexion: 5 (pain)  Abduction: 5  Adduction: 5    Right Hip   Planes of Motion   Flexion: 5  Abduction: 5  Adduction: 5    Left Knee   Flexion: 5  Extension: 3+ (pain)    Right Knee   Flexion: 5  Extension: 5    Tests       Thoracic   Positive slump.     Lumbar     Left   Positive passive SLR.     Left Pelvic Girdle/Sacrum   Negative: sacrum compression and gapping.     Right Pelvic Girdle/Sacrum   Negative: sacrum compression, sacral spring and thigh thrust.     Left Hip   Negative Ely's, JULIO CESAR and FADIR.   90/90 SLR: Positive.     Right Hip   Negative Ely's, JULIO CESAR and FADIR.     Additional Tests Details  Passive SLR on L = 20 degrees with reproduction of symptoms.  Passive SLR on R = 35 degrees hamstring tightness.    Ambulation     Comments   Pt displayed slower ambulation speed with lateral sway. Limited L knee extension during IC and throughout gait cycle due to symptoms.          Assessment & Plan       Assessment  Impairments: abnormal coordination, abnormal or restricted ROM, activity intolerance, impaired physical strength, lacks appropriate home exercise program and pain with function   Functional limitations: uncomfortable because of pain, standing, stooping and unable to perform repetitive tasks   Assessment details: The patient is a 40 y.o. male who  presents to physical therapy today for low back pain and L leg pain. Upon initial evaluation, the patient demonstrates the following impairments: lumbar ROM deficits, LE strength deficits, positive special testing, neural tension, TTP, positional intolerance. Examination findings indicate S/S consistent with referring dx of lumbar radiculopathy due to a herniated disc.     Due to these impairments, the patient is unable to perform or has difficulty with the following functional tasks: bending over, lifting, walking, standing, personal care, work duties, and ADLs/household activities. The patient would benefit from skilled PT services to address functional limitations and impairments and to improve patient quality of life.      Prognosis: good    Goals  Plan Goals: ST. Pt will be independent and compliant with initial HEP in 4 weeks.  2. Pt will report a 30% improvement in symptoms since starting therapy in 4 weeks.  3. Pt will report pain level at worst <5 during household activity in 4 weeks.  4. Pt will show improvement in body mechanics when lifting and sitting in 2 weeks in order to decrease strain on back.     LT. Pt will be independent with final HEP for self-management of condition by DC.  2. Pt will improve score on Back Index to less than 30% by DC.   3. Pt will report a 75% improvement in symptoms by DC in order to allow return to PLOF.  4. Pt will improve lumbar flexion AROM to WNL in order to complete ADLs with improved function by DC.   5. Pt will report ability to put on socks and sleep in bed to demonstrate healing of affected structures and to return to PLOF.    Plan  Therapy options: will be seen for skilled therapy services  Planned modality interventions: dry needling, cryotherapy, iontophoresis, TENS, high voltage pulsed current (pain management), high voltage pulsed current (dermal wound therapy), high voltage pulsed current (spasm management), hydrotherapy, thermotherapy  (hydrocollator packs), ultrasound, traction, microcurrent electrical stimulation and electrical stimulation/Russian stimulation  Planned therapy interventions: abdominal trunk stabilization, strengthening, stretching, therapeutic activities, transfer training, spinal/joint mobilization, soft tissue mobilization, postural training, neuromuscular re-education, motor coordination training, manual therapy, ADL retraining, balance/weight-bearing training, body mechanics training, flexibility, functional ROM exercises, gait training, home exercise program, IADL retraining and joint mobilization  Other planned therapy interventions: Group Therapy  Frequency: 2x week  Duration in weeks: 12  Treatment plan discussed with: patient            Timed:         Manual Therapy:         mins  73899;     Therapeutic Exercise:         mins  74472;     Neuromuscular Kilo:    10    mins  91755;    Therapeutic Activity:     10     mins  10699;     Gait Training:           mins  30887;     Ultrasound:          mins  01646;    Ionto                                   mins   66893  Self Care                       8     mins   78601        Un-Timed:  Electrical Stimulation:         mins  99601 ( );  Dry Needling          mins self-pay  Traction          mins 34402  Low Eval     22     Mins  64177  Mod Eval          Mins  91491  High Eval                            Mins  52088  Canalith Repos         mins 70488        Timed Treatment:   28   mins   Total Treatment:     50   mins          PT: Ricardo Cooper PT     [place] License #: [id number]  Electronically signed by Ricardo Cooper PT, 06/27/25, 8:29 AM EDT    Certification Period: 6/27/2025 thru 9/24/2025  I certify that the therapy services are furnished while this patient is under my care.  The services outlined above are required by this patient, and will be reviewed every 90 days.         Physician Signature:__________________________________________________    PHYSICIAN: Lexx Ernst  JONATHAN Galindo  NPI: [id number]                                      DATE:      Please sign and return via fax to [email or web address] . Thank you, [facility].

## 2025-06-27 NOTE — PATIENT INSTRUCTIONS
Access Code: [email or web address]  URL: [email or web address]  Date: 06/27/2025  Prepared by: Ricardo Cooper    Exercises  - Standing Lumbar Extension  - 1 x daily - 7 x weekly - 3 sets - 10 reps  - Cat Cow  - 1 x daily - 7 x weekly - 3 sets - 10 reps  - Bird Dog  - 1 x daily - 7 x weekly - 3 sets - 10 reps - 3s hold  - Baby Cobra Hands Up   - 1 x daily - 7 x weekly - 3 sets - 10 reps

## 2025-07-01 ENCOUNTER — TREATMENT (OUTPATIENT)
Dept: PHYSICAL THERAPY | Facility: CLINIC | Age: 40
End: 2025-07-01
Payer: COMMERCIAL

## 2025-07-01 DIAGNOSIS — M54.42 ACUTE LOW BACK PAIN WITH LEFT-SIDED SCIATICA, UNSPECIFIED BACK PAIN LATERALITY: ICD-10-CM

## 2025-07-01 DIAGNOSIS — M51.26 LUMBAR DISC HERNIATION: Primary | ICD-10-CM

## 2025-07-01 DIAGNOSIS — R68.89 ACTIVITY INTOLERANCE: ICD-10-CM

## 2025-07-01 NOTE — PROGRESS NOTES
Physical Therapy Daily Treatment Note  [facility] [place]   [address]  [place] [address]  P: [phone]  F: [phone]    Patient: Abdi Tyler   : 1985  Referring practitioner: JONATHAN Coronado  Date of Initial Visit: Type: THERAPY  Noted: 2025  Today's Date: 2025  Patient seen for 2 sessions       Visit Diagnoses:    ICD-10-CM ICD-9-CM   1. Lumbar disc herniation  M51.26 722.10   2. Acute low back pain with left-sided sciatica, unspecified back pain laterality  M54.42 724.2     724.3   3. Activity intolerance  R68.89 780.99         Subjective     Abdi Tyler reports: Pain is mostly in L glute shooting down leg. Usually pain starts with this and the low back pain follows. Denies LBP currently.         Objective   See Exercise, Manual, and Modality Logs for complete treatment.       Assessment:  Pt performed all extension-based interventions this visit without increase in radicular or LBP s/s. Pt benefits from verbal cues for improved positioning with quadriped exercises. Trial of KT to lumbar spine applied at end of visit to provide more stability with daily activities, such as squatting and lifting for his job.         Plan:  Progress per Plan of Care            Timed:         Manual Therapy:    8     mins  92230;     Therapeutic Exercise:    24     mins  30723;     Neuromuscular Kilo:        mins  75806;    Therapeutic Activity:     10     mins  04169;     Gait Training:           mins  21904;     Ultrasound:          mins  45499;    Ionto                                   mins  45164  Self Care                            mins  42143    Un-Timed:  Electrical Stimulation:         mins  42249 ( );  Traction          mins 44420        Timed Treatment:   42   mins   Total Treatment:     42   mins      Jeffy Martinez, Physical Therapist Assistant  [place] License #: [id number]

## 2025-07-03 ENCOUNTER — TREATMENT (OUTPATIENT)
Dept: PHYSICAL THERAPY | Facility: CLINIC | Age: 40
End: 2025-07-03
Payer: COMMERCIAL

## 2025-07-03 DIAGNOSIS — M54.42 ACUTE LOW BACK PAIN WITH LEFT-SIDED SCIATICA, UNSPECIFIED BACK PAIN LATERALITY: ICD-10-CM

## 2025-07-03 DIAGNOSIS — M51.26 LUMBAR DISC HERNIATION: Primary | ICD-10-CM

## 2025-07-03 DIAGNOSIS — R68.89 ACTIVITY INTOLERANCE: ICD-10-CM

## 2025-07-03 NOTE — PROGRESS NOTES
Physical Therapy Daily Treatment Note  [facility] [place]   [address], [address]  [place] [address]  P: [phone]  F: [phone]    Patient: Abdi Tyler   : 1985  Referring practitioner: JONATHAN Coronado  Date of Initial Visit: Type: THERAPY  Noted: 2025  Today's Date: 7/3/2025  Patient seen for 3 sessions       Visit Diagnoses:    ICD-10-CM ICD-9-CM   1. Lumbar disc herniation  M51.26 722.10   2. Acute low back pain with left-sided sciatica, unspecified back pain laterality  M54.42 724.2     724.3   3. Activity intolerance  R68.89 780.99       Subjective     Abdi Tyler reports: has noticed some minor improvements. Back pain still gets him at times.    Objective   See Exercise, Manual, and Modality Logs for complete treatment.       Assessment:  Pt tolerated today's treatment session well with no adverse responses during treatment session. Pt continues to display limitations including low back pain impacting his tolerance to ADLs. HEP progressed today. Pt will benefit from continued skilled PT services.       Plan:  Progress per POC.         Timed:         Manual Therapy:         mins  69692;     Therapeutic Exercise:    9     mins  52348;     Neuromuscular Kilo:    23    mins  86893;    Therapeutic Activity:     8     mins  30484;     Gait Training:           mins  29118;     Ultrasound:          mins  07062;    Ionto                                   mins  08090  Self Care                            mins  48405  Traction          mins 55280      Un-Timed:  Canalith Repos         mins 10266  Electrical Stimulation:         mins  51940 ( );  Dry Needling          mins self-pay  Traction          mins 74829        Timed Treatment:   40   mins   Total Treatment:     40   mins    iRcardo Cooper, PT  [place] License #: [id number]    Physical Therapist

## 2025-07-09 ENCOUNTER — TREATMENT (OUTPATIENT)
Dept: PHYSICAL THERAPY | Facility: CLINIC | Age: 40
End: 2025-07-09
Payer: COMMERCIAL

## 2025-07-09 DIAGNOSIS — R68.89 ACTIVITY INTOLERANCE: ICD-10-CM

## 2025-07-09 DIAGNOSIS — M51.26 LUMBAR DISC HERNIATION: Primary | ICD-10-CM

## 2025-07-09 DIAGNOSIS — M54.42 ACUTE LOW BACK PAIN WITH LEFT-SIDED SCIATICA, UNSPECIFIED BACK PAIN LATERALITY: ICD-10-CM

## 2025-07-09 NOTE — PROGRESS NOTES
Physical Therapy Daily Treatment Note  [facility] [place]   [address]  [place] [address]  P: [phone]  F: [phone]    Patient: Abdi Tyler   : 1985  Referring practitioner: JONATHAN Coronado  Date of Initial Visit: Type: THERAPY  Noted: 2025  Today's Date: 2025  Patient seen for 4 sessions       Visit Diagnoses:    ICD-10-CM ICD-9-CM   1. Lumbar disc herniation  M51.26 722.10   2. Acute low back pain with left-sided sciatica, unspecified back pain laterality  M54.42 724.2     724.3   3. Activity intolerance  R68.89 780.99       Subjective     Abdi Tyler reports: he has been walking better without much pain. Still a lot of tightness behind L thigh. Unable to bend over. Reports difficulty putting on socks and shoes.    Objective   See Exercise, Manual, and Modality Logs for complete treatment.     Lumbar Flexion = 25%  Very rigid throughout lumbar spine.    Assessment:  Pt tolerated today's treatment session well with no adverse responses during treatment session. Pt continues to display limitations including limited lumbar mobility and core strength deficits. Hamstring stretch and sciatic nerve glide added to HEP today. Traction introduced today which pt subjectively reported no issues with. Pt will benefit from continued skilled PT services to improve lumbar ROM and sciatic nerve extensibility.       Plan:  Progress per POC.         Timed:         Manual Therapy:         mins  39733;     Therapeutic Exercise:    15     mins  87828;     Neuromuscular Kilo:    13    mins  56948;    Therapeutic Activity:     10     mins  08231;     Gait Training:           mins  75411;     Ultrasound:          mins  24603;    Ionto                                   mins  89224  Self Care                            mins  17782  Traction          mins 46759      Un-Timed:  Canalith Repos         mins 81763  Electrical Stimulation:         mins  56988  ( );  Dry Needling          mins self-pay  Traction     10     mins 93624        Timed Treatment:   38   mins   Total Treatment:     50   mins    Ricardo Cooper, PT  [place] License #: [id number]    Physical Therapist

## 2025-07-10 ENCOUNTER — HOSPITAL ENCOUNTER (OUTPATIENT)
Dept: GENERAL RADIOLOGY | Facility: SURGERY CENTER | Age: 40
Setting detail: HOSPITAL OUTPATIENT SURGERY
End: 2025-07-10
Payer: COMMERCIAL

## 2025-07-10 ENCOUNTER — HOSPITAL ENCOUNTER (OUTPATIENT)
Facility: SURGERY CENTER | Age: 40
Setting detail: HOSPITAL OUTPATIENT SURGERY
Discharge: HOME | End: 2025-07-10
Attending: ANESTHESIOLOGY | Admitting: ANESTHESIOLOGY
Payer: COMMERCIAL

## 2025-07-10 VITALS
OXYGEN SATURATION: 96 % | RESPIRATION RATE: 16 BRPM | HEART RATE: 62 BPM | TEMPERATURE: 98 F | DIASTOLIC BLOOD PRESSURE: 95 MMHG | SYSTOLIC BLOOD PRESSURE: 131 MMHG

## 2025-07-10 DIAGNOSIS — M54.16 LUMBAR RADICULOPATHY: ICD-10-CM

## 2025-07-10 DIAGNOSIS — Z41.9 SURGERY, ELECTIVE: ICD-10-CM

## 2025-07-10 PROCEDURE — 64483 NJX AA&/STRD TFRM EPI L/S 1: CPT | Performed by: ANESTHESIOLOGY

## 2025-07-10 PROCEDURE — 25010000002 MIDAZOLAM PER 1 MG: Performed by: ANESTHESIOLOGY

## 2025-07-10 PROCEDURE — 25510000001 IOPAMIDOL 61 % SOLUTION 30 ML VIAL: Performed by: ANESTHESIOLOGY

## 2025-07-10 PROCEDURE — 25010000002 DEXAMETHASONE SODIUM PHOSPHATE 100 MG/10ML SOLUTION 10 ML VIAL: Performed by: ANESTHESIOLOGY

## 2025-07-10 PROCEDURE — 25010000002 FENTANYL CITRATE (PF) 50 MCG/ML SOLUTION: Performed by: ANESTHESIOLOGY

## 2025-07-10 PROCEDURE — 77002 NEEDLE LOCALIZATION BY XRAY: CPT

## 2025-07-10 PROCEDURE — 25010000002 LIDOCAINE PF 1% 1 % SOLUTION 5 ML VIAL: Performed by: ANESTHESIOLOGY

## 2025-07-10 PROCEDURE — 25010000002 BUPIVACAINE (PF) 0.5 % SOLUTION 10 ML VIAL: Performed by: ANESTHESIOLOGY

## 2025-07-10 PROCEDURE — 64484 NJX AA&/STRD TFRM EPI L/S EA: CPT | Performed by: ANESTHESIOLOGY

## 2025-07-10 PROCEDURE — 76000 FLUOROSCOPY <1 HR PHYS/QHP: CPT

## 2025-07-10 RX ORDER — SODIUM CHLORIDE 0.9 % (FLUSH) 0.9 %
10 SYRINGE (ML) INJECTION EVERY 12 HOURS SCHEDULED
Status: DISCONTINUED | OUTPATIENT
Start: 2025-07-10 | End: 2025-07-10 | Stop reason: HOSPADM

## 2025-07-10 RX ORDER — SODIUM CHLORIDE 0.9 % (FLUSH) 0.9 %
10 SYRINGE (ML) INJECTION AS NEEDED
Status: DISCONTINUED | OUTPATIENT
Start: 2025-07-10 | End: 2025-07-10 | Stop reason: HOSPADM

## 2025-07-10 RX ORDER — FENTANYL CITRATE 50 UG/ML
50 INJECTION, SOLUTION INTRAMUSCULAR; INTRAVENOUS ONCE
Status: COMPLETED | OUTPATIENT
Start: 2025-07-10 | End: 2025-07-10

## 2025-07-10 RX ORDER — MIDAZOLAM HYDROCHLORIDE 1 MG/ML
4 INJECTION, SOLUTION INTRAMUSCULAR; INTRAVENOUS ONCE
Status: COMPLETED | OUTPATIENT
Start: 2025-07-10 | End: 2025-07-10

## 2025-07-10 RX ADMIN — MIDAZOLAM 4 MG: 1 INJECTION INTRAMUSCULAR; INTRAVENOUS at 09:38

## 2025-07-10 RX ADMIN — FENTANYL CITRATE 50 MCG: 50 INJECTION INTRAMUSCULAR; INTRAVENOUS at 09:38

## 2025-07-10 NOTE — OP NOTE
LTFESI with S1 TFESI  [facility]    PREOPERATIVE DIAGNOSIS:   left Lumbar Radiculopathy    POSTOPERATIVE DIAGNOSIS: Same as preop dx    PROCEDURE:    12248 --  Diagnostic  Transforaminal Epidural Steroid Injection at the  Left  L5 Level  20131 -- S1 Transforaminal Epidural Steroid Injection on the Left    PRE-PROCEDURE DISCUSSION WITH PATIENT:    Risks and complications were discussed with the patient prior to starting the procedure and informed consent was obtained.    SURGEON:  Arnoldo Stevens MD    REASON FOR PROCEDURE:     Degenerative changes are noted in the area. and Radiating pattern of pain is likely consistent with degenerative changes in the area.    SEDATION:  Versed 4mg & Fentanyl 50 mcg IV and The patient had higher than average levels of procedural anxiety and the need to provide additional procedural sedation was needed to safely proceed.  ANESTHETIC: Marcaine 0.25%  STEROID:     Dexamethasone 10mg  TOTAL VOLUME OF SOLUTION:   4mL    DESCRIPTON OF PROCEDURE:  After obtaining informed consent, an I.V. was started in the preoperative area. The patient taken to the operating room and was placed in the prone position with a pillow under the abdomen.  All pressure points were well padded.  EKG, blood pressure, and pulse oximeter were monitored.  The lumbosacral area was prepped with Chloraprep and draped in a sterile fashion. Under fluoroscopic guidance in an oblique dimension, the transverse process of the above mentioned Lumbar vertebra at the junction of the body at 6 o'clock position respective to the pedicle on the aforementioned side was identified. Skin and subcutaneous tissue was anesthetized with 2-3mL of 1% lidocaine. A 22-gauge spinal needle was introduced under fluoroscopic guidance at the above junction into the foramen without parasthesias and into the epidural space. After confirming the position of the needle with PA, lateral, and oblique fluoroscopic views, aspiration  was checked and was clear of blood or CSF.  Next, 1 mL of contrast dye was injected. After seeing adequate spread on the corresponding nerve root, a total volume 2 mL of injectate containing half of the previously mentioned local anesthetic solution was slowly and easily injected into the space.    The needle was removed intact.  Vital signs were stable.      Next, the S1 posterior foramen was identified on the above mentioned side with fluoroscopy in a PA dimension, and a spinal needle was introduced to just caudad to the 6 o’clock position of the foramen until contact with os; then the needle was walked off cephalad and into the foramen.   After confirming the position of the needle with PA and lateral fluoroscopic views, aspiration was checked and was clear of blood or CSF.  Next, 1 mL of contrast dye was injected. After seeing adequate spread on the S1 nerve root and into the caudal epidural space, a total volume 2mL of injectate containing the other half of the local anesthetic solution was easily and slowly injected into the epidural space.   The needle was removed intact.  Vital signs were stable.  Onset of analgesia was noted prior to transport to the recovery area.      ESTIMATED BLOOD LOSS:  <5 mL  SPECIMENS:  none    COMPLICATIONS:   No complications were noted., There was no indication of vascular uptake on live injection of contrast dye., There was no indication of intrathecal uptake on live injection of contrast dye., There was not any evidence of dural puncture.  , and The patient did not have any signs of postprocedure numbness nor weakness.    TOLERANCE & DISCHARGE CONDITION:    The patient tolerated the procedure well.  The patient was transported to the recovery area without difficulties.  The patient was discharged to home under the care of family in stable and satisfactory condition.    PLAN OF CARE:  The patient was given our standard instruction sheet.  The patient will Return to clinic 4-6  wks  The patient will resume all medications as per the medication reconciliation sheet.

## 2025-07-10 NOTE — DISCHARGE INSTRUCTIONS
[facility] - Post-procedure Instructions          --  While there are no absolute restrictions, it is recommended that you do not perform strenuous activity today. In the morning, you may resume your level of activity as before your block.    --  If you have a band-aid at your injection site, please remove it later today. Observe the area for any redness, swelling, pus-like drainage, or a temperature over 101°. If any of these symptoms occur, please call your doctor at [phone]. If after office hours, leave a message and the on-call provider will return your call.    --  Ice may be applied to your injection site. It is recommended you avoid direct heat (heating pad; hot tub) for 1-2 days.    --  Call [facility] at [phone] if you experience persistent headache, persistent bleeding from the injection site, or severe pain not relieved by heat or oral medication.    --  Do not make important decisions today.    --  Due to the effects of the block and/or the I.V. Sedation, DO NOT drive or operate hazardous machinery for 12 hours.  Local anesthetics may cause numbness after procedure and precautions must be taken with regards to operating equipment as well as with walking, even if ambulating with assistance of another person or with an assistive device.    --  Do not drink alcohol for 12 hours.    -- You may return to work tomorrow, or as directed by your referring doctor.    --  Occasionally you may notice a slight increase in your pain after the procedure. This should start to improve within the next 24-48 hours. Radiofrequency ablation procedure pain may last 3-4 weeks.    --  It may take as long as 3-4 days before you notice a gradual improvement in your pain and/or other symptoms.    -- You may continue to take your prescribed pain medication as needed.    --  Some normal possible side effects of steroid use could include fluid retention, increased blood sugar, dull headache,  increased sweating, increased appetite, mood swings and flushing.    --  Diabetics are recommended to watch their blood glucose level closely for 24-48 hours after the injection.    --  Must stay in PACU for 20 min upon arrival and prove no leg weakness before being discharged.    --  IN THE EVENT OF A LIFE THREATENING EMERGENCY, (CHEST PAIN, BREATHING DIFFICULTIES, PARALYSIS…) YOU SHOULD GO TO YOUR NEAREST EMERGENCY ROOM.    --  You should be contacted by our office within 2-3 days to schedule follow up or next appointment date.  If not contacted within 7 days, please call the office at [phone]

## 2025-07-14 ENCOUNTER — TREATMENT (OUTPATIENT)
Dept: PHYSICAL THERAPY | Facility: CLINIC | Age: 40
End: 2025-07-14
Payer: COMMERCIAL

## 2025-07-14 DIAGNOSIS — R68.89 ACTIVITY INTOLERANCE: ICD-10-CM

## 2025-07-14 DIAGNOSIS — M54.42 ACUTE LOW BACK PAIN WITH LEFT-SIDED SCIATICA, UNSPECIFIED BACK PAIN LATERALITY: ICD-10-CM

## 2025-07-14 DIAGNOSIS — M51.26 LUMBAR DISC HERNIATION: Primary | ICD-10-CM

## 2025-07-14 PROCEDURE — 97112 NEUROMUSCULAR REEDUCATION: CPT

## 2025-07-14 PROCEDURE — 97012 MECHANICAL TRACTION THERAPY: CPT

## 2025-07-14 PROCEDURE — 97110 THERAPEUTIC EXERCISES: CPT

## 2025-07-14 PROCEDURE — 97530 THERAPEUTIC ACTIVITIES: CPT

## 2025-07-14 NOTE — PROGRESS NOTES
Re-Assessment / Re-Certification    Time In 1400     Time Out 1452    Patient: Abdi Tyler   : 1985  Diagnosis/ICD-10 Code:  Lumbar disc herniation [M51.26]  Referring practitioner: JONATHAN Coronado  Date of Initial Visit: Type: THERAPY  Noted: 2025  Today's Date: 2025  Patient seen for 5 sessions      Subjective:   Abdi Tyler reports: increased pain for a couple days following his recent injections, but it is now feeling better. Overall, is feeling better which he reports is due to his PT. Sleep is still affected intermittently. Is able to walk about 1.5 miles with minor exacerbations that were manageable. Pain was worse with walking downhill. Reports he feels about 65-70% better and is putting his socks on easier now. Pain has maxed out at a 4/10 which is an improvement, and pain will calm down to his usual 2-3/10 after about an hour. Felt relief following traction last session for about 3 days. Still can't sleep in bed.  Subjective Questionnaire: Oswestry: 50%  Clinical Progress: improved  Home Program Compliance: Yes  Treatment has included: therapeutic exercise, neuromuscular re-education, manual therapy, therapeutic activity, and cryotherapy       Objective          Active Range of Motion     Lumbar   Flexion: with pain  Extension: WFL  Left lateral flexion: WFL  Right lateral flexion: WFL  Left rotation: WFL  Right rotation: WFL    Additional Active Range of Motion Details  Fingertips to knees with lumbar flexion. Very minimal movement occurring throughout lumbar spine.    Strength/Myotome Testing     Left Knee   Flexion: 4+  Extension: 4+ (pain)    Right Knee   Flexion: 4+  Extension: 4+     General Comments     Lumbar Comments  Passive SLR on L = 40 degrees with reproduction of symptoms.  Passive SLR on R = 40 degrees hamstring tightness.       Assessment/Plan  Pt tolerated today's treatment session well with no adverse responses during treatment session. Pt continues to  display limitations including lumbar ROM deficits with radicular symptoms impacting his QoL and tolerance to ADLs. Lumbar flexion exercises prescribed today and pt was introduced into very low weight dead lifting which he still does not tolerate quite well. He has made progress evident by objective measures and subjective reports. Pt will benefit from continued skilled PT services.     Progress toward previous goals: Partially Met    Goals  Plan Goals: ST. Pt will be independent and compliant with initial HEP in 4 weeks. (Met)  2. Pt will report a 30% improvement in symptoms since starting therapy in 4 weeks. (Met)  3. Pt will report pain level at worst <5 during household activity in 4 weeks. (Met)  4. Pt will show improvement in body mechanics when lifting and sitting in 2 weeks in order to decrease strain on back.  (Progressing)     LT. Pt will be independent with final HEP for self-management of condition by DC.  2. Pt will improve score on Back Index to less than 30% by DC.   3. Pt will report a 75% improvement in symptoms by DC in order to allow return to PLOF.  4. Pt will improve lumbar flexion AROM to WNL in order to complete ADLs with improved function by DC.   5. Pt will report ability to put on socks and sleep in bed to demonstrate healing of affected structures and to return to PLOF.  (All LTG not met)      Recommendations: Continue as planned  Timeframe: 1 month  Prognosis to achieve goals: good    PT Signature: Ricardo Cooper, PT  [place] License #: [id number]    Based upon review of the patient's progress and continued therapy plan, it is my medical opinion that Abdi Tyler should continue physical therapy treatment at [facility]  [address] [place] [id number] [phone].    Signature: __________________________________  Lexx Ernst PA    Manual Therapy:         mins  48209;  Therapeutic Exercise:    8     mins  20216;      Neuromuscular Kilo:    9    mins  85157;    Therapeutic Activity:     20     mins  27290;     Gait Training:           mins  53500;     Ultrasound:          mins  87955;    Electrical Stimulation:         mins  08949 ( );  Dry Needling          mins self-pay  Traction  __10___ mins 96135    Timed Treatment:   37   mins   Total Treatment:     52   mins

## 2025-07-17 ENCOUNTER — TREATMENT (OUTPATIENT)
Dept: PHYSICAL THERAPY | Facility: CLINIC | Age: 40
End: 2025-07-17
Payer: COMMERCIAL

## 2025-07-17 ENCOUNTER — OFFICE VISIT (OUTPATIENT)
Dept: FAMILY MEDICINE CLINIC | Facility: CLINIC | Age: 40
End: 2025-07-17
Payer: COMMERCIAL

## 2025-07-17 VITALS
SYSTOLIC BLOOD PRESSURE: 128 MMHG | OXYGEN SATURATION: 98 % | BODY MASS INDEX: 26.68 KG/M2 | DIASTOLIC BLOOD PRESSURE: 86 MMHG | WEIGHT: 170 LBS | HEART RATE: 66 BPM | HEIGHT: 67 IN

## 2025-07-17 DIAGNOSIS — M51.26 LUMBAR DISC HERNIATION: Primary | ICD-10-CM

## 2025-07-17 DIAGNOSIS — R68.89 ACTIVITY INTOLERANCE: ICD-10-CM

## 2025-07-17 DIAGNOSIS — M54.42 ACUTE LOW BACK PAIN WITH LEFT-SIDED SCIATICA, UNSPECIFIED BACK PAIN LATERALITY: ICD-10-CM

## 2025-07-17 DIAGNOSIS — M54.16 LUMBAR RADICULOPATHY: ICD-10-CM

## 2025-07-17 PROCEDURE — 99213 OFFICE O/P EST LOW 20 MIN: CPT | Performed by: NURSE PRACTITIONER

## 2025-07-17 RX ORDER — METHYLPREDNISOLONE 4 MG/1
TABLET ORAL
Qty: 21 TABLET | Refills: 0 | Status: SHIPPED | OUTPATIENT
Start: 2025-07-17

## 2025-07-17 NOTE — PROGRESS NOTES
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, since severe substantial pain, he has had some improvement but still has substantial pain recent epidurals really did not help continue his therapy, he wants to go back to work cannot do this right now he might can try to go back to light duty in a few weeks he has a follow-up in couple weeks in August,  He will bring in [place] has been on short-term disability he still has to stand and squat, standing hurts sitting hurts squatting relieves,  No focal weakness no fever no unexplained weight loss no night sweats  Mixed results from steroids, but no recent pack he did have some improvement  At this time is not a surgical candidate,  Hopeful for therapy is very motivated for his health, is wondering if he could not change Celebrex possibility        Objective   Vital Signs:  /86   Pulse 66   Ht 170.2 cm (67.01\")   Wt 77.1 kg (170 lb)   SpO2 98%   BMI 26.62 kg/m²   Estimated body mass index is 26.62 kg/m² as calculated from the following:    Height as of this encounter: 170.2 cm (67.01\").    Weight as of this encounter: 77.1 kg (170 lb).            Physical Exam  Vitals reviewed.   Constitutional:       General: He is not in acute distress.     Appearance: Normal appearance. He is well-developed. He is not ill-appearing, toxic-appearing or diaphoretic.   HENT:      Head: Normocephalic.      Nose: Nose normal.   Eyes:      General: No scleral icterus.     Conjunctiva/sclera: Conjunctivae normal.      Pupils: Pupils are equal, round, and reactive to light.   Neck:      " Thyroid: No thyromegaly.      Vascular: No JVD.   Cardiovascular:      Rate and Rhythm: Normal rate and regular rhythm.      Heart sounds: Normal heart sounds. No murmur heard.     No friction rub. No gallop.   Pulmonary:      Effort: Pulmonary effort is normal. No respiratory distress.      Breath sounds: Normal breath sounds. No stridor. No wheezing or rales.   Abdominal:      General: Bowel sounds are normal. There is no distension.      Palpations: Abdomen is soft.      Tenderness: There is no abdominal tenderness.      Comments: No hepatosplenomegaly, no ascites,   Musculoskeletal:         General: No tenderness.      Cervical back: Neck supple.      Comments: Deferred straight leg  exam no focal weakness patient standing,  With squat on the floor, has hurt with sitting and standing no focal weakness   Lymphadenopathy:      Cervical: No cervical adenopathy.   Skin:     General: Skin is warm and dry.      Capillary Refill: Capillary refill takes less than 2 seconds.      Findings: No erythema or rash.   Neurological:      General: No focal deficit present.      Mental Status: He is alert and oriented to person, place, and time. Mental status is at baseline.      Deep Tendon Reflexes: Reflexes are normal and symmetric.   Psychiatric:         Mood and Affect: Mood normal.         Behavior: Behavior normal.         Thought Content: Thought content normal.         Judgment: Judgment normal.        Result Review :                Assessment and Plan   Diagnoses and all orders for this visit:    1. Lumbar disc herniation (Primary)    2. Lumbar radiculopathy    Other orders  -     methylPREDNISolone (MEDROL) 4 MG dose pack; Take as directed on package instructions.  Dispense: 21 tablet; Refill: 0             Follow Up   Return Recheck 3 weeks but, for Print discharge instructions/educational handouts, Labs today.  Patient was given instructions and counseling regarding his condition or for health maintenance advice.  Please see specific information pulled into the AVS if appropriate.     Patient Instructions   Discharge instructions labs today    Continue physical therapy  Continue changing position ergonomics  Keep your follow-up appointment and bring FMLA paperwork with you  Maybe talk to your workplace and see what type of light duties they may have available for you and this will help me write restrictions as well                Gabapentin if needed anti-inflammatory Celebrex risk-benefit lowest effective dose Medrol  Not concurrently hold Celebrex  Follow instructions below

## 2025-07-17 NOTE — PATIENT INSTRUCTIONS
Discharge instructions labs today    Continue physical therapy  Continue changing position ergonomics  Keep your follow-up appointment and bring FMLA paperwork with you  Maybe talk to your workplace and see what type of light duties they may have available for you and this will help me write restrictions as well

## 2025-07-17 NOTE — PROGRESS NOTES
Physical Therapy Daily Treatment Note  [facility] [place]   [address]  [place] [address]  P: [phone]  F: [phone]    Patient: Abdi Tyler   : 1985  Referring practitioner: JONATHAN Coronado  Date of Initial Visit: Type: THERAPY  Noted: 2025  Today's Date: 2025  Patient seen for 6 sessions       Visit Diagnoses:    ICD-10-CM ICD-9-CM   1. Lumbar disc herniation  M51.26 722.10   2. Acute low back pain with left-sided sciatica, unspecified back pain laterality  M54.42 724.2     724.3   3. Activity intolerance  R68.89 780.99       Subjective     Abdi Tyler reports: soreness after dead lifts last session, but has since returned to baseline. Flexion stretch with foot elevated has been helpful with putting shoes and socks on. Reports continued benefit from traction, more so than his at home inversion table. Prolonged sitting exacerbates pain in L buttock.    Objective   See Exercise, Manual, and Modality Logs for complete treatment.       Assessment:  Pt tolerated today's treatment session well with no adverse responses during treatment session. Pt continues to display limitations including lumbar flexion deficits and sciatic nerve tension on the L impacting his tolerance to ADLs and his overall QoL. Pt will benefit from continued skilled PT services.       Plan:  Progress per POC.         Timed:         Manual Therapy:         mins  08362;     Therapeutic Exercise:    9     mins  96729;     Neuromuscular Kilo:    23    mins  92566;    Therapeutic Activity:     8     mins  48434;     Gait Training:           mins  65682;     Ultrasound:          mins  70377;    Ionto                                   mins  40537  Self Care                            mins  09466  Traction          mins 37951      Un-Timed:  Canalith Repos         mins 60182  Electrical Stimulation:         mins  44396 ( );  Dry Needling          mins  self-pay  Traction          mins 66402        Timed Treatment:   40   mins   Total Treatment:     40   mins    Ricardo Cooper, PT  [place] License #: [id number]    Physical Therapist

## 2025-07-21 ENCOUNTER — RESULTS FOLLOW-UP (OUTPATIENT)
Dept: FAMILY MEDICINE CLINIC | Facility: CLINIC | Age: 40
End: 2025-07-21
Payer: COMMERCIAL

## 2025-07-22 ENCOUNTER — TREATMENT (OUTPATIENT)
Dept: PHYSICAL THERAPY | Facility: CLINIC | Age: 40
End: 2025-07-22
Payer: COMMERCIAL

## 2025-07-22 DIAGNOSIS — M54.42 ACUTE LOW BACK PAIN WITH LEFT-SIDED SCIATICA, UNSPECIFIED BACK PAIN LATERALITY: ICD-10-CM

## 2025-07-22 DIAGNOSIS — R68.89 ACTIVITY INTOLERANCE: ICD-10-CM

## 2025-07-22 DIAGNOSIS — M51.26 LUMBAR DISC HERNIATION: Primary | ICD-10-CM

## 2025-07-22 PROCEDURE — 97110 THERAPEUTIC EXERCISES: CPT

## 2025-07-22 PROCEDURE — 97530 THERAPEUTIC ACTIVITIES: CPT

## 2025-07-22 PROCEDURE — 97012 MECHANICAL TRACTION THERAPY: CPT

## 2025-07-22 PROCEDURE — 97112 NEUROMUSCULAR REEDUCATION: CPT

## 2025-07-22 NOTE — PROGRESS NOTES
Physical Therapy Daily Treatment Note  [facility] [place]   [address]  [place] [address]  P: [phone]  F: [phone]    Patient: Abdi Tyler   : 1985  Referring practitioner: JONATHAN Coronado  Date of Initial Visit: Type: THERAPY  Noted: 2025  Today's Date: 2025  Patient seen for 7 sessions       Visit Diagnoses:    ICD-10-CM ICD-9-CM   1. Lumbar disc herniation  M51.26 722.10   2. Acute low back pain with left-sided sciatica, unspecified back pain laterality  M54.42 724.2     724.3   3. Activity intolerance  R68.89 780.99       Subjective     Abdi Tyler reports: drove to [facility] and walked around for an hour with his family, which caused a severe flare-up. Saw a chiropractor who was able to give him relief with some adjustments.    Objective   See Exercise, Manual, and Modality Logs for complete treatment.       Assessment:  Pt tolerated today's treatment session well with no adverse responses during treatment session. Pt continues to display limitations including lumbar ROM deficits but his mobility appears to be improving. Pt felt benefit from child pose stretch today with added thoracic rotations. Traction continues to benefit pt as well based on subjective reports. Pt will benefit from continued skilled PT services.       Plan:  Progress per POC.         Timed:         Manual Therapy:         mins  15907;     Therapeutic Exercise:    9     mins  48124;     Neuromuscular Kilo:    27    mins  95331;    Therapeutic Activity:     8     mins  08748;     Gait Training:           mins  98845;     Ultrasound:          mins  68900;    Ionto                                   mins  42565  Self Care                            mins  22985  Traction          mins 11874      Un-Timed:  Canalith Repos         mins 19060  Electrical Stimulation:         mins  73487 ( );  Dry Needling          mins self-pay  Traction     10      mins 76012        Timed Treatment:   44   mins   Total Treatment:     60   mins    Ricardo Cooper, PT  [place] License #: [id number]    Physical Therapist

## 2025-07-25 ENCOUNTER — TREATMENT (OUTPATIENT)
Dept: PHYSICAL THERAPY | Facility: CLINIC | Age: 40
End: 2025-07-25
Payer: COMMERCIAL

## 2025-07-25 DIAGNOSIS — R68.89 ACTIVITY INTOLERANCE: ICD-10-CM

## 2025-07-25 DIAGNOSIS — M51.26 LUMBAR DISC HERNIATION: Primary | ICD-10-CM

## 2025-07-25 DIAGNOSIS — M54.16 LUMBAR RADICULOPATHY, ACUTE: ICD-10-CM

## 2025-07-25 DIAGNOSIS — M51.26 LUMBAR DISC HERNIATION: ICD-10-CM

## 2025-07-25 DIAGNOSIS — M54.42 ACUTE LOW BACK PAIN WITH LEFT-SIDED SCIATICA, UNSPECIFIED BACK PAIN LATERALITY: ICD-10-CM

## 2025-07-25 RX ORDER — GABAPENTIN 300 MG/1
600 CAPSULE ORAL 3 TIMES DAILY
Qty: 180 CAPSULE | Refills: 0 | Status: SHIPPED | OUTPATIENT
Start: 2025-07-25

## 2025-07-25 NOTE — PROGRESS NOTES
Physical Therapy Daily Treatment Note  [facility] [place]   [address]  [place] [address]  P: [phone]  F: [phone]    Patient: Abdi Tyler   : 1985  Referring practitioner: JONATHAN Coronado  Date of Initial Visit: Type: THERAPY  Noted: 2025  Today's Date: 2025  Patient seen for 8 sessions       Visit Diagnoses:    ICD-10-CM ICD-9-CM   1. Lumbar disc herniation  M51.26 722.10   2. Acute low back pain with left-sided sciatica, unspecified back pain laterality  M54.42 724.2     724.3   3. Activity intolerance  R68.89 780.99       Subjective     Abdi Tyler reports: continuing to have pain/symptoms in L gluteal. Continued benefit from traction.    Objective   See Exercise, Manual, and Modality Logs for complete treatment.       Assessment:  Pt tolerated today's treatment session well with no adverse responses during treatment session. Pt continues to display limitations including lumbar ROM deficits but is showing signs of improvement. Piriformis stretch updated today on HEP. Side steps introduced as well today. Pt will benefit from continued skilled PT services.       Plan:  Progress per POC.         Timed:         Manual Therapy:         mins  28100;     Therapeutic Exercise:    13     mins  96555;     Neuromuscular Kilo:    10    mins  54055;    Therapeutic Activity:     10     mins  62075;     Gait Training:           mins  89125;     Ultrasound:          mins  67284;    Ionto                                   mins  13937  Self Care                            mins  25850  Traction          mins 02497      Un-Timed:  Canalith Repos         mins 36971  Electrical Stimulation:         mins  16264 ( );  Dry Needling          mins self-pay  Traction     10     mins 03603        Timed Treatment:   33   mins   Total Treatment:     45   mins    Ricardo Cooper PT  [place] License #: [id number]    Physical Therapist

## 2025-07-25 NOTE — TELEPHONE ENCOUNTER
Rx Refill Note  Requested Prescriptions     Pending Prescriptions Disp Refills    gabapentin (NEURONTIN) 300 MG capsule 540 capsule 1     Sig: Take 2 capsules by mouth 3 (Three) Times a Day. For nerve pain, caution sedation falls      Last office visit with prescribing clinician: 7/17/2025   Last telemedicine visit with prescribing clinician: Visit date not found   Next office visit with prescribing clinician: 8/11/2025                         Would you like a call back once the refill request has been completed: [] Yes [] No    If the office needs to give you a call back, can they leave a voicemail: [] Yes [] No    Lillian Arroyo Rep  07/25/25, 09:44 EDT

## 2025-07-28 ENCOUNTER — TREATMENT (OUTPATIENT)
Dept: PHYSICAL THERAPY | Facility: CLINIC | Age: 40
End: 2025-07-28
Payer: COMMERCIAL

## 2025-07-28 DIAGNOSIS — R68.89 ACTIVITY INTOLERANCE: ICD-10-CM

## 2025-07-28 DIAGNOSIS — M54.42 ACUTE LOW BACK PAIN WITH LEFT-SIDED SCIATICA, UNSPECIFIED BACK PAIN LATERALITY: ICD-10-CM

## 2025-07-28 DIAGNOSIS — M51.26 LUMBAR DISC HERNIATION: Primary | ICD-10-CM

## 2025-07-28 PROCEDURE — 97112 NEUROMUSCULAR REEDUCATION: CPT

## 2025-07-28 PROCEDURE — 97530 THERAPEUTIC ACTIVITIES: CPT

## 2025-07-28 PROCEDURE — 97110 THERAPEUTIC EXERCISES: CPT

## 2025-07-28 NOTE — PROGRESS NOTES
Physical Therapy Daily Treatment Note  [facility] [place]   [address]  [place] [address]  P: [phone]  F: [phone]    Patient: Abdi Tyler   : 1985  Referring practitioner: JONATHAN Coronado  Date of Initial Visit: Type: THERAPY  Noted: 2025  Today's Date: 2025  Patient seen for 9 sessions       Visit Diagnoses:    ICD-10-CM ICD-9-CM   1. Lumbar disc herniation  M51.26 722.10   2. Acute low back pain with left-sided sciatica, unspecified back pain laterality  M54.42 724.2     724.3   3. Activity intolerance  R68.89 780.99       Subjective     Abdi Tyler reports: feeling better. Went to chiropractor this AM. Low back movement is still limited, but is a lot better than it initially was. Has one more session scheduled before his return to work.    Objective   See Exercise, Manual, and Modality Logs for complete treatment.       Assessment:  Pt tolerated today's treatment session well with no adverse responses during treatment session. Pt continues to display limitations including low back mobility deficits but has progressed a lot. He is now tolerating dead lifting 20lbs. Will return to work next week on light-duty, but does need to lift upwards of 70lbs at work. Pt will benefit from continued skilled PT services.       Plan:  Progress per POC.         Timed:         Manual Therapy:         mins  89841;     Therapeutic Exercise:     8    mins  53502;     Neuromuscular Kilo:    24    mins  44522;    Therapeutic Activity:     8     mins  92446;     Gait Training:           mins  90520;     Ultrasound:          mins  02616;    Ionto                                   mins  74664  Self Care                            mins  41227  Traction          mins 75585      Un-Timed:  Canalith Repos         mins 17506  Electrical Stimulation:         mins  48539 ( );  Dry Needling          mins self-pay  Traction          mins  81982        Timed Treatment:   40   mins   Total Treatment:     40   mins    Ricardo Cooper, PT  [place] License #: [id number]    Physical Therapist

## 2025-08-04 ENCOUNTER — TREATMENT (OUTPATIENT)
Dept: PHYSICAL THERAPY | Facility: CLINIC | Age: 40
End: 2025-08-04
Payer: COMMERCIAL

## 2025-08-04 DIAGNOSIS — M51.26 LUMBAR DISC HERNIATION: Primary | ICD-10-CM

## 2025-08-04 DIAGNOSIS — R68.89 ACTIVITY INTOLERANCE: ICD-10-CM

## 2025-08-04 DIAGNOSIS — M54.42 ACUTE LOW BACK PAIN WITH LEFT-SIDED SCIATICA, UNSPECIFIED BACK PAIN LATERALITY: ICD-10-CM

## 2025-08-04 PROCEDURE — 97164 PT RE-EVAL EST PLAN CARE: CPT

## 2025-08-04 PROCEDURE — 97535 SELF CARE MNGMENT TRAINING: CPT

## 2025-08-04 PROCEDURE — 97112 NEUROMUSCULAR REEDUCATION: CPT

## 2025-08-07 ENCOUNTER — OFFICE VISIT (OUTPATIENT)
Dept: PAIN MEDICINE | Facility: CLINIC | Age: 40
End: 2025-08-07
Payer: COMMERCIAL

## 2025-08-07 ENCOUNTER — OFFICE VISIT (OUTPATIENT)
Dept: FAMILY MEDICINE CLINIC | Facility: CLINIC | Age: 40
End: 2025-08-07
Payer: COMMERCIAL

## 2025-08-07 VITALS
HEIGHT: 67 IN | DIASTOLIC BLOOD PRESSURE: 90 MMHG | BODY MASS INDEX: 26.78 KG/M2 | HEART RATE: 86 BPM | WEIGHT: 170.6 LBS | OXYGEN SATURATION: 98 % | SYSTOLIC BLOOD PRESSURE: 132 MMHG

## 2025-08-07 VITALS
OXYGEN SATURATION: 98 % | BODY MASS INDEX: 26.49 KG/M2 | DIASTOLIC BLOOD PRESSURE: 100 MMHG | HEART RATE: 63 BPM | SYSTOLIC BLOOD PRESSURE: 142 MMHG | HEIGHT: 67 IN | TEMPERATURE: 96.9 F | WEIGHT: 168.8 LBS

## 2025-08-07 DIAGNOSIS — M51.26 LUMBAR DISC HERNIATION: Primary | ICD-10-CM

## 2025-08-07 DIAGNOSIS — M54.16 LUMBAR RADICULOPATHY: ICD-10-CM

## 2025-08-07 DIAGNOSIS — M54.16 LUMBAR RADICULOPATHY, ACUTE: ICD-10-CM

## 2025-08-07 DIAGNOSIS — M51.26 DISPLACEMENT OF LUMBAR INTERVERTEBRAL DISC WITHOUT MYELOPATHY: ICD-10-CM

## 2025-08-07 PROCEDURE — 99212 OFFICE O/P EST SF 10 MIN: CPT | Performed by: PHYSICIAN ASSISTANT

## 2025-08-07 PROCEDURE — 99213 OFFICE O/P EST LOW 20 MIN: CPT | Performed by: NURSE PRACTITIONER

## 2025-08-11 DIAGNOSIS — M51.26 LUMBAR DISC HERNIATION: ICD-10-CM

## 2025-08-11 DIAGNOSIS — M54.16 LUMBAR RADICULOPATHY, ACUTE: ICD-10-CM

## 2025-08-11 RX ORDER — CELECOXIB 200 MG/1
200 CAPSULE ORAL 2 TIMES DAILY
Qty: 60 CAPSULE | Refills: 5 | Status: SHIPPED | OUTPATIENT
Start: 2025-08-11

## 2025-08-12 RX ORDER — HYDROCODONE BITARTRATE AND ACETAMINOPHEN 7.5; 325 MG/1; MG/1
.5-1 TABLET ORAL 2 TIMES DAILY PRN
Qty: 20 TABLET | Refills: 0 | OUTPATIENT
Start: 2025-08-12

## 2025-08-20 DIAGNOSIS — M54.16 LUMBAR RADICULOPATHY, ACUTE: ICD-10-CM

## 2025-08-20 DIAGNOSIS — M51.26 LUMBAR DISC HERNIATION: ICD-10-CM

## 2025-08-20 DIAGNOSIS — M62.838 MUSCLE SPASM: ICD-10-CM

## 2025-08-21 RX ORDER — METHOCARBAMOL 750 MG/1
750 TABLET, FILM COATED ORAL 4 TIMES DAILY
Qty: 120 TABLET | Refills: 0 | Status: SHIPPED | OUTPATIENT
Start: 2025-08-21

## 2025-08-22 RX ORDER — GABAPENTIN 300 MG/1
600 CAPSULE ORAL 3 TIMES DAILY
Qty: 180 CAPSULE | Refills: 0 | Status: SHIPPED | OUTPATIENT
Start: 2025-08-22

## 2025-08-27 ENCOUNTER — TELEPHONE (OUTPATIENT)
Dept: FAMILY MEDICINE CLINIC | Facility: CLINIC | Age: 40
End: 2025-08-27
Payer: COMMERCIAL

## (undated) DEVICE — DRSNG WND GZ PAD BORDERED 4X8IN STRL

## (undated) DEVICE — PK NEURO SPINE 40

## (undated) DEVICE — PIN DISTRACT TI 12MM STRL

## (undated) DEVICE — SOL ISO/ALC RUB 70PCT 4OZ

## (undated) DEVICE — SUT MNCRYL PLS ANTIB UD 4/0 PS2 18IN

## (undated) DEVICE — SPNG GZ WOVN 4X4IN 12PLY 10/BX STRL

## (undated) DEVICE — GLV SURG TRIUMPH PF LTX 7.5 STRL

## (undated) DEVICE — CONN TBG Y 5 IN 1 LF STRL

## (undated) DEVICE — ELECTRD BLD EZ CLN MOD XLNG 2.75IN

## (undated) DEVICE — GLV SURG SENSICARE PI PF LF 8.5 GRN STRL

## (undated) DEVICE — SPNG DISECTOR KTNER XRAY COTN 1/4X9/16IN PK/5

## (undated) DEVICE — ANTIBACTERIAL UNDYED BRAIDED (POLYGLACTIN 910), SYNTHETIC ABSORBABLE SUTURE: Brand: COATED VICRYL

## (undated) DEVICE — MARKR SKIN W/RULR AND LBL

## (undated) DEVICE — Device

## (undated) DEVICE — DISPOSABLE BIPOLAR CABLE 12FT. (3.6M): Brand: KIRWAN

## (undated) DEVICE — TOOL MR8-14CY65DX MR8 14CM CYL DX 6.5MM: Brand: MIDAS REX MR8

## (undated) DEVICE — 100% SILICONE FOLEY CATHETER, COUNCIL TIP, 5 CC, 2-WAY, 20 FR (6.7 MM): Brand: DOVER

## (undated) DEVICE — PENCL ES MEGADINE EZ/CLEAN BUTN W/HOLSTR 10FT

## (undated) DEVICE — LIMB HOLDER, WRIST/ANKLE: Brand: DEROYAL

## (undated) DEVICE — SMOKE EVACUATION TUBING WITH 7/8 IN TO 1/4 IN REDUCER: Brand: BUFFALO FILTER

## (undated) DEVICE — GLV SURG BIOGEL LTX PF 8

## (undated) DEVICE — SUT SILK 2/0 TIES 18IN A185H

## (undated) DEVICE — DRP C/ARM 41X74IN

## (undated) DEVICE — Device: Brand: PORTEX

## (undated) DEVICE — EPIDURAL TRAY: Brand: MEDLINE INDUSTRIES, INC.

## (undated) DEVICE — NDL HYPO PRECISIONGLIDE REG 25G 1 1/2

## (undated) DEVICE — ADHS SKIN SURG TISS VISC PREMIERPRO EXOFIN HI/VISC FAST/DRY

## (undated) DEVICE — NEEDLE, QUINCKE, 20GX3.5": Brand: MEDLINE

## (undated) DEVICE — 1 ML TUBERCULIN SYRINGE REGULAR TIP: Brand: MONOJECT

## (undated) DEVICE — COLR CERV VISTA TX 1SZ ADJ

## (undated) DEVICE — TUBING, SUCTION, 1/4" X 20', STRAIGHT: Brand: MEDLINE INDUSTRIES, INC.

## (undated) DEVICE — PATIENT RETURN ELECTRODE, SINGLE-USE, CONTACT QUALITY MONITORING, ADULT, WITH 9FT CORD, FOR PATIENTS WEIGING OVER 33LBS. (15KG): Brand: MEGADYNE

## (undated) DEVICE — DRP MICROSCOPE 4 BINOCULAR CV 54X150IN

## (undated) DEVICE — NDL SPINE 22G 31/2IN BLK